# Patient Record
Sex: FEMALE | Race: OTHER | Employment: FULL TIME | URBAN - METROPOLITAN AREA
[De-identification: names, ages, dates, MRNs, and addresses within clinical notes are randomized per-mention and may not be internally consistent; named-entity substitution may affect disease eponyms.]

---

## 2017-01-12 ENCOUNTER — TELEPHONE (OUTPATIENT)
Dept: FAMILY MEDICINE CLINIC | Facility: CLINIC | Age: 27
End: 2017-01-12

## 2017-01-12 DIAGNOSIS — Z11.1 SCREENING-PULMONARY TB: Primary | ICD-10-CM

## 2017-01-16 NOTE — TELEPHONE ENCOUNTER
ASHIA- Please call and schedule an appointment for patient for a nurse visit. Tb placement. See message below.

## 2017-01-17 ENCOUNTER — APPOINTMENT (OUTPATIENT)
Dept: LAB | Age: 27
End: 2017-01-17
Attending: FAMILY MEDICINE
Payer: MEDICAID

## 2017-01-17 DIAGNOSIS — Z11.1 SCREENING-PULMONARY TB: ICD-10-CM

## 2017-01-17 PROCEDURE — 86480 TB TEST CELL IMMUN MEASURE: CPT

## 2017-01-17 PROCEDURE — 36415 COLL VENOUS BLD VENIPUNCTURE: CPT

## 2017-01-20 LAB
M TB IFN-G CD4+ BCKGRND COR BLD-ACNC: 0.01 IU/ML
M TB IFN-G CD4+ T-CELLS BLD-ACNC: 0.08 IU/ML
M TB TUBERC IFN-G BLD QL: NEGATIVE
M TB TUBERC IGNF/MITOGEN IGNF CONTROL: >10 IU/ML

## 2017-01-21 RX ORDER — ETONOGESTREL/ETHINYL ESTRADIOL .12-.015MG
RING, VAGINAL VAGINAL
Qty: 3 EACH | Refills: 0 | Status: SHIPPED | OUTPATIENT
Start: 2017-01-21 | End: 2017-04-17

## 2017-01-21 NOTE — TELEPHONE ENCOUNTER
Refilled per written protocol.     Gynecology Medications  Protocol Criteria:  · Appointment scheduled in the past 12 months or the next 3 months  · Pap smear in the past 12 months  · Pap smear WNL manually verified  Recent Visits       Provider Department

## 2017-04-17 RX ORDER — ETONOGESTREL/ETHINYL ESTRADIOL .12-.015MG
RING, VAGINAL VAGINAL
Qty: 3 EACH | Refills: 0 | Status: SHIPPED | OUTPATIENT
Start: 2017-04-17 | End: 2017-07-17

## 2017-04-17 NOTE — TELEPHONE ENCOUNTER
Gynecology Medications  Protocol Criteria:  · Appointment scheduled in the past 12 months or the next 3 months  · Pap smear in the past 12 months  · Pap smear WNL manually verified  Recent Visits       Provider Department Primary Dx    5 months ago Jesus

## 2017-07-18 RX ORDER — ETONOGESTREL AND ETHINYL ESTRADIOL 11.7; 2.7 MG/1; MG/1
INSERT, EXTENDED RELEASE VAGINAL
Qty: 3 EACH | Refills: 0 | Status: SHIPPED | OUTPATIENT
Start: 2017-07-18 | End: 2017-10-06

## 2017-07-19 RX ORDER — ETONOGESTREL/ETHINYL ESTRADIOL .12-.015MG
RING, VAGINAL VAGINAL
Refills: 0 | OUTPATIENT
Start: 2017-07-19

## 2017-08-17 ENCOUNTER — OFFICE VISIT (OUTPATIENT)
Dept: FAMILY MEDICINE CLINIC | Facility: CLINIC | Age: 27
End: 2017-08-17

## 2017-08-17 ENCOUNTER — LAB ENCOUNTER (OUTPATIENT)
Dept: LAB | Age: 27
End: 2017-08-17
Attending: FAMILY MEDICINE
Payer: COMMERCIAL

## 2017-08-17 VITALS
SYSTOLIC BLOOD PRESSURE: 111 MMHG | BODY MASS INDEX: 28.56 KG/M2 | HEART RATE: 68 BPM | TEMPERATURE: 99 F | DIASTOLIC BLOOD PRESSURE: 69 MMHG | WEIGHT: 161.19 LBS | RESPIRATION RATE: 14 BRPM | HEIGHT: 63 IN

## 2017-08-17 DIAGNOSIS — D18.01 CHERRY ANGIOMA: ICD-10-CM

## 2017-08-17 DIAGNOSIS — M54.6 CHRONIC BILATERAL THORACIC BACK PAIN: ICD-10-CM

## 2017-08-17 DIAGNOSIS — G89.29 CHRONIC BILATERAL THORACIC BACK PAIN: ICD-10-CM

## 2017-08-17 DIAGNOSIS — Z20.01: ICD-10-CM

## 2017-08-17 DIAGNOSIS — Z20.01: Primary | ICD-10-CM

## 2017-08-17 PROCEDURE — 99212 OFFICE O/P EST SF 10 MIN: CPT | Performed by: FAMILY MEDICINE

## 2017-08-17 PROCEDURE — 87045 FECES CULTURE AEROBIC BACT: CPT

## 2017-08-17 PROCEDURE — 87427 SHIGA-LIKE TOXIN AG IA: CPT

## 2017-08-17 PROCEDURE — 99214 OFFICE O/P EST MOD 30 MIN: CPT | Performed by: FAMILY MEDICINE

## 2017-08-17 PROCEDURE — 87046 STOOL CULTR AEROBIC BACT EA: CPT

## 2017-08-17 RX ORDER — CYCLOBENZAPRINE HCL 10 MG
10 TABLET ORAL NIGHTLY
Qty: 30 TABLET | Refills: 0 | Status: SHIPPED | OUTPATIENT
Start: 2017-08-17 | End: 2019-06-27

## 2017-08-17 NOTE — PROGRESS NOTES
Patient ID: Monica Choudhury is a 32year old female. HPI  Patient presents with:  Back Pain  Growth    She states for 2 years now she has had a small red dot on the left side of her nose.   It is just there and is not bothersome but she wanted it evaluate Pregnancy 2008    Induced ; Outcome/detail:  6 week, Unknown sex   • Pregnancy     ; Comments:  APGAR  8 (1 min) 9 (5 min);  \"Donte\"       Past Surgical History:  , :        Current Outpatient Prescriptions:  Etonogestrel-Et Diagnoses and all orders for this visit:    Exposure to E. coli  -     STOOL CULTURE W/SHIGATOXIN; Future  Stool culture ordered  Cherry angioma  Reassurance  Chronic bilateral thoracic back pain  -     Cyclobenzaprine HCl 10 MG Oral Tab;  Take 1 tablet

## 2017-08-21 ENCOUNTER — TELEPHONE (OUTPATIENT)
Dept: FAMILY MEDICINE CLINIC | Facility: CLINIC | Age: 27
End: 2017-08-21

## 2017-09-11 RX ORDER — CLOBETASOL PROPIONATE 0.5 MG/G
CREAM TOPICAL
Qty: 45 G | Refills: 0 | OUTPATIENT
Start: 2017-09-11

## 2017-09-19 ENCOUNTER — HOSPITAL ENCOUNTER (OUTPATIENT)
Dept: GENERAL RADIOLOGY | Age: 27
Discharge: HOME | End: 2017-09-19
Attending: FAMILY MEDICINE
Payer: COMMERCIAL

## 2017-09-19 ENCOUNTER — OFFICE VISIT (OUTPATIENT)
Dept: FAMILY MEDICINE CLINIC | Facility: CLINIC | Age: 27
End: 2017-09-19

## 2017-09-19 VITALS
HEART RATE: 76 BPM | RESPIRATION RATE: 14 BRPM | HEIGHT: 63 IN | TEMPERATURE: 99 F | SYSTOLIC BLOOD PRESSURE: 116 MMHG | WEIGHT: 164 LBS | DIASTOLIC BLOOD PRESSURE: 77 MMHG | BODY MASS INDEX: 29.06 KG/M2

## 2017-09-19 DIAGNOSIS — Z00.00 ADULT GENERAL MEDICAL EXAM: Primary | ICD-10-CM

## 2017-09-19 DIAGNOSIS — G89.29 CHRONIC BILATERAL LOW BACK PAIN WITHOUT SCIATICA: ICD-10-CM

## 2017-09-19 DIAGNOSIS — M54.50 CHRONIC BILATERAL LOW BACK PAIN WITHOUT SCIATICA: ICD-10-CM

## 2017-09-19 DIAGNOSIS — R53.83 LETHARGY: ICD-10-CM

## 2017-09-19 DIAGNOSIS — R68.89 FORGETFULNESS: ICD-10-CM

## 2017-09-19 PROCEDURE — 72110 X-RAY EXAM L-2 SPINE 4/>VWS: CPT | Performed by: FAMILY MEDICINE

## 2017-09-19 PROCEDURE — 99395 PREV VISIT EST AGE 18-39: CPT | Performed by: FAMILY MEDICINE

## 2017-09-19 RX ORDER — NABUMETONE 750 MG/1
750 TABLET, FILM COATED ORAL 2 TIMES DAILY
Qty: 60 TABLET | Refills: 1 | Status: SHIPPED | OUTPATIENT
Start: 2017-09-19 | End: 2018-04-13

## 2017-09-19 NOTE — PROGRESS NOTES
Patient ID: Virginia Cox is a 32year old female. HPI  Patient presents with:  Routine Physical  She is here for physical.    She is , she does not smoke, she is in school. She states her back continues to hurt.   It is more in the left side Neurological: Negative for dizziness, tremors, seizures, syncope, facial asymmetry, speech difficulty, weakness, light-headedness and headaches. Hematological: Negative for adenopathy. Does not bruise/bleed easily.    Psychiatric/Behavioral: Negative fo reactive to light. Neck: Normal range of motion. Neck supple. No thyromegaly present. Cardiovascular: Normal rate, regular rhythm and no murmur heard. Pulmonary/Chest: Effort normal and breath sounds normal. No respiratory distress. Abdominal: Soft. times daily. Take with meals. (for pain/inflammation). Lethargy  -     VITAMIN D, 25-HYDROXY; Future  -     VITAMIN B12; Future  -     SED RATE, WESTERGREN (AUTOMATED);  Future  -     C-REACTIVE PROTEIN; Future  -     RUT, DIRECT SCREEN; Future  -     RH

## 2017-09-20 ENCOUNTER — LAB ENCOUNTER (OUTPATIENT)
Dept: LAB | Age: 27
End: 2017-09-20
Attending: FAMILY MEDICINE
Payer: COMMERCIAL

## 2017-09-20 DIAGNOSIS — G89.29 CHRONIC BILATERAL LOW BACK PAIN WITHOUT SCIATICA: ICD-10-CM

## 2017-09-20 DIAGNOSIS — Z00.00 ADULT GENERAL MEDICAL EXAM: ICD-10-CM

## 2017-09-20 DIAGNOSIS — M54.50 CHRONIC BILATERAL LOW BACK PAIN WITHOUT SCIATICA: ICD-10-CM

## 2017-09-20 DIAGNOSIS — R53.83 LETHARGY: ICD-10-CM

## 2017-09-20 LAB
ALBUMIN SERPL BCP-MCNC: 3.9 G/DL (ref 3.5–4.8)
ALBUMIN/GLOB SERPL: 1.1 {RATIO} (ref 1–2)
ALP SERPL-CCNC: 63 U/L (ref 32–100)
ALT SERPL-CCNC: 15 U/L (ref 14–54)
ANION GAP SERPL CALC-SCNC: 8 MMOL/L (ref 0–18)
AST SERPL-CCNC: 19 U/L (ref 15–41)
BASOPHILS # BLD: 0 K/UL (ref 0–0.2)
BASOPHILS NFR BLD: 0 %
BILIRUB SERPL-MCNC: 0.6 MG/DL (ref 0.3–1.2)
BUN SERPL-MCNC: 8 MG/DL (ref 8–20)
BUN/CREAT SERPL: 11.3 (ref 10–20)
CALCIUM SERPL-MCNC: 9.3 MG/DL (ref 8.5–10.5)
CHLORIDE SERPL-SCNC: 106 MMOL/L (ref 95–110)
CHOLEST SERPL-MCNC: 164 MG/DL (ref 110–200)
CO2 SERPL-SCNC: 24 MMOL/L (ref 22–32)
CREAT SERPL-MCNC: 0.71 MG/DL (ref 0.5–1.5)
CRP SERPL-MCNC: 0.6 MG/DL (ref 0–0.9)
EOSINOPHIL # BLD: 0.2 K/UL (ref 0–0.7)
EOSINOPHIL NFR BLD: 3 %
ERYTHROCYTE [DISTWIDTH] IN BLOOD BY AUTOMATED COUNT: 13 % (ref 11–15)
ERYTHROCYTE [SEDIMENTATION RATE] IN BLOOD: 6 MM/HR (ref 0–20)
GLOBULIN PLAS-MCNC: 3.7 G/DL (ref 2.5–3.7)
GLUCOSE SERPL-MCNC: 84 MG/DL (ref 70–99)
HCT VFR BLD AUTO: 41.9 % (ref 35–48)
HDLC SERPL-MCNC: 47 MG/DL
HGB BLD-MCNC: 14.2 G/DL (ref 12–16)
LDLC SERPL CALC-MCNC: 91 MG/DL (ref 0–99)
LYMPHOCYTES # BLD: 2.3 K/UL (ref 1–4)
LYMPHOCYTES NFR BLD: 33 %
MCH RBC QN AUTO: 28.5 PG (ref 27–32)
MCHC RBC AUTO-ENTMCNC: 33.8 G/DL (ref 32–37)
MCV RBC AUTO: 84.1 FL (ref 80–100)
MONOCYTES # BLD: 0.5 K/UL (ref 0–1)
MONOCYTES NFR BLD: 7 %
NEUTROPHILS # BLD AUTO: 3.9 K/UL (ref 1.8–7.7)
NEUTROPHILS NFR BLD: 57 %
NONHDLC SERPL-MCNC: 117 MG/DL
OSMOLALITY UR CALC.SUM OF ELEC: 284 MOSM/KG (ref 275–295)
PLATELET # BLD AUTO: 195 K/UL (ref 140–400)
PMV BLD AUTO: 9.8 FL (ref 7.4–10.3)
POTASSIUM SERPL-SCNC: 4 MMOL/L (ref 3.3–5.1)
PROT SERPL-MCNC: 7.6 G/DL (ref 5.9–8.4)
RBC # BLD AUTO: 4.98 M/UL (ref 3.7–5.4)
RHEUMATOID FACT SER QL: <5 IU/ML
SODIUM SERPL-SCNC: 138 MMOL/L (ref 136–144)
TRIGL SERPL-MCNC: 129 MG/DL (ref 1–149)
TSH SERPL-ACNC: 3.36 UIU/ML (ref 0.45–5.33)
VIT B12 SERPL-MCNC: 378 PG/ML (ref 181–914)
WBC # BLD AUTO: 6.9 K/UL (ref 4–11)

## 2017-09-20 PROCEDURE — 80053 COMPREHEN METABOLIC PANEL: CPT

## 2017-09-20 PROCEDURE — 80061 LIPID PANEL: CPT

## 2017-09-20 PROCEDURE — 82306 VITAMIN D 25 HYDROXY: CPT

## 2017-09-20 PROCEDURE — 36415 COLL VENOUS BLD VENIPUNCTURE: CPT

## 2017-09-20 PROCEDURE — 84443 ASSAY THYROID STIM HORMONE: CPT

## 2017-09-20 PROCEDURE — 86038 ANTINUCLEAR ANTIBODIES: CPT

## 2017-09-20 PROCEDURE — 82607 VITAMIN B-12: CPT

## 2017-09-20 PROCEDURE — 85652 RBC SED RATE AUTOMATED: CPT

## 2017-09-20 PROCEDURE — 85025 COMPLETE CBC W/AUTO DIFF WBC: CPT

## 2017-09-20 PROCEDURE — 86431 RHEUMATOID FACTOR QUANT: CPT

## 2017-09-20 PROCEDURE — 86140 C-REACTIVE PROTEIN: CPT

## 2017-09-21 LAB — ANA SER QL: NEGATIVE

## 2017-09-22 LAB — 25(OH)D3 SERPL-MCNC: 36.4 NG/ML

## 2017-10-07 RX ORDER — ETONOGESTREL/ETHINYL ESTRADIOL .12-.015MG
RING, VAGINAL VAGINAL
Qty: 1 EACH | Refills: 0 | Status: SHIPPED | OUTPATIENT
Start: 2017-10-07 | End: 2017-11-07

## 2017-10-07 NOTE — TELEPHONE ENCOUNTER
Signed Prescriptions Disp Refills    NUVARING 0.12-0.015 MG/24HR Vaginal Ring 1 each 0      Sig: INSERT 1 VAGINAL RING VAGINALLY AS DIRECTED        Authorizing Provider: Bethany Krause        Ordering User: Nadia Andersen           Refill approved per p

## 2017-11-07 RX ORDER — ETONOGESTREL/ETHINYL ESTRADIOL .12-.015MG
RING, VAGINAL VAGINAL
Qty: 3 EACH | Refills: 4 | Status: SHIPPED | OUTPATIENT
Start: 2017-11-07 | End: 2018-11-29

## 2017-11-07 NOTE — TELEPHONE ENCOUNTER
Unable to refill per protocol pap over 1 year ago.    Gynecology Medications  Protocol Criteria:  · Appointment scheduled in the past 12 months or the next 3 months  · Pap smear in the past 12 months  · Pap smear WNL manually verified  Recent Outpatient Vis

## 2018-03-29 ENCOUNTER — OFFICE VISIT (OUTPATIENT)
Dept: OBGYN CLINIC | Facility: CLINIC | Age: 28
End: 2018-03-29

## 2018-03-29 VITALS
DIASTOLIC BLOOD PRESSURE: 66 MMHG | BODY MASS INDEX: 30 KG/M2 | WEIGHT: 170 LBS | SYSTOLIC BLOOD PRESSURE: 105 MMHG | HEART RATE: 78 BPM

## 2018-03-29 DIAGNOSIS — N89.8 VAGINAL DISCHARGE: Primary | ICD-10-CM

## 2018-03-29 PROCEDURE — 99213 OFFICE O/P EST LOW 20 MIN: CPT | Performed by: OBSTETRICS & GYNECOLOGY

## 2018-03-29 NOTE — H&P
HPI:  The patient is a 27-year-old female who presents complaining of black discharge with her menses. The patient states she gets monthly menses. She uses NuvaRing for contraception.   She states that she bleeds for 5 days and the bleeding will either 0. 12-0.015 MG/24HR Vaginal Ring, INSERT 1 VAGINAL RING AS DIRECTED, Disp: 3 each, Rfl: 4  •  Nabumetone 750 MG Oral Tab, Take 1 tablet (750 mg total) by mouth 2 (two) times daily. Take with meals. (for pain/inflammation). , Disp: 60 tablet, Rfl: 1    ALLERG

## 2018-03-31 LAB
GENITAL VAGINOSIS SCREEN: NEGATIVE
TRICHOMONAS SCREEN: NEGATIVE

## 2018-04-04 ENCOUNTER — TELEPHONE (OUTPATIENT)
Dept: OBGYN CLINIC | Facility: CLINIC | Age: 28
End: 2018-04-04

## 2018-04-13 ENCOUNTER — OFFICE VISIT (OUTPATIENT)
Dept: OBGYN CLINIC | Facility: CLINIC | Age: 28
End: 2018-04-13

## 2018-04-13 VITALS
WEIGHT: 168 LBS | BODY MASS INDEX: 30.14 KG/M2 | HEIGHT: 62.5 IN | SYSTOLIC BLOOD PRESSURE: 103 MMHG | HEART RATE: 76 BPM | DIASTOLIC BLOOD PRESSURE: 66 MMHG

## 2018-04-13 DIAGNOSIS — Z01.419 ENCOUNTER FOR GYNECOLOGICAL EXAMINATION WITHOUT ABNORMAL FINDING: Primary | ICD-10-CM

## 2018-04-13 PROCEDURE — 99395 PREV VISIT EST AGE 18-39: CPT | Performed by: OBSTETRICS & GYNECOLOGY

## 2018-04-13 NOTE — H&P
HPI:  The patient is a 33 yo F here for WWE. NO complaints. LMP 3/24/18 with regular monthly  Cycles. Using nuvaring. +IC with .       LPS: 1/21/16-neg    Reviewed medical and surgical history below       OBSTETRICS HISTORY:  Obstetric History diarrhea or constipation  Genitourinary:  denies dysuria, incontinence, abnormal vaginal discharge, vaginal itching  Musculoskeletal:  denies back pain. Skin/Breast:  Denies any breast pain, lumps, or discharge.    Neurological:  denies headaches, extremit

## 2018-11-08 ENCOUNTER — OFFICE VISIT (OUTPATIENT)
Dept: FAMILY MEDICINE CLINIC | Facility: CLINIC | Age: 28
End: 2018-11-08
Payer: MEDICAID

## 2018-11-08 VITALS
HEIGHT: 63 IN | DIASTOLIC BLOOD PRESSURE: 71 MMHG | BODY MASS INDEX: 26.26 KG/M2 | TEMPERATURE: 99 F | HEART RATE: 65 BPM | SYSTOLIC BLOOD PRESSURE: 108 MMHG | RESPIRATION RATE: 12 BRPM | WEIGHT: 148.19 LBS

## 2018-11-08 DIAGNOSIS — Z00.00 ADULT GENERAL MEDICAL EXAM: Primary | ICD-10-CM

## 2018-11-08 DIAGNOSIS — L98.7 EXCESS SKIN OF ABDOMINAL WALL: ICD-10-CM

## 2018-11-08 DIAGNOSIS — Z01.818 PREOP EXAMINATION: ICD-10-CM

## 2018-11-08 DIAGNOSIS — N89.8 ITCHING IN THE VAGINAL AREA: ICD-10-CM

## 2018-11-08 DIAGNOSIS — Z23 NEED FOR VACCINATION: ICD-10-CM

## 2018-11-08 PROCEDURE — 99212 OFFICE O/P EST SF 10 MIN: CPT | Performed by: FAMILY MEDICINE

## 2018-11-08 PROCEDURE — 90715 TDAP VACCINE 7 YRS/> IM: CPT | Performed by: FAMILY MEDICINE

## 2018-11-08 PROCEDURE — 99395 PREV VISIT EST AGE 18-39: CPT | Performed by: FAMILY MEDICINE

## 2018-11-08 PROCEDURE — 90471 IMMUNIZATION ADMIN: CPT | Performed by: FAMILY MEDICINE

## 2018-11-08 NOTE — PATIENT INSTRUCTIONS
Try some 1% over-the-counter hydrocortisone cream and just apply a small amount to the area that itches outside of the vaginal area but make sure to see Dr. Paulie Perry or 1 of his partners for gynecology.

## 2018-11-09 ENCOUNTER — APPOINTMENT (OUTPATIENT)
Dept: LAB | Age: 28
End: 2018-11-09
Attending: FAMILY MEDICINE
Payer: MEDICAID

## 2018-11-09 ENCOUNTER — LAB ENCOUNTER (OUTPATIENT)
Dept: LAB | Age: 28
End: 2018-11-09
Attending: FAMILY MEDICINE
Payer: MEDICAID

## 2018-11-09 DIAGNOSIS — Z01.818 PREOP EXAMINATION: ICD-10-CM

## 2018-11-09 DIAGNOSIS — L98.7 EXCESS SKIN OF ABDOMINAL WALL: ICD-10-CM

## 2018-11-09 DIAGNOSIS — Z00.00 ADULT GENERAL MEDICAL EXAM: ICD-10-CM

## 2018-11-09 PROCEDURE — 80053 COMPREHEN METABOLIC PANEL: CPT

## 2018-11-09 PROCEDURE — 93005 ELECTROCARDIOGRAM TRACING: CPT

## 2018-11-09 PROCEDURE — 81241 F5 GENE: CPT

## 2018-11-09 PROCEDURE — 36415 COLL VENOUS BLD VENIPUNCTURE: CPT

## 2018-11-09 PROCEDURE — 85730 THROMBOPLASTIN TIME PARTIAL: CPT

## 2018-11-09 PROCEDURE — 84443 ASSAY THYROID STIM HORMONE: CPT

## 2018-11-09 PROCEDURE — 85025 COMPLETE CBC W/AUTO DIFF WBC: CPT

## 2018-11-09 PROCEDURE — 85610 PROTHROMBIN TIME: CPT

## 2018-11-09 PROCEDURE — 93010 ELECTROCARDIOGRAM REPORT: CPT | Performed by: FAMILY MEDICINE

## 2018-11-09 PROCEDURE — 80061 LIPID PANEL: CPT

## 2018-11-13 ENCOUNTER — TELEPHONE (OUTPATIENT)
Dept: FAMILY MEDICINE CLINIC | Facility: CLINIC | Age: 28
End: 2018-11-13

## 2018-11-13 NOTE — TELEPHONE ENCOUNTER
I have made multiple attempts to contact her surgeons office to ask for the fax# where her preop paper should go, with no luck.  Left message for patient to contact her surgeon to get a fax# for us and call us back with that information

## 2018-11-21 ENCOUNTER — OFFICE VISIT (OUTPATIENT)
Dept: OBGYN CLINIC | Facility: CLINIC | Age: 28
End: 2018-11-21
Payer: MEDICAID

## 2018-11-21 VITALS
WEIGHT: 148 LBS | BODY MASS INDEX: 26 KG/M2 | SYSTOLIC BLOOD PRESSURE: 112 MMHG | HEART RATE: 67 BPM | DIASTOLIC BLOOD PRESSURE: 71 MMHG

## 2018-11-21 DIAGNOSIS — N89.8 VAGINAL ITCHING: Primary | ICD-10-CM

## 2018-11-21 PROCEDURE — 99213 OFFICE O/P EST LOW 20 MIN: CPT | Performed by: OBSTETRICS & GYNECOLOGY

## 2018-11-21 RX ORDER — LIDOCAINE HYDROCHLORIDE 20 MG/ML
SOLUTION ORAL; TOPICAL
Refills: 0 | COMMUNITY
Start: 2018-11-14 | End: 2019-06-14

## 2018-11-21 NOTE — H&P
HPI:  28 yo F c/o 2 weeks of vaginal itch, dryness, and cheezy DC. No abx recently. No douching or changes soaps/detergents. +IC with  of 10 years.   Has nuvaring    Reviewed medical and surgical history below       OBSTETRICS HISTORY:  Obstetric Back Care: Not Asked        Exercise: Not Asked        Bike Helmet: Not Asked        Seat Belt: Not Asked        Self-Exams: Not Asked    Social History Narrative      Not on file      FAMILY HISTORY:  Family History   Problem Relation Age of Onset   • Will call with results

## 2018-11-23 ENCOUNTER — TELEPHONE (OUTPATIENT)
Dept: OBGYN CLINIC | Facility: CLINIC | Age: 28
End: 2018-11-23

## 2018-11-23 RX ORDER — FLUCONAZOLE 150 MG/1
150 TABLET ORAL ONCE
Qty: 2 TABLET | Refills: 0 | Status: SHIPPED | OUTPATIENT
Start: 2018-11-23 | End: 2018-11-23

## 2018-11-23 NOTE — TELEPHONE ENCOUNTER
Pt informed of MAZs recs below and verbalized understanding.  Diflucan x 1 tab now and repeat dose 48-72 hours later sent to pharmacy

## 2018-11-29 RX ORDER — ETONOGESTREL/ETHINYL ESTRADIOL .12-.015MG
RING, VAGINAL VAGINAL
Qty: 3 EACH | Refills: 1 | Status: SHIPPED | OUTPATIENT
Start: 2018-11-29 | End: 2019-05-20

## 2018-12-11 ENCOUNTER — HOSPITAL ENCOUNTER (OUTPATIENT)
Dept: GENERAL RADIOLOGY | Age: 28
Discharge: HOME | End: 2018-12-11
Attending: FAMILY MEDICINE
Payer: MEDICAID

## 2018-12-11 DIAGNOSIS — Z11.1 SCREENING-PULMONARY TB: ICD-10-CM

## 2018-12-11 PROCEDURE — 71046 X-RAY EXAM CHEST 2 VIEWS: CPT | Performed by: FAMILY MEDICINE

## 2018-12-14 ENCOUNTER — TELEPHONE (OUTPATIENT)
Dept: FAMILY MEDICINE CLINIC | Facility: CLINIC | Age: 28
End: 2018-12-14

## 2019-05-20 RX ORDER — ETONOGESTREL/ETHINYL ESTRADIOL .12-.015MG
RING, VAGINAL VAGINAL
Qty: 3 EACH | Refills: 1 | Status: SHIPPED | OUTPATIENT
Start: 2019-05-20 | End: 2019-11-16

## 2019-05-20 NOTE — TELEPHONE ENCOUNTER
Patient failed protocol. Script pended. Please advise.     Overdue - Pap Smear,3 Years   Overdue since 1/21/2019    (Every 3 Years)   01/21/2016  THINPREP PAP SMEAR         Gynecology Medications  Protocol Criteria:  · Appointment scheduled in the past 12 m

## 2019-06-14 PROBLEM — F41.9 ANXIETY: Status: ACTIVE | Noted: 2019-06-14

## 2019-06-14 PROBLEM — F32.A DEPRESSION: Status: ACTIVE | Noted: 2019-06-14

## 2019-06-14 NOTE — PROGRESS NOTES
Patient ID: Virginia Cox is a 29year old female. HPI  Patient presents with: Anxiety  Stress     Works at a school as a , is , and does not smoke. Anxiety started a year ago. Pt also reports she occasionally has depression. History:   Diagnosis Date   • Pregnancy     24 hr labor ; Outcome/detail:  40 1/2 week 7 lb(s) 6 oz Female; Comments:  \"Jovita\"; [facility]); No complications   • Pregnancy     Induced ;  Outcome/detail:  6 week, Unknow be beneficial as patient states she just told everything inside although she does talk to her  about how worried she is. Depression, unspecified depression type  -     escitalopram 10 MG Oral Tab; Take 1 tablet (10 mg total) by mouth daily.  For fozia

## 2019-06-27 ENCOUNTER — OFFICE VISIT (OUTPATIENT)
Dept: FAMILY MEDICINE CLINIC | Facility: CLINIC | Age: 29
End: 2019-06-27
Payer: MEDICAID

## 2019-06-27 VITALS
RESPIRATION RATE: 12 BRPM | DIASTOLIC BLOOD PRESSURE: 72 MMHG | WEIGHT: 145.38 LBS | HEIGHT: 63 IN | SYSTOLIC BLOOD PRESSURE: 106 MMHG | BODY MASS INDEX: 25.76 KG/M2 | TEMPERATURE: 99 F | HEART RATE: 67 BPM

## 2019-06-27 DIAGNOSIS — L56.8 PHOTODERMATITIS DUE TO SUN: ICD-10-CM

## 2019-06-27 DIAGNOSIS — M53.3 SI (SACROILIAC) PAIN: ICD-10-CM

## 2019-06-27 DIAGNOSIS — M54.50 ACUTE LEFT-SIDED LOW BACK PAIN WITHOUT SCIATICA: Primary | ICD-10-CM

## 2019-06-27 PROCEDURE — 99214 OFFICE O/P EST MOD 30 MIN: CPT | Performed by: FAMILY MEDICINE

## 2019-06-27 PROCEDURE — 99212 OFFICE O/P EST SF 10 MIN: CPT | Performed by: FAMILY MEDICINE

## 2019-06-27 RX ORDER — CLOBETASOL PROPIONATE 0.5 MG/G
1 CREAM TOPICAL 2 TIMES DAILY
Qty: 45 G | Refills: 0 | Status: SHIPPED | OUTPATIENT
Start: 2019-06-27 | End: 2020-02-13

## 2019-06-27 RX ORDER — CYCLOBENZAPRINE HCL 10 MG
10 TABLET ORAL NIGHTLY
Qty: 30 TABLET | Refills: 0 | Status: SHIPPED | OUTPATIENT
Start: 2019-06-27 | End: 2019-07-17

## 2019-06-27 RX ORDER — NABUMETONE 750 MG/1
750 TABLET, FILM COATED ORAL 2 TIMES DAILY
Qty: 60 TABLET | Refills: 1 | Status: SHIPPED | OUTPATIENT
Start: 2019-06-27 | End: 2019-08-22

## 2019-06-27 NOTE — PATIENT INSTRUCTIONS
Take the Relafen 750 mg twice daily with food for the next 2 to 3 weeks. You could take the cyclobenzaprine at nighttime only as that is a muscle relaxant and can make you a bit tired.   For the next 3 days go ahead and ice your low back 3 or 4 times daily

## 2019-06-27 NOTE — PROGRESS NOTES
Patient ID: Leticia Juárez is a 29year old female. HPI  Patient presents with:  Back Pain: lower back x 1wk     Back pain started a week and a half ago. No injury or trauma. Localized to left lower back. Reports that the pain is deep.  Also has soreness Cardiovascular: Negative for chest pain. Gastrointestinal: Negative for abdominal pain. Musculoskeletal: Positive for back pain. Skin: Negative for color change. Neurological: Negative for speech difficulty.    Psychiatric/Behavioral: The patient mood and affect. Vitals reviewed. ASSESSMENT/PLAN:     Diagnoses and all orders for this visit:    Acute left-sided low back pain without sciatica  -     Nabumetone 750 MG Oral Tab; Take 1 tablet (750 mg total) by mouth 2 (two) times daily.  Arcelia Garcias or 4 times daily on the area of pain. Go ahead and do your stretches as well. If you are still having pain after 2 or 3 weeks then please see me but otherwise no need.         Robin Arias  6/27/2019      By signing my name below, I, louis Bellamy

## 2019-08-22 DIAGNOSIS — M53.3 SI (SACROILIAC) PAIN: ICD-10-CM

## 2019-08-22 DIAGNOSIS — M54.50 ACUTE LEFT-SIDED LOW BACK PAIN WITHOUT SCIATICA: ICD-10-CM

## 2019-08-22 RX ORDER — NABUMETONE 750 MG/1
TABLET, FILM COATED ORAL
Qty: 180 TABLET | Refills: 1 | Status: SHIPPED | OUTPATIENT
Start: 2019-08-22 | End: 2020-02-13

## 2019-08-22 NOTE — TELEPHONE ENCOUNTER
Dr Lee=pended for approval, with HIGH ALERT WARNING between Escitalopram and Nabumetone. Refill passed per [facility] protocol.     Refill Protocol Appointment Criteria  · Appointment scheduled in the past 6 months or in the next 3 months  Recent O

## 2019-09-09 DIAGNOSIS — F32.A DEPRESSION, UNSPECIFIED DEPRESSION TYPE: ICD-10-CM

## 2019-09-09 DIAGNOSIS — F41.9 ANXIETY: ICD-10-CM

## 2019-09-09 RX ORDER — ESCITALOPRAM OXALATE 10 MG/1
TABLET ORAL
Qty: 90 TABLET | Refills: 0 | Status: SHIPPED | OUTPATIENT
Start: 2019-09-09 | End: 2020-02-13

## 2019-09-09 NOTE — TELEPHONE ENCOUNTER
Refilled times 1 but needs appointment before the next prescription will be filled. Please call patient and set up an office visit as overdue. I will see her in November for a physical exam.  I gave her 3 months worth of medication.

## 2019-09-11 ENCOUNTER — TELEPHONE (OUTPATIENT)
Dept: OBGYN CLINIC | Facility: CLINIC | Age: 29
End: 2019-09-11

## 2019-09-11 NOTE — TELEPHONE ENCOUNTER
Confirms +HPT and LMP 8/12/19. 4w2d confirms regular cycles every 28 days. Advised of rotating male and female practice. Advised first appt is OBN and to come in a few minutes early for uhcg. States taking PNV with DHA and folic acid.  Informed no appts bruce

## 2019-09-25 NOTE — TELEPHONE ENCOUNTER
Patient was inform medication was approved and  will like to see her in November for her physical. Patient said she will call back tomorrow to make appointment.

## 2019-10-07 ENCOUNTER — LAB ENCOUNTER (OUTPATIENT)
Dept: LAB | Facility: HOSPITAL | Age: 29
End: 2019-10-07
Attending: OBSTETRICS & GYNECOLOGY
Payer: MEDICAID

## 2019-10-07 ENCOUNTER — NURSE ONLY (OUTPATIENT)
Dept: OBGYN CLINIC | Facility: CLINIC | Age: 29
End: 2019-10-07

## 2019-10-07 DIAGNOSIS — Z34.91 ENCOUNTER FOR SUPERVISION OF NORMAL PREGNANCY IN FIRST TRIMESTER, UNSPECIFIED GRAVIDITY: ICD-10-CM

## 2019-10-07 PROCEDURE — 86900 BLOOD TYPING SEROLOGIC ABO: CPT

## 2019-10-07 PROCEDURE — 86803 HEPATITIS C AB TEST: CPT

## 2019-10-07 PROCEDURE — 87340 HEPATITIS B SURFACE AG IA: CPT

## 2019-10-07 PROCEDURE — 85025 COMPLETE CBC W/AUTO DIFF WBC: CPT

## 2019-10-07 PROCEDURE — 86850 RBC ANTIBODY SCREEN: CPT

## 2019-10-07 PROCEDURE — 87086 URINE CULTURE/COLONY COUNT: CPT

## 2019-10-07 PROCEDURE — 87389 HIV-1 AG W/HIV-1&-2 AB AG IA: CPT

## 2019-10-07 PROCEDURE — 86780 TREPONEMA PALLIDUM: CPT

## 2019-10-07 PROCEDURE — 86762 RUBELLA ANTIBODY: CPT

## 2019-10-07 PROCEDURE — 36415 COLL VENOUS BLD VENIPUNCTURE: CPT

## 2019-10-07 PROCEDURE — 86901 BLOOD TYPING SEROLOGIC RH(D): CPT

## 2019-10-16 ENCOUNTER — INITIAL PRENATAL (OUTPATIENT)
Dept: OBGYN CLINIC | Facility: CLINIC | Age: 29
End: 2019-10-16
Payer: MEDICAID

## 2019-10-16 ENCOUNTER — TELEPHONE (OUTPATIENT)
Dept: OBGYN CLINIC | Facility: CLINIC | Age: 29
End: 2019-10-16

## 2019-10-16 VITALS
SYSTOLIC BLOOD PRESSURE: 103 MMHG | WEIGHT: 157 LBS | HEART RATE: 82 BPM | BODY MASS INDEX: 28 KG/M2 | DIASTOLIC BLOOD PRESSURE: 64 MMHG

## 2019-10-16 DIAGNOSIS — Z34.81 ENCOUNTER FOR SUPERVISION OF OTHER NORMAL PREGNANCY IN FIRST TRIMESTER: Primary | ICD-10-CM

## 2019-10-16 DIAGNOSIS — Z12.4 CERVICAL CANCER SCREENING: ICD-10-CM

## 2019-10-16 DIAGNOSIS — Z78.9 RECENT FOREIGN TRAVEL: Primary | ICD-10-CM

## 2019-10-16 DIAGNOSIS — Z11.3 SCREEN FOR STD (SEXUALLY TRANSMITTED DISEASE): ICD-10-CM

## 2019-10-16 PROBLEM — O26.899 RH NEGATIVE STATUS DURING PREGNANCY: Status: ACTIVE | Noted: 2019-10-16

## 2019-10-16 PROBLEM — Z20.821 ZIKA VIRUS EXPOSURE: Status: ACTIVE | Noted: 2019-10-16

## 2019-10-16 PROBLEM — O26.899 RH NEGATIVE STATUS DURING PREGNANCY (HCC): Status: ACTIVE | Noted: 2019-10-16

## 2019-10-16 PROBLEM — Z67.91 RH NEGATIVE STATUS DURING PREGNANCY: Status: ACTIVE | Noted: 2019-10-16

## 2019-10-16 PROBLEM — Z67.91 RH NEGATIVE STATUS DURING PREGNANCY (HCC): Status: ACTIVE | Noted: 2019-10-16

## 2019-10-16 PROCEDURE — 81002 URINALYSIS NONAUTO W/O SCOPE: CPT | Performed by: OBSTETRICS & GYNECOLOGY

## 2019-10-16 PROCEDURE — 0500F INITIAL PRENATAL CARE VISIT: CPT | Performed by: OBSTETRICS & GYNECOLOGY

## 2019-10-16 NOTE — PROGRESS NOTES
Pap, GC/CT/Trich today. Pt with light brown discharge over 1.5 weeks ago. No cramping or bleeding. Declines FTS. Pt traveled (without FOB) to [address] [place] [address] in July. Reviewed level 2 US for Zika. BSUS with S=D and +FHTs.  Regular menses and will keep due date  RTC

## 2019-10-17 ENCOUNTER — PATIENT MESSAGE (OUTPATIENT)
Dept: OBGYN CLINIC | Facility: CLINIC | Age: 29
End: 2019-10-17

## 2019-10-17 NOTE — TELEPHONE ENCOUNTER
ADVISED LEVEL II ULTRASOUND IS RECOMMENDED DUE TO TRAVEL TO [place] AND POSSIBLE ZIKA EXPOSURE. PT REASSURED, SHE HAD FORGOTTEN THAT WAS TALKED ABOUT.

## 2019-10-18 ENCOUNTER — TELEPHONE (OUTPATIENT)
Dept: OBGYN CLINIC | Facility: CLINIC | Age: 29
End: 2019-10-18

## 2019-10-18 NOTE — TELEPHONE ENCOUNTER
Pt is 9 weeks pregnant and states she has been cramping all day  Also states she saw a bit of blood when wiping pls adv

## 2019-10-18 NOTE — TELEPHONE ENCOUNTER
Informed pt that GISELLA stated she needs Rhogam w/o add ab screen. Informed pt no IC x 1 week. Informed we will see her in office on Monday if spotting continues. Pt made an appt for tomorrow for Rhogam on nurse schedule.     Informed pt if her bleeding incr

## 2019-10-18 NOTE — TELEPHONE ENCOUNTER
9w4d.  Pt states that she has cramping she rates a 6 out of 10 that comes and goes. Pt had IC yesterday. Pt states she started with red spotting today when she wipes. Pt is O negative.

## 2019-10-18 NOTE — TELEPHONE ENCOUNTER
Needs Rhogam w/o additional Ab screen. No IC x 1 week. See her in office Monday if spotting continues.

## 2019-10-19 ENCOUNTER — NURSE ONLY (OUTPATIENT)
Dept: OBGYN CLINIC | Facility: CLINIC | Age: 29
End: 2019-10-19
Payer: MEDICAID

## 2019-10-19 VITALS
DIASTOLIC BLOOD PRESSURE: 71 MMHG | BODY MASS INDEX: 28 KG/M2 | SYSTOLIC BLOOD PRESSURE: 105 MMHG | HEART RATE: 80 BPM | WEIGHT: 157 LBS

## 2019-10-19 DIAGNOSIS — Z29.13 ENCOUNTER FOR PROPHYLACTIC ADMINISTRATION OF RHOGAM: Primary | ICD-10-CM

## 2019-10-19 PROCEDURE — 96372 THER/PROPH/DIAG INJ SC/IM: CPT | Performed by: OBSTETRICS & GYNECOLOGY

## 2019-10-19 NOTE — PROGRESS NOTES
PT CALLED YESTERDAY WITH SPOTTING THAT IS RESOLVED TODAY. RHOGAM NON-ROUTINE [address] CONSENT SIGNED. RHOGAM INJECTION GIVEN. PT TOLERATED INJECTION WITHOUT INCIDENT. ENCOURAGED TO CALL BACK WITH ANY QUESTIONS OR CONCERNS.

## 2019-10-19 NOTE — PATIENT INSTRUCTIONS
If You Are Rh Negative – Non Routine Administration  If you’re Rh negative, ask your health care provider about getting treated with RhoGam or Rhophylac. Even if you miscarry or don’t deliver the baby, you will still need treatment.  The health of any bab o Blood test to check for blood type and Rh factor at an [address] [facility] ([facility] [place] or [facility])  o RhoGam or Rhophylac injection same day as directed by your provider    Location, date and time:  [address]  10-19

## 2019-10-29 ENCOUNTER — TELEPHONE (OUTPATIENT)
Dept: OBGYN CLINIC | Facility: CLINIC | Age: 29
End: 2019-10-29

## 2019-10-29 NOTE — TELEPHONE ENCOUNTER
Informed pt of results and KCB rec below. Pt requesting appt at [address] [place] [address] location. Assisted pt with scheduling appt with KCB at [address] [place] [address] on 11/6/19 at 8:40am. Pt aware if Polo Stewart wants appt moved we will inform pt. Pt verbalized understanding.      KCB-okay to us

## 2019-10-29 NOTE — TELEPHONE ENCOUNTER
----- Message from Danae Peña MD sent at 10/29/2019  3:50 PM CDT -----  Patient needs colpo for LSIL pap smear. This needs to be done in pregnancy but likely will not take biopsies. Please have her schedule.

## 2019-11-06 ENCOUNTER — OFFICE VISIT (OUTPATIENT)
Dept: OBGYN CLINIC | Facility: CLINIC | Age: 29
End: 2019-11-06
Payer: MEDICAID

## 2019-11-06 VITALS — DIASTOLIC BLOOD PRESSURE: 66 MMHG | SYSTOLIC BLOOD PRESSURE: 111 MMHG | HEART RATE: 76 BPM

## 2019-11-06 DIAGNOSIS — R87.612 PAPANICOLAOU SMEAR OF CERVIX WITH LOW GRADE SQUAMOUS INTRAEPITHELIAL LESION (LGSIL): Primary | ICD-10-CM

## 2019-11-06 PROCEDURE — 57452 EXAM OF CERVIX W/SCOPE: CPT | Performed by: OBSTETRICS & GYNECOLOGY

## 2019-11-06 NOTE — PROCEDURES
Colposcopy      Consent signed. Procedure discussed with patient in detail including indication, risk, benefits, alternatives and complications. Indication: LSIL    Procedure: The patient was placed in the dorsal lithotomy position.   [place] speculum

## 2019-11-16 ENCOUNTER — ROUTINE PRENATAL (OUTPATIENT)
Dept: OBGYN CLINIC | Facility: CLINIC | Age: 29
End: 2019-11-16
Payer: MEDICAID

## 2019-11-16 VITALS
HEART RATE: 78 BPM | WEIGHT: 159 LBS | DIASTOLIC BLOOD PRESSURE: 69 MMHG | BODY MASS INDEX: 29 KG/M2 | SYSTOLIC BLOOD PRESSURE: 111 MMHG

## 2019-11-16 DIAGNOSIS — Z34.91 ENCOUNTER FOR SUPERVISION OF NORMAL PREGNANCY IN FIRST TRIMESTER, UNSPECIFIED GRAVIDITY: Primary | ICD-10-CM

## 2019-11-16 PROCEDURE — 0502F SUBSEQUENT PRENATAL CARE: CPT | Performed by: OBSTETRICS & GYNECOLOGY

## 2019-11-16 PROCEDURE — 81002 URINALYSIS NONAUTO W/O SCOPE: CPT | Performed by: OBSTETRICS & GYNECOLOGY

## 2019-12-07 ENCOUNTER — TELEPHONE (OUTPATIENT)
Dept: OBGYN CLINIC | Facility: CLINIC | Age: 29
End: 2019-12-07

## 2019-12-07 NOTE — TELEPHONE ENCOUNTER
Informed pt that [address] stated she should watch it and see if it happens again. Pt stated understanding.

## 2019-12-07 NOTE — TELEPHONE ENCOUNTER
16w5d    Pt states she woke up from a dull pain 3:00 am today. She rates it a 6/10 and it went way after 30 minutes. Did not take any medication for the pain. It was on the right side under her breast, 3\" down. Pt states that she went back to bed.     P

## 2019-12-16 ENCOUNTER — ROUTINE PRENATAL (OUTPATIENT)
Dept: OBGYN CLINIC | Facility: CLINIC | Age: 29
End: 2019-12-16
Payer: MEDICAID

## 2019-12-16 VITALS
WEIGHT: 166.19 LBS | BODY MASS INDEX: 30 KG/M2 | HEART RATE: 85 BPM | SYSTOLIC BLOOD PRESSURE: 116 MMHG | DIASTOLIC BLOOD PRESSURE: 75 MMHG

## 2019-12-16 DIAGNOSIS — Z34.91 ENCOUNTER FOR SUPERVISION OF NORMAL PREGNANCY IN FIRST TRIMESTER, UNSPECIFIED GRAVIDITY: Primary | ICD-10-CM

## 2019-12-16 PROCEDURE — 0502F SUBSEQUENT PRENATAL CARE: CPT | Performed by: OBSTETRICS & GYNECOLOGY

## 2019-12-16 PROCEDURE — 90471 IMMUNIZATION ADMIN: CPT | Performed by: OBSTETRICS & GYNECOLOGY

## 2019-12-16 PROCEDURE — 81002 URINALYSIS NONAUTO W/O SCOPE: CPT | Performed by: OBSTETRICS & GYNECOLOGY

## 2019-12-16 PROCEDURE — 90686 IIV4 VACC NO PRSV 0.5 ML IM: CPT | Performed by: OBSTETRICS & GYNECOLOGY

## 2019-12-29 NOTE — PROGRESS NOTES
Outpatient Maternal-Fetal Medicine Consultation    Dear Dr. Diallo Current    Thank you for requesting ultrasound evaluation and maternal fetal medicine consultation on your patient Monica Choudhury.   As you are aware she is a 34year old female  with a ambrosio not taking: Reported on 12/16/2019), Disp: 90 tablet, Rfl: 0  •  NABUMETONE 750 MG Oral Tab, TAKE 1 TABLET(750 MG) BY MOUTH TWICE DAILY WITH MEALS FOR PAIN OR INFLAMMATION (Patient not taking: Reported on 12/16/2019), Disp: 180 tablet, Rfl: 1  •  triamcino pregnant women with ongoing exposure regardless of symptoms in accordance with CDC recommendations.  Information and recommendations regarding Zika virus screening and testing are evolving, and obstetrician–gynecologists and other health care providers teodorau from symptom onset or exposure to attempt pregnancy. This includes women with diagnosed Zika virus infection.   · To prevent possible infection during time periods near or around fertilization, if a male partner has possible Zika virus exposure, regardless use of ultrasonography to evaluate for fetal abnormalities consistent with congenital Zika virus syndrome is recommended.   · Obstetrician–gynecologists and other obstetric care providers should have a system to ensure relevant information regarding a woman

## 2019-12-30 ENCOUNTER — HOSPITAL ENCOUNTER (OUTPATIENT)
Dept: PERINATAL CARE | Facility: HOSPITAL | Age: 29
Discharge: HOME | End: 2019-12-30
Attending: OBSTETRICS & GYNECOLOGY
Payer: MEDICAID

## 2019-12-30 VITALS
HEART RATE: 81 BPM | DIASTOLIC BLOOD PRESSURE: 64 MMHG | HEIGHT: 63 IN | BODY MASS INDEX: 29.41 KG/M2 | WEIGHT: 166 LBS | SYSTOLIC BLOOD PRESSURE: 114 MMHG

## 2019-12-30 DIAGNOSIS — Z20.821 ZIKA VIRUS EXPOSURE AFFECTING PREGNANCY: ICD-10-CM

## 2019-12-30 DIAGNOSIS — O99.891 ZIKA VIRUS EXPOSURE AFFECTING PREGNANCY: Primary | ICD-10-CM

## 2019-12-30 DIAGNOSIS — Z20.821 ZIKA VIRUS EXPOSURE AFFECTING PREGNANCY: Primary | ICD-10-CM

## 2019-12-30 DIAGNOSIS — O99.891 ZIKA VIRUS EXPOSURE AFFECTING PREGNANCY: ICD-10-CM

## 2019-12-30 PROCEDURE — 76811 OB US DETAILED SNGL FETUS: CPT | Performed by: OBSTETRICS & GYNECOLOGY

## 2019-12-30 PROCEDURE — 99203 OFFICE O/P NEW LOW 30 MIN: CPT | Performed by: OBSTETRICS & GYNECOLOGY

## 2020-01-13 ENCOUNTER — ROUTINE PRENATAL (OUTPATIENT)
Dept: OBGYN CLINIC | Facility: CLINIC | Age: 30
End: 2020-01-13
Payer: MEDICAID

## 2020-01-13 VITALS
WEIGHT: 166 LBS | HEART RATE: 85 BPM | DIASTOLIC BLOOD PRESSURE: 74 MMHG | SYSTOLIC BLOOD PRESSURE: 115 MMHG | BODY MASS INDEX: 29 KG/M2

## 2020-01-13 DIAGNOSIS — Z34.82 ENCOUNTER FOR SUPERVISION OF OTHER NORMAL PREGNANCY IN SECOND TRIMESTER: Primary | ICD-10-CM

## 2020-01-13 LAB
APPEARANCE: CLEAR
MULTISTIX LOT#: NORMAL NUMERIC
URINE-COLOR: YELLOW

## 2020-01-13 PROCEDURE — 0502F SUBSEQUENT PRENATAL CARE: CPT | Performed by: OBSTETRICS & GYNECOLOGY

## 2020-01-13 PROCEDURE — 81002 URINALYSIS NONAUTO W/O SCOPE: CPT | Performed by: OBSTETRICS & GYNECOLOGY

## 2020-01-20 ENCOUNTER — TELEPHONE (OUTPATIENT)
Dept: PERINATAL CARE | Facility: HOSPITAL | Age: 30
End: 2020-01-20

## 2020-01-21 ENCOUNTER — TELEPHONE (OUTPATIENT)
Dept: OBGYN CLINIC | Facility: CLINIC | Age: 30
End: 2020-01-21

## 2020-01-21 NOTE — TELEPHONE ENCOUNTER
GOT STAFF MESSAGE REQUESTING AUTHORIZATION FOR FETAL ECHO SCHEDULED WITH [facility] ON 1-27-20. CALLED [facility], SPOKE WITH MISS Barbara Tidwell, GIVEN CASE #[id number]. TRANSFERRED TO MONICA BAUTISTA, CLINICAL NURSE REVIEWER.   CLINICAL INFO GIVEN INCLUDING THE SUBOPTIMAL CARD

## 2020-01-24 ENCOUNTER — TELEPHONE (OUTPATIENT)
Dept: PERINATAL CARE | Facility: HOSPITAL | Age: 30
End: 2020-01-24

## 2020-01-24 NOTE — TELEPHONE ENCOUNTER
RECEIVED VOICE MAIL MESSAGE FROM [employer] IN [facility] ASKING ABOUT STATUS OF AUTH FOR APPT ON MON, 1-27-20. I CALLED [facility] AND WAS TOLD THE FETAL ECHO WAS NOT APPROVED AND WILL HAVE TO FILE AN APPEAL.

## 2020-01-24 NOTE — TELEPHONE ENCOUNTER
LVM informing patient the need to reschedule her appt for Fetal Echo scheduled on 1/27/20.   Per OB office appointment has been sent to insurance appeal.  Instructions given to patient to confirm receipt of message and need to reschedule

## 2020-02-03 NOTE — TELEPHONE ENCOUNTER
APPOINTMENT OF REPRESENTATIVE FORM TO K[facility] TO SIGN. ONCE SIGNED WILL NEED TO FAX CLINICALS AND SCHEDULE PEER TO PEER TO GET AUTH FOR FETAL ECHO, CPT: 92874.

## 2020-02-04 NOTE — TELEPHONE ENCOUNTER
Spoke with Germán and vqae-of-depf scheduled for 2/6/2020 at 1230pm with KVB and Dr. Rosa Jenkins. Phone number given to triage for Dr. Rosa Jenkins to call. KCB aware of time and date of ohyy-pg-vbtc review. Clinical notes faxed to [phone].

## 2020-02-04 NOTE — TELEPHONE ENCOUNTER
Per evacore,  Needs to submit a fax stating pt needs to downgrade to a standard appeal  Also needs AOR form    Fax# [phone]

## 2020-02-05 ENCOUNTER — TELEPHONE (OUTPATIENT)
Dept: PERINATAL CARE | Facility: HOSPITAL | Age: 30
End: 2020-02-05

## 2020-02-05 NOTE — TELEPHONE ENCOUNTER
YAWM notifying patient that we need to cancel her appointment scheduled for 2/6/2020.   Informed her that we will hopefully be able to reschedule after her Peer to Peer evaluation on 2/6/2020

## 2020-02-06 ENCOUNTER — TELEPHONE (OUTPATIENT)
Dept: OBGYN CLINIC | Facility: CLINIC | Age: 30
End: 2020-02-06

## 2020-02-06 DIAGNOSIS — Z36.83 ENCOUNTER FOR FETAL SCREENING FOR CONGENITAL CARDIAC ABNORMALITIES: Primary | ICD-10-CM

## 2020-02-13 ENCOUNTER — ROUTINE PRENATAL (OUTPATIENT)
Dept: OBGYN CLINIC | Facility: CLINIC | Age: 30
End: 2020-02-13
Payer: MEDICAID

## 2020-02-13 VITALS
SYSTOLIC BLOOD PRESSURE: 114 MMHG | WEIGHT: 172 LBS | HEART RATE: 94 BPM | BODY MASS INDEX: 30 KG/M2 | DIASTOLIC BLOOD PRESSURE: 71 MMHG

## 2020-02-13 DIAGNOSIS — Z34.82 ENCOUNTER FOR SUPERVISION OF OTHER NORMAL PREGNANCY IN SECOND TRIMESTER: Primary | ICD-10-CM

## 2020-02-13 PROBLEM — Z00.00 ADULT GENERAL MEDICAL EXAM: Status: RESOLVED | Noted: 2017-09-19 | Resolved: 2020-02-13

## 2020-02-13 LAB
APPEARANCE: CLEAR
MULTISTIX LOT#: NORMAL NUMERIC
URINE-COLOR: YELLOW

## 2020-02-13 PROCEDURE — 0502F SUBSEQUENT PRENATAL CARE: CPT | Performed by: OBSTETRICS & GYNECOLOGY

## 2020-02-13 PROCEDURE — 81002 URINALYSIS NONAUTO W/O SCOPE: CPT | Performed by: OBSTETRICS & GYNECOLOGY

## 2020-02-13 RX ORDER — NITROFURANTOIN 25; 75 MG/1; MG/1
100 CAPSULE ORAL 2 TIMES DAILY
Qty: 14 CAPSULE | Refills: 0 | Status: SHIPPED | OUTPATIENT
Start: 2020-02-13 | End: 2020-02-20

## 2020-02-13 NOTE — PROGRESS NOTES
occas racing heart. No CP / SOB. If increases in freq or other Sx, then see PCP / cardiology. Macrobid. RTC 2 wks. Needs Rhogam at next visit. Needs f/u u/s w/ MFM for limited views of heart.

## 2020-02-15 ENCOUNTER — APPOINTMENT (OUTPATIENT)
Dept: LAB | Facility: HOSPITAL | Age: 30
End: 2020-02-15
Attending: OBSTETRICS & GYNECOLOGY
Payer: MEDICAID

## 2020-02-15 DIAGNOSIS — Z34.82 ENCOUNTER FOR SUPERVISION OF OTHER NORMAL PREGNANCY IN SECOND TRIMESTER: ICD-10-CM

## 2020-02-15 LAB
ANTIBODY SCREEN: NEGATIVE
DEPRECATED RDW RBC AUTO: 37.3 FL (ref 35.1–46.3)
ERYTHROCYTE [DISTWIDTH] IN BLOOD BY AUTOMATED COUNT: 12.3 % (ref 11–15)
GLUCOSE 1H P GLC SERPL-MCNC: 83 MG/DL
HCT VFR BLD AUTO: 35.2 % (ref 35–48)
HGB BLD-MCNC: 11.4 G/DL (ref 12–16)
MCH RBC QN AUTO: 27.3 PG (ref 26–34)
MCHC RBC AUTO-ENTMCNC: 32.4 G/DL (ref 31–37)
MCV RBC AUTO: 84.2 FL (ref 80–100)
PLATELET # BLD AUTO: 225 10(3)UL (ref 150–450)
RBC # BLD AUTO: 4.18 X10(6)UL (ref 3.8–5.3)
RH BLOOD TYPE: NEGATIVE
WBC # BLD AUTO: 9.7 X10(3) UL (ref 4–11)

## 2020-02-15 PROCEDURE — 86901 BLOOD TYPING SEROLOGIC RH(D): CPT

## 2020-02-15 PROCEDURE — 86900 BLOOD TYPING SEROLOGIC ABO: CPT

## 2020-02-15 PROCEDURE — 36415 COLL VENOUS BLD VENIPUNCTURE: CPT

## 2020-02-15 PROCEDURE — 82950 GLUCOSE TEST: CPT

## 2020-02-15 PROCEDURE — 86850 RBC ANTIBODY SCREEN: CPT

## 2020-02-15 PROCEDURE — 85027 COMPLETE CBC AUTOMATED: CPT

## 2020-02-26 ENCOUNTER — ROUTINE PRENATAL (OUTPATIENT)
Dept: OBGYN CLINIC | Facility: CLINIC | Age: 30
End: 2020-02-26
Payer: MEDICAID

## 2020-02-26 VITALS
WEIGHT: 175.38 LBS | SYSTOLIC BLOOD PRESSURE: 109 MMHG | BODY MASS INDEX: 31 KG/M2 | DIASTOLIC BLOOD PRESSURE: 71 MMHG | HEART RATE: 80 BPM

## 2020-02-26 DIAGNOSIS — Z34.82 ENCOUNTER FOR SUPERVISION OF OTHER NORMAL PREGNANCY IN SECOND TRIMESTER: Primary | ICD-10-CM

## 2020-02-26 DIAGNOSIS — O26.893 RH NEGATIVE STATUS DURING PREGNANCY IN THIRD TRIMESTER: ICD-10-CM

## 2020-02-26 DIAGNOSIS — O26.899 RH NEGATIVE STATE IN ANTEPARTUM PERIOD: ICD-10-CM

## 2020-02-26 DIAGNOSIS — Z67.91 RH NEGATIVE STATUS DURING PREGNANCY IN THIRD TRIMESTER: ICD-10-CM

## 2020-02-26 DIAGNOSIS — Z67.91 RH NEGATIVE STATE IN ANTEPARTUM PERIOD: ICD-10-CM

## 2020-02-26 LAB
MULTISTIX EXPIRATION DATE: NORMAL DATE
MULTISTIX LOT#: NORMAL NUMERIC
PH, URINE: 7 (ref 4.5–8)
SPECIFIC GRAVITY: 1.01 (ref 1–1.03)
UROBILINOGEN,SEMI-QN: 0.2 MG/DL (ref 0–1.9)

## 2020-02-26 PROCEDURE — 96372 THER/PROPH/DIAG INJ SC/IM: CPT | Performed by: OBSTETRICS & GYNECOLOGY

## 2020-02-26 PROCEDURE — 81002 URINALYSIS NONAUTO W/O SCOPE: CPT | Performed by: OBSTETRICS & GYNECOLOGY

## 2020-02-26 PROCEDURE — 0502F SUBSEQUENT PRENATAL CARE: CPT | Performed by: OBSTETRICS & GYNECOLOGY

## 2020-02-26 NOTE — PROGRESS NOTES
Verbal from GISELLA for pt to receive rhogam injection today. Pt is O negative. Rhogam administered to pts right upper outer gluteus. Pt tolerated injection well. VIS info sheet and rhogam card given to pt.

## 2020-02-26 NOTE — PATIENT INSTRUCTIONS
If You Are Rh Negative – Routine Administration    If you’re Rh negative, ask your health care provider about getting treated with RhoGam or Rhophylac. Even if you miscarry or don’t deliver the baby, you will still need treatment.  The health of any baby as directed      Location, date and time:  [facility]  2/26/2020 @  4:40PM

## 2020-02-28 NOTE — PROGRESS NOTES
Rosa Mckee    Dear Dr. Nivia Mcfarland    Thank you for requesting ultrasound evaluation and maternal fetal medicine consultation on your patient Savi Keen.   As you are aware she is a 34year old female  with a ambrosio

## 2020-03-01 NOTE — PROGRESS NOTES
Rhogam today. No S/S of PTL. She reports episodes of palpitations twice weekly lasting about 10-20 minutes. She states pulse around 130 per her watch. She relates this to salty / sugary food intake and not activity or time of day. No CP, SOB or syncope.  G

## 2020-03-02 ENCOUNTER — HOSPITAL ENCOUNTER (OUTPATIENT)
Dept: PERINATAL CARE | Facility: HOSPITAL | Age: 30
Discharge: HOME | End: 2020-03-02
Attending: OBSTETRICS & GYNECOLOGY
Payer: MEDICAID

## 2020-03-02 VITALS
BODY MASS INDEX: 30.48 KG/M2 | HEIGHT: 63 IN | DIASTOLIC BLOOD PRESSURE: 64 MMHG | HEART RATE: 88 BPM | WEIGHT: 172 LBS | SYSTOLIC BLOOD PRESSURE: 112 MMHG

## 2020-03-02 DIAGNOSIS — Z36.83 ENCOUNTER FOR FETAL SCREENING FOR CONGENITAL CARDIAC ABNORMALITIES: ICD-10-CM

## 2020-03-02 PROCEDURE — 99213 OFFICE O/P EST LOW 20 MIN: CPT | Performed by: OBSTETRICS & GYNECOLOGY

## 2020-03-02 PROCEDURE — 76815 OB US LIMITED FETUS(S): CPT | Performed by: OBSTETRICS & GYNECOLOGY

## 2020-03-09 ENCOUNTER — TELEPHONE (OUTPATIENT)
Dept: OBGYN CLINIC | Facility: CLINIC | Age: 30
End: 2020-03-09

## 2020-03-11 ENCOUNTER — ROUTINE PRENATAL (OUTPATIENT)
Dept: OBGYN CLINIC | Facility: CLINIC | Age: 30
End: 2020-03-11
Payer: MEDICAID

## 2020-03-11 VITALS
WEIGHT: 174 LBS | BODY MASS INDEX: 31 KG/M2 | SYSTOLIC BLOOD PRESSURE: 102 MMHG | HEART RATE: 87 BPM | DIASTOLIC BLOOD PRESSURE: 63 MMHG

## 2020-03-11 DIAGNOSIS — Z34.83 ENCOUNTER FOR SUPERVISION OF OTHER NORMAL PREGNANCY IN THIRD TRIMESTER: Primary | ICD-10-CM

## 2020-03-11 LAB
APPEARANCE: CLEAR
MULTISTIX LOT#: NORMAL NUMERIC
URINE-COLOR: YELLOW

## 2020-03-11 PROCEDURE — 81002 URINALYSIS NONAUTO W/O SCOPE: CPT | Performed by: OBSTETRICS & GYNECOLOGY

## 2020-03-11 PROCEDURE — 0502F SUBSEQUENT PRENATAL CARE: CPT | Performed by: OBSTETRICS & GYNECOLOGY

## 2020-03-11 NOTE — PROGRESS NOTES
No complaints. Normal heart views on repeat US. No complaints about palpitations. She is feeling well and feels good fetal movement. Reviewed kick counts.   RTC 2 wks

## 2020-03-13 NOTE — TELEPHONE ENCOUNTER
RECEIVED DENIAL LETTER FOR CPT: 74542 (SCHEDULED WITH [facility] ON 3-23) STATING THERE IS ALREADY AN APPROVAL FOR THE SAME OR A SIMILAR STUDY. PT HAD CPT: 31420 (FOLLOW UP FETAL HEART) DONE 3-2-20.   I CALLED [facility] TO CONFIRM THE DENIAL IS BECAUSE CPT [id number] AN

## 2020-03-17 NOTE — PROGRESS NOTES
Nicanor Bang  Dear Dr. Carolina Bruno     Thank you for requesting ultrasound evaluation and maternal fetal medicine consultation on your patient [facility].   As you are aware she is a 34year old female [id number] with a singlet in review of records, consultation and coordination of care. Our discussion is summarized above. The approximate physician face-to-face time was 15 minutes.

## 2020-03-23 ENCOUNTER — HOSPITAL ENCOUNTER (OUTPATIENT)
Dept: PERINATAL CARE | Facility: HOSPITAL | Age: 30
Discharge: HOME | End: 2020-03-23
Attending: OBSTETRICS & GYNECOLOGY
Payer: MEDICAID

## 2020-03-23 VITALS
HEIGHT: 63 IN | SYSTOLIC BLOOD PRESSURE: 117 MMHG | DIASTOLIC BLOOD PRESSURE: 77 MMHG | WEIGHT: 174 LBS | BODY MASS INDEX: 30.83 KG/M2 | HEART RATE: 90 BPM

## 2020-03-23 DIAGNOSIS — O99.891 ZIKA VIRUS EXPOSURE AFFECTING PREGNANCY: Primary | ICD-10-CM

## 2020-03-23 DIAGNOSIS — Z20.821 ZIKA VIRUS EXPOSURE AFFECTING PREGNANCY: Primary | ICD-10-CM

## 2020-03-23 DIAGNOSIS — O99.891 ZIKA VIRUS EXPOSURE AFFECTING PREGNANCY: ICD-10-CM

## 2020-03-23 DIAGNOSIS — Z20.821 ZIKA VIRUS EXPOSURE AFFECTING PREGNANCY: ICD-10-CM

## 2020-03-23 PROCEDURE — 76816 OB US FOLLOW-UP PER FETUS: CPT | Performed by: OBSTETRICS & GYNECOLOGY

## 2020-03-23 PROCEDURE — 76819 FETAL BIOPHYS PROFIL W/O NST: CPT | Performed by: OBSTETRICS & GYNECOLOGY

## 2020-03-23 PROCEDURE — 99213 OFFICE O/P EST LOW 20 MIN: CPT | Performed by: OBSTETRICS & GYNECOLOGY

## 2020-03-28 ENCOUNTER — TELEPHONE (OUTPATIENT)
Dept: OBGYN CLINIC | Facility: CLINIC | Age: 30
End: 2020-03-28

## 2020-03-28 ENCOUNTER — ROUTINE PRENATAL (OUTPATIENT)
Dept: OBGYN CLINIC | Facility: CLINIC | Age: 30
End: 2020-03-28
Payer: MEDICAID

## 2020-03-28 VITALS
BODY MASS INDEX: 31 KG/M2 | SYSTOLIC BLOOD PRESSURE: 112 MMHG | HEART RATE: 84 BPM | WEIGHT: 174.63 LBS | DIASTOLIC BLOOD PRESSURE: 76 MMHG

## 2020-03-28 DIAGNOSIS — Z34.93 ENCOUNTER FOR SUPERVISION OF NORMAL PREGNANCY IN THIRD TRIMESTER, UNSPECIFIED GRAVIDITY: Primary | ICD-10-CM

## 2020-03-28 LAB
APPEARANCE: CLEAR
MULTISTIX LOT#: NORMAL NUMERIC
PH, URINE: 7 (ref 4.5–8)
SPECIFIC GRAVITY: 1.01 (ref 1–1.03)
URINE-COLOR: YELLOW

## 2020-03-28 PROCEDURE — 81002 URINALYSIS NONAUTO W/O SCOPE: CPT | Performed by: OBSTETRICS & GYNECOLOGY

## 2020-03-28 PROCEDURE — 0502F SUBSEQUENT PRENATAL CARE: CPT | Performed by: OBSTETRICS & GYNECOLOGY

## 2020-03-28 NOTE — PROGRESS NOTES
Feels good fetal movement. Denies contractions, bleeding, cramping LOF. Denies SOB and CP. Denies headaches. Reviewed kick counts and PTL precautions.    Reviewed some appointments may be phone apt

## 2020-04-11 ENCOUNTER — ROUTINE PRENATAL (OUTPATIENT)
Dept: OBGYN CLINIC | Facility: CLINIC | Age: 30
End: 2020-04-11
Payer: MEDICAID

## 2020-04-11 VITALS
SYSTOLIC BLOOD PRESSURE: 113 MMHG | BODY MASS INDEX: 31 KG/M2 | WEIGHT: 176 LBS | DIASTOLIC BLOOD PRESSURE: 72 MMHG | HEART RATE: 78 BPM

## 2020-04-11 DIAGNOSIS — Z34.93 ENCOUNTER FOR SUPERVISION OF NORMAL PREGNANCY IN THIRD TRIMESTER, UNSPECIFIED GRAVIDITY: Primary | ICD-10-CM

## 2020-04-11 PROCEDURE — 0502F SUBSEQUENT PRENATAL CARE: CPT | Performed by: OBSTETRICS & GYNECOLOGY

## 2020-04-11 PROCEDURE — 81002 URINALYSIS NONAUTO W/O SCOPE: CPT | Performed by: OBSTETRICS & GYNECOLOGY

## 2020-04-25 ENCOUNTER — LAB ENCOUNTER (OUTPATIENT)
Dept: LAB | Facility: HOSPITAL | Age: 30
End: 2020-04-25
Attending: OBSTETRICS & GYNECOLOGY
Payer: MEDICAID

## 2020-04-25 ENCOUNTER — ROUTINE PRENATAL (OUTPATIENT)
Dept: OBGYN CLINIC | Facility: CLINIC | Age: 30
End: 2020-04-25
Payer: MEDICAID

## 2020-04-25 VITALS
DIASTOLIC BLOOD PRESSURE: 74 MMHG | SYSTOLIC BLOOD PRESSURE: 107 MMHG | HEART RATE: 99 BPM | WEIGHT: 179 LBS | BODY MASS INDEX: 32 KG/M2

## 2020-04-25 DIAGNOSIS — Z34.93 ENCOUNTER FOR SUPERVISION OF NORMAL PREGNANCY IN THIRD TRIMESTER, UNSPECIFIED GRAVIDITY: Primary | ICD-10-CM

## 2020-04-25 DIAGNOSIS — Z34.93 ENCOUNTER FOR SUPERVISION OF NORMAL PREGNANCY IN THIRD TRIMESTER, UNSPECIFIED GRAVIDITY: ICD-10-CM

## 2020-04-25 PROCEDURE — 81002 URINALYSIS NONAUTO W/O SCOPE: CPT | Performed by: OBSTETRICS & GYNECOLOGY

## 2020-04-25 PROCEDURE — 86780 TREPONEMA PALLIDUM: CPT

## 2020-04-25 PROCEDURE — 85027 COMPLETE CBC AUTOMATED: CPT

## 2020-04-25 PROCEDURE — 36415 COLL VENOUS BLD VENIPUNCTURE: CPT

## 2020-04-25 PROCEDURE — 0502F SUBSEQUENT PRENATAL CARE: CPT | Performed by: OBSTETRICS & GYNECOLOGY

## 2020-04-25 PROCEDURE — 87389 HIV-1 AG W/HIV-1&-2 AB AG IA: CPT

## 2020-04-29 ENCOUNTER — ROUTINE PRENATAL (OUTPATIENT)
Dept: OBGYN CLINIC | Facility: CLINIC | Age: 30
End: 2020-04-29
Payer: MEDICAID

## 2020-04-29 VITALS
DIASTOLIC BLOOD PRESSURE: 73 MMHG | HEART RATE: 80 BPM | WEIGHT: 179.81 LBS | SYSTOLIC BLOOD PRESSURE: 108 MMHG | BODY MASS INDEX: 32 KG/M2

## 2020-04-29 DIAGNOSIS — Z34.83 ENCOUNTER FOR SUPERVISION OF OTHER NORMAL PREGNANCY IN THIRD TRIMESTER: Primary | ICD-10-CM

## 2020-04-29 PROCEDURE — 0502F SUBSEQUENT PRENATAL CARE: CPT | Performed by: OBSTETRICS & GYNECOLOGY

## 2020-04-29 PROCEDURE — 81002 URINALYSIS NONAUTO W/O SCOPE: CPT | Performed by: OBSTETRICS & GYNECOLOGY

## 2020-05-06 ENCOUNTER — ROUTINE PRENATAL (OUTPATIENT)
Dept: OBGYN CLINIC | Facility: CLINIC | Age: 30
End: 2020-05-06
Payer: MEDICAID

## 2020-05-06 VITALS
SYSTOLIC BLOOD PRESSURE: 113 MMHG | BODY MASS INDEX: 32 KG/M2 | DIASTOLIC BLOOD PRESSURE: 76 MMHG | HEART RATE: 86 BPM | WEIGHT: 181 LBS

## 2020-05-06 DIAGNOSIS — Z34.93 ENCOUNTER FOR SUPERVISION OF NORMAL PREGNANCY IN THIRD TRIMESTER, UNSPECIFIED GRAVIDITY: Primary | ICD-10-CM

## 2020-05-06 PROCEDURE — 0502F SUBSEQUENT PRENATAL CARE: CPT | Performed by: OBSTETRICS & GYNECOLOGY

## 2020-05-06 PROCEDURE — 81002 URINALYSIS NONAUTO W/O SCOPE: CPT | Performed by: OBSTETRICS & GYNECOLOGY

## 2020-05-08 ENCOUNTER — TELEPHONE (OUTPATIENT)
Dept: OBGYN CLINIC | Facility: CLINIC | Age: 30
End: 2020-05-08

## 2020-05-08 NOTE — TELEPHONE ENCOUNTER
Received form from [facility] for breast pump. Filled out and faxed back to [facility].

## 2020-05-14 ENCOUNTER — ROUTINE PRENATAL (OUTPATIENT)
Dept: OBGYN CLINIC | Facility: CLINIC | Age: 30
End: 2020-05-14
Payer: MEDICAID

## 2020-05-14 VITALS
DIASTOLIC BLOOD PRESSURE: 79 MMHG | BODY MASS INDEX: 32 KG/M2 | HEART RATE: 96 BPM | SYSTOLIC BLOOD PRESSURE: 116 MMHG | WEIGHT: 182.63 LBS

## 2020-05-14 DIAGNOSIS — Z34.93 ENCOUNTER FOR SUPERVISION OF NORMAL PREGNANCY IN THIRD TRIMESTER, UNSPECIFIED GRAVIDITY: Primary | ICD-10-CM

## 2020-05-14 PROCEDURE — 81002 URINALYSIS NONAUTO W/O SCOPE: CPT | Performed by: OBSTETRICS & GYNECOLOGY

## 2020-05-14 PROCEDURE — 0502F SUBSEQUENT PRENATAL CARE: CPT | Performed by: OBSTETRICS & GYNECOLOGY

## 2020-05-18 ENCOUNTER — ANESTHESIA EVENT (OUTPATIENT)
Dept: OBGYN UNIT | Facility: HOSPITAL | Age: 30
End: 2020-05-18
Payer: MEDICAID

## 2020-05-18 ENCOUNTER — HOSPITAL ENCOUNTER (OUTPATIENT)
Facility: HOSPITAL | Age: 30
Setting detail: OBSERVATION
Discharge: HOME | End: 2020-05-18
Attending: OBSTETRICS & GYNECOLOGY | Admitting: OBSTETRICS & GYNECOLOGY
Payer: MEDICAID

## 2020-05-18 ENCOUNTER — ANESTHESIA (OUTPATIENT)
Dept: OBGYN UNIT | Facility: HOSPITAL | Age: 30
End: 2020-05-18
Payer: MEDICAID

## 2020-05-18 ENCOUNTER — HOSPITAL ENCOUNTER (INPATIENT)
Facility: HOSPITAL | Age: 30
LOS: 1 days | Discharge: HOME | End: 2020-05-19
Attending: OBSTETRICS & GYNECOLOGY | Admitting: OBSTETRICS & GYNECOLOGY
Payer: MEDICAID

## 2020-05-18 ENCOUNTER — TELEPHONE (OUTPATIENT)
Dept: OBGYN CLINIC | Facility: CLINIC | Age: 30
End: 2020-05-18

## 2020-05-18 VITALS
DIASTOLIC BLOOD PRESSURE: 71 MMHG | HEART RATE: 108 BPM | RESPIRATION RATE: 18 BRPM | SYSTOLIC BLOOD PRESSURE: 102 MMHG | TEMPERATURE: 98 F

## 2020-05-18 PROBLEM — O47.9 IRREGULAR CONTRACTIONS (HCC): Status: ACTIVE | Noted: 2020-05-18

## 2020-05-18 PROBLEM — Z37.9 NORMAL LABOR: Status: ACTIVE | Noted: 2020-05-18

## 2020-05-18 PROBLEM — Z34.90 PREGNANT: Status: ACTIVE | Noted: 2020-05-18

## 2020-05-18 PROBLEM — Z34.90 PREGNANT (HCC): Status: ACTIVE | Noted: 2020-05-18

## 2020-05-18 PROBLEM — Z37.9 NORMAL LABOR (HCC): Status: ACTIVE | Noted: 2020-05-18

## 2020-05-18 PROBLEM — O47.9 IRREGULAR CONTRACTIONS: Status: ACTIVE | Noted: 2020-05-18

## 2020-05-18 PROCEDURE — 59025 FETAL NON-STRESS TEST: CPT | Performed by: OBSTETRICS & GYNECOLOGY

## 2020-05-18 PROCEDURE — 59409 OBSTETRICAL CARE: CPT | Performed by: OBSTETRICS & GYNECOLOGY

## 2020-05-18 RX ORDER — EPHEDRINE SULFATE/0.9% NACL/PF 25 MG/5 ML
5 SYRINGE (ML) INTRAVENOUS AS NEEDED
Status: DISCONTINUED | OUTPATIENT
Start: 2020-05-18 | End: 2020-05-19

## 2020-05-18 RX ORDER — SIMETHICONE 80 MG
80 TABLET,CHEWABLE ORAL 3 TIMES DAILY PRN
Status: DISCONTINUED | OUTPATIENT
Start: 2020-05-18 | End: 2020-05-19

## 2020-05-18 RX ORDER — BUPIVACAINE HYDROCHLORIDE 2.5 MG/ML
30 INJECTION, SOLUTION EPIDURAL; INFILTRATION; INTRACAUDAL ONCE
Status: DISCONTINUED | OUTPATIENT
Start: 2020-05-18 | End: 2020-05-19

## 2020-05-18 RX ORDER — ONDANSETRON 2 MG/ML
4 INJECTION INTRAMUSCULAR; INTRAVENOUS EVERY 6 HOURS PRN
Status: DISCONTINUED | OUTPATIENT
Start: 2020-05-18 | End: 2020-05-18

## 2020-05-18 RX ORDER — DIPHENHYDRAMINE HYDROCHLORIDE 50 MG/ML
12.5 INJECTION INTRAMUSCULAR; INTRAVENOUS EVERY 4 HOURS PRN
Status: DISCONTINUED | OUTPATIENT
Start: 2020-05-18 | End: 2020-05-19

## 2020-05-18 RX ORDER — TERBUTALINE SULFATE 1 MG/ML
0.25 INJECTION, SOLUTION SUBCUTANEOUS AS NEEDED
Status: DISCONTINUED | OUTPATIENT
Start: 2020-05-18 | End: 2020-05-18 | Stop reason: HOSPADM

## 2020-05-18 RX ORDER — TRISODIUM CITRATE DIHYDRATE AND CITRIC ACID MONOHYDRATE 500; 334 MG/5ML; MG/5ML
30 SOLUTION ORAL AS NEEDED
Status: DISCONTINUED | OUTPATIENT
Start: 2020-05-18 | End: 2020-05-18 | Stop reason: HOSPADM

## 2020-05-18 RX ORDER — DEXTROSE, SODIUM CHLORIDE, SODIUM LACTATE, POTASSIUM CHLORIDE, AND CALCIUM CHLORIDE 5; .6; .31; .03; .02 G/100ML; G/100ML; G/100ML; G/100ML; G/100ML
INJECTION, SOLUTION INTRAVENOUS CONTINUOUS
Status: DISCONTINUED | OUTPATIENT
Start: 2020-05-18 | End: 2020-05-18 | Stop reason: HOSPADM

## 2020-05-18 RX ORDER — AMMONIA INHALANTS 0.04 G/.3ML
0.3 INHALANT RESPIRATORY (INHALATION) AS NEEDED
Status: DISCONTINUED | OUTPATIENT
Start: 2020-05-18 | End: 2020-05-18

## 2020-05-18 RX ORDER — ACETAMINOPHEN 325 MG/1
650 TABLET ORAL EVERY 6 HOURS PRN
Status: DISCONTINUED | OUTPATIENT
Start: 2020-05-18 | End: 2020-05-19

## 2020-05-18 RX ORDER — LIDOCAINE HYDROCHLORIDE 10 MG/ML
30 INJECTION, SOLUTION EPIDURAL; INFILTRATION; INTRACAUDAL; PERINEURAL ONCE
Status: DISCONTINUED | OUTPATIENT
Start: 2020-05-18 | End: 2020-05-18 | Stop reason: HOSPADM

## 2020-05-18 RX ORDER — LIDOCAINE HYDROCHLORIDE AND EPINEPHRINE 15; 5 MG/ML; UG/ML
INJECTION, SOLUTION EPIDURAL
Status: COMPLETED | OUTPATIENT
Start: 2020-05-18 | End: 2020-05-18

## 2020-05-18 RX ORDER — DOCUSATE SODIUM 100 MG/1
100 CAPSULE, LIQUID FILLED ORAL 2 TIMES DAILY
Status: DISCONTINUED | OUTPATIENT
Start: 2020-05-18 | End: 2020-05-19

## 2020-05-18 RX ORDER — ONDANSETRON 2 MG/ML
4 INJECTION INTRAMUSCULAR; INTRAVENOUS EVERY 6 HOURS PRN
Status: DISCONTINUED | OUTPATIENT
Start: 2020-05-18 | End: 2020-05-19

## 2020-05-18 RX ORDER — IBUPROFEN 600 MG/1
600 TABLET ORAL EVERY 6 HOURS PRN
Status: DISCONTINUED | OUTPATIENT
Start: 2020-05-18 | End: 2020-05-18

## 2020-05-18 RX ORDER — BISACODYL 10 MG
10 SUPPOSITORY, RECTAL RECTAL ONCE AS NEEDED
Status: DISCONTINUED | OUTPATIENT
Start: 2020-05-18 | End: 2020-05-19

## 2020-05-18 RX ORDER — LIDOCAINE HYDROCHLORIDE AND EPINEPHRINE 20; 5 MG/ML; UG/ML
20 INJECTION, SOLUTION EPIDURAL; INFILTRATION; INTRACAUDAL; PERINEURAL ONCE
Status: DISCONTINUED | OUTPATIENT
Start: 2020-05-18 | End: 2020-05-19

## 2020-05-18 RX ORDER — AMMONIA INHALANTS 0.04 G/.3ML
0.3 INHALANT RESPIRATORY (INHALATION) AS NEEDED
Status: DISCONTINUED | OUTPATIENT
Start: 2020-05-18 | End: 2020-05-19

## 2020-05-18 RX ORDER — ACETAMINOPHEN 500 MG
500 TABLET ORAL EVERY 6 HOURS PRN
Status: DISCONTINUED | OUTPATIENT
Start: 2020-05-18 | End: 2020-05-18 | Stop reason: HOSPADM

## 2020-05-18 RX ORDER — DIAPER,BRIEF,INFANT-TODD,DISP
1 EACH MISCELLANEOUS EVERY 6 HOURS PRN
Status: DISCONTINUED | OUTPATIENT
Start: 2020-05-18 | End: 2020-05-19

## 2020-05-18 RX ORDER — ONDANSETRON 2 MG/ML
INJECTION INTRAMUSCULAR; INTRAVENOUS
Status: COMPLETED
Start: 2020-05-18 | End: 2020-05-18

## 2020-05-18 RX ORDER — SODIUM CHLORIDE, SODIUM LACTATE, POTASSIUM CHLORIDE, CALCIUM CHLORIDE 600; 310; 30; 20 MG/100ML; MG/100ML; MG/100ML; MG/100ML
INJECTION, SOLUTION INTRAVENOUS EVERY 8 HOURS
Status: DISCONTINUED | OUTPATIENT
Start: 2020-05-18 | End: 2020-05-18 | Stop reason: HOSPADM

## 2020-05-18 RX ORDER — IBUPROFEN 600 MG/1
600 TABLET ORAL EVERY 6 HOURS PRN
Status: DISCONTINUED | OUTPATIENT
Start: 2020-05-18 | End: 2020-05-19

## 2020-05-18 RX ORDER — BUPIVACAINE HYDROCHLORIDE 2.5 MG/ML
INJECTION, SOLUTION EPIDURAL; INFILTRATION; INTRACAUDAL
Status: COMPLETED | OUTPATIENT
Start: 2020-05-18 | End: 2020-05-18

## 2020-05-18 RX ORDER — LIDOCAINE HYDROCHLORIDE 10 MG/ML
INJECTION, SOLUTION INFILTRATION; PERINEURAL
Status: COMPLETED | OUTPATIENT
Start: 2020-05-18 | End: 2020-05-18

## 2020-05-18 RX ADMIN — LIDOCAINE HYDROCHLORIDE AND EPINEPHRINE 5 ML: 15; 5 INJECTION, SOLUTION EPIDURAL at 11:10:00

## 2020-05-18 RX ADMIN — BUPIVACAINE HYDROCHLORIDE 10 ML: 2.5 INJECTION, SOLUTION EPIDURAL; INFILTRATION; INTRACAUDAL at 11:10:00

## 2020-05-18 RX ADMIN — LIDOCAINE HYDROCHLORIDE 1 ML: 10 INJECTION, SOLUTION INFILTRATION; PERINEURAL at 11:10:00

## 2020-05-18 NOTE — PROGRESS NOTES
18:05    Rhogam administration:    Labs reviewed, consent obtained, information sheet reviewed with patient and Rhogam given via left hand IV. Pt tolerated well.      Haily Tyler, 05/18/20, 6:20 PM

## 2020-05-18 NOTE — ANESTHESIA PROCEDURE NOTES
Labor Analgesia  Date/Time: 5/18/2020 11:10 AM  Performed by: Ynes Dean MD  Authorized by: Ynes Dean MD       General Information and Staff    Start Time:  5/18/2020 11:05 AM  End Time:  5/18/2020 11:11 AM  Anesthesiologist:  Ynes Dean MD  P

## 2020-05-18 NOTE — ANESTHESIA POSTPROCEDURE EVALUATION
Patient: Ambar Christian    Procedure Summary     Date:  05/18/20 Room / Location:      Anesthesia Start:  2056 Anesthesia Stop:  3896    Procedure:  LABOR ANALGESIA Diagnosis:      Scheduled Providers:   Anesthesiologist:  Hair Llanos MD    Anesthesia Ty

## 2020-05-18 NOTE — PAYOR COMM NOTE
--------------  ADMISSION REVIEW     Payor: Alex Nayak #:  [id number]  Authorization Number: [id number]    Admit date: 5/18/20  Admit time: 7343       Admitting Physician: Rohith Miramontes MD  Attending Physician: History:   Procedure Laterality Date   • IMPLANTABLE BREAST PROSTHESIS  2018   •   ,    • OTHER SURGICAL HISTORY  2018    Tummy tuck and fat transfer to buttocks     Social History: Social History    Tobacco Use      Smoking status: Never Epidural Eunice Delgadillo MD      dextrose 5 % /lactated ringers infusion     Date Action Dose Route User    5/18/2020 1100 New Bag 125 mL/hr Intravenous Uri Morejon RN    5/18/2020 3054 New Bag (none) Intravenous Josselin Sun RN      EPHEDrine sulfa Paul Rock RN               Delivery      Infant  Date of Delivery: 2020   Time of Delivery: 2:06 PM  Delivery Type: Normal spontaneous vaginal delivery     Infant Sex  Information for the patient's : Jennifer Moleathas [[id number]]  male     Inf

## 2020-05-18 NOTE — DISCHARGE SUMMARY
[facility]    Discharge Summary    Leticia Juárez Patient Status:  Inpatient    8/15/1990 MRN [id number]   Location [address]  Attending Dinorah Harman MD   [facility] Day # 1       Delivering OB Clinician: Dr. Patrick Covarrubias

## 2020-05-18 NOTE — PROGRESS NOTES
Pt is a 34year old female admitted to TR4/TR4-A. Patient presents with:  Contractions: every 4 min     Pt is [id number] 40w0d intra-uterine pregnancy. History obtained, consents signed. Oriented to room, staff, and plan of care.

## 2020-05-18 NOTE — H&P
[address] Patient Status:  Inpatient    8/15/1990 MRN [id number]   Location [address] [place] [address] Attending Raúl Morgan MD   Hosp Day # 0 PCP Sydnie Marie DO     Date of Admi Take 1 capsule by mouth daily. , Disp: , Rfl: , 5/17/2020 at Unknown time        Review of Systems:   As documented in HPI      Physical Exam:   Temp:  [98 °F (36.7 °C)-98.2 °F (36.8 °C)] 98.2 °F (36.8 °C)  Pulse:  [] 83  Resp:  [16-18] 16  BP: (102-1

## 2020-05-18 NOTE — PLAN OF CARE
Vitals and assessment WNL. Breastfeeding infant. Voiding freely. Fundus firm; lochia small. Reviewed paperwork and new beginnings booklet including EPDS and birth certificate.      Problem: PAIN - ADULT  Goal: Verbalizes/displays adequate comfort level Provide/instruct/assist with pericare as needed. - Provide VTE prophylaxis as needed. - Monitor bowel function.  - Encourage ambulation and provide assistance as needed.   - Assess and monitor emotional status and provide social service/psych resources as handouts and information on community breast feeding support.    Outcome: Progressing  Goal: Establishment of adequate milk supply with medication/procedure interruptions  Description  INTERVENTIONS:  - Review techniques for milk expression (breast pumping)

## 2020-05-18 NOTE — ANESTHESIA PREPROCEDURE EVALUATION
Anesthesia PreOp Note    HPI:     Yamilka Carreno is a 34year old female who presents for preoperative consultation requested by: * No surgeons listed *    Date of Surgery: 5/18/2020    * No procedures listed *  Indication: * No pre-op diagnosis entered * NS premix infusion, 300 mL/hr, Intravenous, Continuous, Zaida Orellana MD  Terbutaline Sulfate (BRETHINE) 1 MG/ML injection 0.25 mg, 0.25 mg, Subcutaneous, PRN, Zaida Orellana MD  Sod Citrate-Citric Acid (BICITRA) solution 30 mL, 30 mL, Oral, PRN, Page on file      Years of education: Not on file      Highest education level: Not on file    Occupational History      Not on file    Social Needs      Financial resource strain: Not on file      Food insecurity:        Worry: Not on file        Inability: No 05/18/2020    MCH 23.4 (L) 05/18/2020    MCHC 32.0 05/18/2020    RDW 15.0 05/18/2020    .0 05/18/2020             Vital Signs: Body mass index is 32.24 kg/m². height is 1.6 m (5' 3\") and weight is 82.6 kg (182 lb).  Her oral temperature is 98.2 °

## 2020-05-18 NOTE — LACTATION NOTE
LACTATION NOTE - MOTHER      Evaluation Type: Inpatient    Problems identified  Problems identified: Knowledge deficit    Maternal history  Other/comment: breast augementation    Breastfeeding goal  Breastfeeding goal: To maintain breast milk feeding per p

## 2020-05-18 NOTE — PROGRESS NOTES
16:00    Admission Summary     Patient was received in room 366 via wheelchair. Bedside report received from Lisa Torrez RN. Patient assisted to bed with standby assist. Vitals and assessment WNL. Fundus firm, lochia small.  Bed in locked and lowest position

## 2020-05-18 NOTE — TRIAGE
[facility]      Triage Note    Bebeto Loud Patient Status:  Observation    8/15/1990 MRN [id number]   Location [address]  Attending Aleksandra Byrne MD   Hosp Day # 0 PCP DO Eliz Negro Para: visit: pt here for r/o labor, cervix remains unchanged, reactive tracing present. Pt discharged home with labor precautions      Leighann Khan RN  5/18/2020 4:15 AM    I have reviewed the NST and agree with the above findings and recommendations.

## 2020-05-18 NOTE — PROGRESS NOTES
Admit Time 0143  Patient admitted to triage 2 for evaluation  Pre admit information pt here for r/o labor

## 2020-05-18 NOTE — PROGRESS NOTES
Discharge Time 3987  Follow up with OB care provider this week  Instructions given on  Labor precautions

## 2020-05-18 NOTE — LACTATION NOTE
This note was copied from a baby's chart.   LACTATION NOTE - INFANT    Evaluation Type  Evaluation Type: Inpatient    Problems & Assessment  Infant Assessment: Minimal hunger cues present;Skin color: pink or appropriate for ethnicity  Muscle tone: [place]

## 2020-05-19 VITALS
WEIGHT: 182 LBS | SYSTOLIC BLOOD PRESSURE: 103 MMHG | HEIGHT: 63 IN | TEMPERATURE: 98 F | HEART RATE: 73 BPM | RESPIRATION RATE: 18 BRPM | OXYGEN SATURATION: 100 % | DIASTOLIC BLOOD PRESSURE: 59 MMHG | BODY MASS INDEX: 32.25 KG/M2

## 2020-05-19 RX ORDER — PSEUDOEPHEDRINE HCL 30 MG
100 TABLET ORAL 2 TIMES DAILY PRN
Qty: 30 CAPSULE | Refills: 1 | Status: SHIPPED | OUTPATIENT
Start: 2020-05-19 | End: 2020-06-30

## 2020-05-19 RX ORDER — IBUPROFEN 600 MG/1
600 TABLET ORAL EVERY 6 HOURS PRN
Qty: 30 TABLET | Refills: 0 | Status: SHIPPED | OUTPATIENT
Start: 2020-05-19 | End: 2020-10-15

## 2020-05-19 NOTE — PAYOR COMM NOTE
--------------  CONTINUED STAY REVIEW    Payor: Alex Nayak #:  [id number]  Authorization Number: [id number]    Admit date: 5/18/20  Admit time: 1807    Admitting Physician: Diana Richey MD  Attending Physician MEDICATIONS ADMINISTERED IN LAST 1 DAY:  acetaminophen (TYLENOL) tab 650 mg     Date Action Dose Route User    5/18/2020 1959 Given 650 mg Oral Josefina Nieves RN      bupivacaine PF (MARCAINE) 0.25% injection     Date Action Dose Route User Change 6 mL/hr Intravenous Agapito Boast, RN    5/18/2020 1230 Rate/Dose Change 4 mL/hr Intravenous Agapito Boast, RN    5/18/2020 1215 New Bag 2 luis-units/min Intravenous Agapito Boast, RN      Rho D Immune Globulin [facility]) injection 300 mcg

## 2020-05-19 NOTE — LACTATION NOTE
LACTATION NOTE - MOTHER      Evaluation Type: Inpatient    Problems identified  Problems identified: Knowledge deficit;Milk supply not WNL; Previous breast surgery  Milk supply not WNL: Reduced (potential)  Previous breast surgery: Augmentation(2018)

## 2020-05-21 ENCOUNTER — TELEPHONE (OUTPATIENT)
Dept: OBGYN UNIT | Facility: HOSPITAL | Age: 30
End: 2020-05-21

## 2020-05-23 RX ORDER — IBUPROFEN 600 MG/1
TABLET ORAL
Qty: 30 TABLET | Refills: 0 | OUTPATIENT
Start: 2020-05-23

## 2020-05-26 ENCOUNTER — TELEPHONE (OUTPATIENT)
Dept: OBGYN CLINIC | Facility: CLINIC | Age: 30
End: 2020-05-26

## 2020-05-26 NOTE — TELEPHONE ENCOUNTER
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

## 2020-05-26 NOTE — TELEPHONE ENCOUNTER
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 Pt has already been working since . Okay for note? PP do you want a phone call or can pt come in for visit?

## 2020-05-27 NOTE — TELEPHONE ENCOUNTER
OK for work return note from me but Dr Mandy Hanley will have to decide on visit type (phone vs office )

## 2020-05-27 NOTE — TELEPHONE ENCOUNTER
GISELLA pt delivered with you on 5/18/20. So do you want pt to have a pp visit in the office or virtual visit.

## 2020-05-27 NOTE — TELEPHONE ENCOUNTER
Pt calling for update on letter. Informed pt [employer] has not replied. Pt states she needs note today if possible. Informed pt message will be sent to GISELLA (on-call). Pt verbalized understanding. GISELLA- Pt delivered  on 2020 and needs letter for approval to work remotely as off 2020. Okay for letter?

## 2020-05-27 NOTE — TELEPHONE ENCOUNTER
Informed pt that GISELLA stated that she could have a letter to work from home on her computer as of 5/20/20. Informed pt that letter sent to her thru [address] [place] [address] [address].

## 2020-05-28 NOTE — TELEPHONE ENCOUNTER
Message to CSS for scheduling. CSS- please call pt and assist with scheduling PP appt in office with [employer] during PP window. Thank you!

## 2020-05-28 NOTE — TELEPHONE ENCOUNTER
No she did deliver with [address] [place], [place] [address] . He will decide virtual vs office visit.

## 2020-06-30 ENCOUNTER — POSTPARTUM (OUTPATIENT)
Dept: OBGYN CLINIC | Facility: CLINIC | Age: 30
End: 2020-06-30
Payer: MEDICAID

## 2020-06-30 VITALS
WEIGHT: 163 LBS | HEART RATE: 73 BPM | BODY MASS INDEX: 29 KG/M2 | SYSTOLIC BLOOD PRESSURE: 104 MMHG | DIASTOLIC BLOOD PRESSURE: 69 MMHG

## 2020-06-30 PROCEDURE — 0503F POSTPARTUM CARE VISIT: CPT | Performed by: OBSTETRICS & GYNECOLOGY

## 2020-06-30 RX ORDER — ACETAMINOPHEN AND CODEINE PHOSPHATE 120; 12 MG/5ML; MG/5ML
0.35 SOLUTION ORAL DAILY
Qty: 1 PACKAGE | Refills: 3 | Status: SHIPPED | OUTPATIENT
Start: 2020-06-30 | End: 2020-07-28

## 2020-07-05 PROBLEM — Z67.91 RH NEGATIVE STATUS DURING PREGNANCY: Status: RESOLVED | Noted: 2019-10-16 | Resolved: 2020-07-05

## 2020-07-05 PROBLEM — O99.891 ZIKA VIRUS EXPOSURE AFFECTING PREGNANCY: Status: RESOLVED | Noted: 2019-10-16 | Resolved: 2020-07-05

## 2020-07-05 PROBLEM — Z67.91 RH NEGATIVE STATUS DURING PREGNANCY (HCC): Status: RESOLVED | Noted: 2019-10-16 | Resolved: 2020-07-05

## 2020-07-05 PROBLEM — Z34.90 PREGNANT: Status: RESOLVED | Noted: 2020-05-18 | Resolved: 2020-07-05

## 2020-07-05 PROBLEM — Z34.90 PREGNANT (HCC): Status: RESOLVED | Noted: 2020-05-18 | Resolved: 2020-07-05

## 2020-07-05 PROBLEM — O26.899 RH NEGATIVE STATUS DURING PREGNANCY: Status: RESOLVED | Noted: 2019-10-16 | Resolved: 2020-07-05

## 2020-07-05 PROBLEM — Z20.821 ZIKA VIRUS EXPOSURE AFFECTING PREGNANCY: Status: RESOLVED | Noted: 2019-10-16 | Resolved: 2020-07-05

## 2020-07-05 PROBLEM — R87.612 LGSIL ON PAP SMEAR OF CERVIX: Status: RESOLVED | Noted: 2019-11-06 | Resolved: 2020-07-05

## 2020-07-05 PROBLEM — Z20.821 ZIKA VIRUS EXPOSURE AFFECTING PREGNANCY (HCC): Status: RESOLVED | Noted: 2019-10-16 | Resolved: 2020-07-05

## 2020-07-05 PROBLEM — O47.9 IRREGULAR CONTRACTIONS: Status: RESOLVED | Noted: 2020-05-18 | Resolved: 2020-07-05

## 2020-07-05 PROBLEM — O47.9 IRREGULAR CONTRACTIONS (HCC): Status: RESOLVED | Noted: 2020-05-18 | Resolved: 2020-07-05

## 2020-07-05 PROBLEM — Z37.9 NORMAL LABOR (HCC): Status: RESOLVED | Noted: 2020-05-18 | Resolved: 2020-07-05

## 2020-07-05 PROBLEM — O99.891 ZIKA VIRUS EXPOSURE AFFECTING PREGNANCY (HCC): Status: RESOLVED | Noted: 2019-10-16 | Resolved: 2020-07-05

## 2020-07-05 PROBLEM — Z37.9 NORMAL LABOR: Status: RESOLVED | Noted: 2020-05-18 | Resolved: 2020-07-05

## 2020-07-05 PROBLEM — O26.899 RH NEGATIVE STATUS DURING PREGNANCY (HCC): Status: RESOLVED | Noted: 2019-10-16 | Resolved: 2020-07-05

## 2020-07-05 NOTE — H&P
VIVIANA Victoria is a 34year old female [id number] here for 6 week post-partum visit. Patient delivered a  male infant on 20 via . Patient desires norqd for contraception. Patient is breast feeding.    Patient denies symptoms of depression, Ed (73.9 kg), last menstrual period 08/12/2019, currently breastfeeding.   General:  Well nourished, well developed woman in no acute distress  Abdomen:  soft, nontender, no masses  External Genitalia: normal appearance, hair distribution, and no lesions  Vagi

## 2020-10-15 ENCOUNTER — OFFICE VISIT (OUTPATIENT)
Dept: OBGYN CLINIC | Facility: CLINIC | Age: 30
End: 2020-10-15
Payer: MEDICAID

## 2020-10-15 VITALS
HEART RATE: 65 BPM | BODY MASS INDEX: 30.36 KG/M2 | HEIGHT: 62 IN | SYSTOLIC BLOOD PRESSURE: 101 MMHG | DIASTOLIC BLOOD PRESSURE: 64 MMHG | WEIGHT: 165 LBS

## 2020-10-15 DIAGNOSIS — Z01.419 WELL WOMAN EXAM: Primary | ICD-10-CM

## 2020-10-15 PROCEDURE — 3008F BODY MASS INDEX DOCD: CPT | Performed by: OBSTETRICS & GYNECOLOGY

## 2020-10-15 PROCEDURE — 3078F DIAST BP <80 MM HG: CPT | Performed by: OBSTETRICS & GYNECOLOGY

## 2020-10-15 PROCEDURE — 3074F SYST BP LT 130 MM HG: CPT | Performed by: OBSTETRICS & GYNECOLOGY

## 2020-10-15 PROCEDURE — 99395 PREV VISIT EST AGE 18-39: CPT | Performed by: OBSTETRICS & GYNECOLOGY

## 2020-10-15 NOTE — H&P
HPI:  The patient is a 26 yo F here for WWE. NO GYN c/o. Monthly cycles. Condoms for IC. Currently breastfeeding.      LPS: 10/16/19 LSIL colpo during pregnancy 11/6/19     Reviewed medical and surgical history below       OBSTETRICS HISTORY:  OB Hist Emotionally abused: Not on file        Physically abused: Not on file        Forced sexual activity: Not on file    Other Topics      Concerns:         Service: Not Asked        Blood Transfusions: Not Asked        Caffeine Concern:  Yes supraclavicular or axillary adenopathy is noted  Breast: normal without palpable masses, tenderness, asymmetry, nipple discharge, nipple retraction or skin changes; bilateral implants.  +breastmilk bilaterally  Abdomen:  soft, nontender, nondistended, no ma

## 2020-10-20 ENCOUNTER — LAB ENCOUNTER (OUTPATIENT)
Dept: LAB | Age: 30
End: 2020-10-20
Attending: FAMILY MEDICINE
Payer: MEDICAID

## 2020-10-20 ENCOUNTER — TELEMEDICINE (OUTPATIENT)
Dept: FAMILY MEDICINE CLINIC | Facility: CLINIC | Age: 30
End: 2020-10-20
Payer: MEDICAID

## 2020-10-20 ENCOUNTER — NURSE TRIAGE (OUTPATIENT)
Dept: FAMILY MEDICINE CLINIC | Facility: CLINIC | Age: 30
End: 2020-10-20

## 2020-10-20 DIAGNOSIS — R30.0 DYSURIA: Primary | ICD-10-CM

## 2020-10-20 DIAGNOSIS — R30.0 DYSURIA: ICD-10-CM

## 2020-10-20 DIAGNOSIS — R39.15 URINARY URGENCY: ICD-10-CM

## 2020-10-20 DIAGNOSIS — N89.8 VAGINAL DISCHARGE: ICD-10-CM

## 2020-10-20 PROCEDURE — 99213 OFFICE O/P EST LOW 20 MIN: CPT | Performed by: FAMILY MEDICINE

## 2020-10-20 PROCEDURE — 87086 URINE CULTURE/COLONY COUNT: CPT

## 2020-10-20 PROCEDURE — 87077 CULTURE AEROBIC IDENTIFY: CPT

## 2020-10-20 PROCEDURE — 81001 URINALYSIS AUTO W/SCOPE: CPT

## 2020-10-20 PROCEDURE — 87186 SC STD MICRODIL/AGAR DIL: CPT

## 2020-10-20 RX ORDER — NITROFURANTOIN 25; 75 MG/1; MG/1
100 CAPSULE ORAL 2 TIMES DAILY
Qty: 10 CAPSULE | Refills: 0 | Status: SHIPPED | OUTPATIENT
Start: 2020-10-20 | End: 2020-10-25

## 2020-10-20 NOTE — PROGRESS NOTES
TELEPHONE VISIT PROGRESS NOTE  Todays date: 10/20/2020 2:59 PM        Most recent Nurse Triage message / [facility] [address] message from patient:      Homero Lopez RN   Registered Nurse   Specialty:  Registered Nurse   Telephone Encounter   Signed   Encounter me is talking to the patient. This also goes for family members who would rather speak to me on behalf of their Korean-speaking (or another foreign language) patient.   The patient will give consent but I will be speaking to the person that would be tra Improving []   Worsening  [x]   Unchanged  []     Waxing/Waning  []  N/A  []            Physical Exam:   Limited examination due to this being a phone visit       Patient was speaking in complete sentences, no increased work of breathing and very coherent Grandmother    • Cancer Paternal Grandfather         per [employer]:  \"? ?Cancer? ? (cause of death)\"   • Cancer Maternal Uncle         Cancer, stomach       Reviewed Social History:  Social History    Tobacco Use      Smoking status: Never Smoker      Smokel documentation. All medical record entries made by the scribe were at my direction and in my presence.   I have reviewed the chart and discharge instructions (if applicable) and agree that the record reflects my personal performance and is accurate and compl

## 2020-11-03 ENCOUNTER — TELEMEDICINE (OUTPATIENT)
Dept: FAMILY MEDICINE CLINIC | Facility: CLINIC | Age: 30
End: 2020-11-03
Payer: MEDICAID

## 2020-11-03 ENCOUNTER — NURSE TRIAGE (OUTPATIENT)
Dept: FAMILY MEDICINE CLINIC | Facility: CLINIC | Age: 30
End: 2020-11-03

## 2020-11-03 DIAGNOSIS — Z20.822 ENCOUNTER BY TELEHEALTH FOR SUSPECTED COVID-19: Primary | ICD-10-CM

## 2020-11-03 PROCEDURE — 99213 OFFICE O/P EST LOW 20 MIN: CPT | Performed by: NURSE PRACTITIONER

## 2020-11-04 ENCOUNTER — APPOINTMENT (OUTPATIENT)
Dept: LAB | Age: 30
End: 2020-11-04
Attending: NURSE PRACTITIONER
Payer: MEDICAID

## 2020-11-04 DIAGNOSIS — Z20.822 ENCOUNTER BY TELEHEALTH FOR SUSPECTED COVID-19: ICD-10-CM

## 2020-11-04 NOTE — PROGRESS NOTES
HPI    Virtual Telephone/Video Doximity Check-In    Leticia Juárez verbally consents to a Virtual/Telephone Check-In visit on 11/03/20.   Patient has been referred to the [facility] website at [email or web address] to review the yearly Consent to Treat document Uncle         Cancer, stomach       Social History    Socioeconomic History      Marital status: Single      Spouse name: Not on file      Number of children: Not on file      Years of education: Not on file      Highest education level: Not on file    Occ Medications   Medication Sig Dispense Refill   • prenatal multivitamin plus DHA 27-0.8-228 MG Oral Cap Take 1 capsule by mouth daily. Allergies:  No Known Allergies    Physical Exam   Nursing note reviewed.    Constitutional: She is oriented to Jared Hussein

## 2020-11-04 NOTE — PATIENT INSTRUCTIONS
Coronavirus Disease 2019 (COVID-19): Overview  Coronavirus disease 2019 (COVID-19) is a respiratory illness. It's caused by a new (novel) coronavirus. There are many types of coronavirus. Coronaviruses are a very common cause of colds and bronchitis.  The What are possible complications from [id number]? In many cases, this virus can cause infection (pneumonia) in both lungs. In some cases, this can cause death. Certain people are at higher risk for complications.  This includes older adults and people with se Your healthcare provider will ask about your symptoms. He or she will ask where you live, and about your recent travel, and any contact with sick people.  If your healthcare provider thinks you may have COVID-19, he or she will consider whether to test you At this time, it's unclear if people can be reinfected with COVID-19.  The CDC notes that if a person has fully recovered from COVID-19 and is retested within 3 months of the first infection, they may continue to have low levels of the virus in their body a · Prone positioning. Depending on how sick you are during your hospital stay, your healthcare team may turn you regularly on your stomach. This is called prone positioning. It helps increase the amount of oxygen you get to your lungs.  Follow your healthca

## 2021-04-22 ENCOUNTER — OFFICE VISIT (OUTPATIENT)
Dept: OBGYN CLINIC | Facility: CLINIC | Age: 31
End: 2021-04-22
Payer: MEDICAID

## 2021-04-22 VITALS
HEART RATE: 70 BPM | WEIGHT: 163 LBS | DIASTOLIC BLOOD PRESSURE: 71 MMHG | SYSTOLIC BLOOD PRESSURE: 112 MMHG | BODY MASS INDEX: 30 KG/M2

## 2021-04-22 DIAGNOSIS — N63.10 LUMP OF RIGHT BREAST: Primary | ICD-10-CM

## 2021-04-22 PROCEDURE — 99213 OFFICE O/P EST LOW 20 MIN: CPT | Performed by: OBSTETRICS & GYNECOLOGY

## 2021-04-22 PROCEDURE — 3078F DIAST BP <80 MM HG: CPT | Performed by: OBSTETRICS & GYNECOLOGY

## 2021-04-22 PROCEDURE — 3074F SYST BP LT 130 MM HG: CPT | Performed by: OBSTETRICS & GYNECOLOGY

## 2021-04-22 NOTE — H&P
HPI:  The patient is a 28 yo F presents c/o right breast lump. Stopped BF 5 months ago and for the last month has noticed a non tender non growing lesion in right breast.  No nipple dc. NO pain. NO beast CA hx. Has implants.       LPS: 10/15/20 pap/hpv Paying Living Expenses:   Food Insecurity:       Worried About [address] [employer] [employer] in the Last Year:       Ran Out of Food in the Last Year:   Transportation Needs:       Lack of Transportation (Medical):       Lack of Transportation (Non-Medical):   Physi supraclavicular or axillary adenopathy is noted  Breast: b/l implants noted; right breast at 3 oclock position along the scarred nipple line is a small pencil tip sized firm non tender no mobile lesion.   ? Scar tissue vs fold in implant vs. Fibroadenoma  P

## 2021-04-29 ENCOUNTER — HOSPITAL ENCOUNTER (OUTPATIENT)
Dept: ULTRASOUND IMAGING | Facility: HOSPITAL | Age: 31
Discharge: HOME | End: 2021-04-29
Attending: OBSTETRICS & GYNECOLOGY
Payer: MEDICAID

## 2021-04-29 ENCOUNTER — HOSPITAL ENCOUNTER (OUTPATIENT)
Dept: MAMMOGRAPHY | Facility: HOSPITAL | Age: 31
Discharge: HOME | End: 2021-04-29
Attending: OBSTETRICS & GYNECOLOGY
Payer: MEDICAID

## 2021-04-29 ENCOUNTER — TELEPHONE (OUTPATIENT)
Dept: OBGYN CLINIC | Facility: CLINIC | Age: 31
End: 2021-04-29

## 2021-04-29 DIAGNOSIS — N63.10 LUMP OF RIGHT BREAST: ICD-10-CM

## 2021-04-29 PROCEDURE — 77062 BREAST TOMOSYNTHESIS BI: CPT | Performed by: OBSTETRICS & GYNECOLOGY

## 2021-04-29 PROCEDURE — 76642 ULTRASOUND BREAST LIMITED: CPT | Performed by: OBSTETRICS & GYNECOLOGY

## 2021-04-29 PROCEDURE — 77066 DX MAMMO INCL CAD BI: CPT | Performed by: OBSTETRICS & GYNECOLOGY

## 2021-04-29 NOTE — TELEPHONE ENCOUNTER
Received fax from [facility] dated 4/29/21 to contact [place] at [phone] for approval needed for US breast right limited [id number] dx code N63.10- lump in right breast. Spoke with Marylen [place] case # [id number] transferred me to approval line.  Dominique Buckner

## 2021-07-12 ENCOUNTER — NURSE TRIAGE (OUTPATIENT)
Dept: FAMILY MEDICINE CLINIC | Facility: CLINIC | Age: 31
End: 2021-07-12

## 2021-07-12 NOTE — TELEPHONE ENCOUNTER
Action Requested: Summary for Provider     []  Critical Lab, Recommendations Needed  [] Need Additional Advice  []   FYI    []   Need Orders  [] Need Medications Sent to Pharmacy  []  Other     SUMMARY:  Patient states for the last month she has been exper

## 2021-07-13 ENCOUNTER — TELEPHONE (OUTPATIENT)
Dept: FAMILY MEDICINE CLINIC | Facility: CLINIC | Age: 31
End: 2021-07-13

## 2021-07-13 NOTE — PATIENT INSTRUCTIONS
Treating Anxiety Disorders with Medicine  An anxiety disorder can make you feel nervous or apprehensive, even without a clear reason.  In people age 72 and older, generalized anxiety disorder is one of the most commonly diagnosed anxiety disorders. Many t only the amount of medicine prescribed for you. If you think you may have taken too much, get emergency care right away. Never share your medicines with others. Store these medicines in a safe place that can't be reached by children or visitors.    Keep lisset of addiction, you may not be able to use certain medicines used to treat anxiety disorders. · Medicine interactions. Always check with your pharmacist before using any over-the-counter medicines (OTCs), including herbal supplements.  Some OTCs may interact help you better deal with anxiety. Other forms of therapy  Other therapy methods may work better for you than CBT. Or, you may move from CBT to another form of therapy as your treatment needs change.  This may mean meeting with a therapist by yourself or i try to find ways to reduce it. · Avoid caffeine and nicotine. These can make anxiety symptoms worse. · Don't turn to alcohol or unprescribed medicines for relief. They only make things worse in the long run.   Korin last reviewed this educational douglas

## 2021-07-13 NOTE — PROGRESS NOTES
HPI    Patient presents for concerns of stress and anxiety that has been present for a long time but worse over the past few weeks. Under a lot of stress lately at home. Denies suicidal and homicidal ideation.       Review of Systems   Psychiatric/Behavio Asked        Caffeine Concern: Yes          Coffee, 1 cup daily        Occupational Exposure: Not Asked        Hobby Hazards: Not Asked        Sleep Concern: Not Asked        Stress Concern: Not Asked        Weight Concern: Not Asked        Special Diet: N ill-appearing, toxic-appearing or diaphoretic. HENT:      Head: Normocephalic and atraumatic. Cardiovascular:      Rate and Rhythm: Normal rate and regular rhythm. Heart sounds: Normal heart sounds. No murmur heard.      Pulmonary:      Effort: Pul

## 2021-07-13 NOTE — TELEPHONE ENCOUNTER
Patient is requesting a letter stating she is going under depression and anxiety. Patient is requesting letter for husbands immigration case. Patient requesting letter from pcp.

## 2021-08-19 ENCOUNTER — TELEPHONE (OUTPATIENT)
Dept: OBGYN CLINIC | Facility: CLINIC | Age: 31
End: 2021-08-19

## 2021-08-19 NOTE — TELEPHONE ENCOUNTER
Pt confirms +HPT and LMP 7/1. Not yet taking PNV. OK with seeing all MDs as she has delivered with us before. OBN made. Pt prefers PC. Will start PNV today.

## 2021-08-23 ENCOUNTER — NURSE ONLY (OUTPATIENT)
Dept: OBGYN CLINIC | Facility: CLINIC | Age: 31
End: 2021-08-23
Payer: MEDICAID

## 2021-08-23 DIAGNOSIS — Z34.81 ENCOUNTER FOR SUPERVISION OF OTHER NORMAL PREGNANCY IN FIRST TRIMESTER: Primary | ICD-10-CM

## 2021-08-23 PROCEDURE — 99211 OFF/OP EST MAY X REQ PHY/QHP: CPT | Performed by: OBSTETRICS & GYNECOLOGY

## 2021-08-23 NOTE — PROGRESS NOTES
Pt seen for OBN appt today with no complaints. Normal PN labs ordered. Pt advised all labs must be completed and resulted prior to MD appt.   Pt walked to  to schedule NPN appt with MD.    Amber Lorenzo name is Tesfaye Crespo contact #[phone]; ra ([facility], [facility], [address] [facility], or  background):  MCV less than 80: No   Neural tube defect (Meningomyelocele, Spina bifida, or Anencephaly): No   Congenital heart defect: No   Down syndrome: No   Tino-Sachs ([address], [place]): N

## 2021-09-01 ENCOUNTER — LAB ENCOUNTER (OUTPATIENT)
Dept: LAB | Age: 31
End: 2021-09-01
Attending: OBSTETRICS & GYNECOLOGY
Payer: MEDICAID

## 2021-09-01 DIAGNOSIS — Z34.81 ENCOUNTER FOR SUPERVISION OF OTHER NORMAL PREGNANCY IN FIRST TRIMESTER: ICD-10-CM

## 2021-09-01 LAB
ANTIBODY SCREEN: NEGATIVE
BASOPHILS # BLD AUTO: 0.03 X10(3) UL (ref 0–0.2)
BASOPHILS NFR BLD AUTO: 0.4 %
DEPRECATED RDW RBC AUTO: 39.5 FL (ref 35.1–46.3)
EOSINOPHIL # BLD AUTO: 0.19 X10(3) UL (ref 0–0.7)
EOSINOPHIL NFR BLD AUTO: 2.6 %
ERYTHROCYTE [DISTWIDTH] IN BLOOD BY AUTOMATED COUNT: 12.5 % (ref 11–15)
HBV SURFACE AG SER-ACNC: <0.1 [IU]/L
HBV SURFACE AG SERPL QL IA: NONREACTIVE
HCG SERPL QL: POSITIVE
HCT VFR BLD AUTO: 41.5 %
HGB BLD-MCNC: 14.1 G/DL
IMM GRANULOCYTES # BLD AUTO: 0.03 X10(3) UL (ref 0–1)
IMM GRANULOCYTES NFR BLD: 0.4 %
LYMPHOCYTES # BLD AUTO: 1.96 X10(3) UL (ref 1–4)
LYMPHOCYTES NFR BLD AUTO: 26.3 %
MCH RBC QN AUTO: 29.3 PG (ref 26–34)
MCHC RBC AUTO-ENTMCNC: 34 G/DL (ref 31–37)
MCV RBC AUTO: 86.3 FL
MONOCYTES # BLD AUTO: 0.57 X10(3) UL (ref 0.1–1)
MONOCYTES NFR BLD AUTO: 7.7 %
NEUTROPHILS # BLD AUTO: 4.67 X10 (3) UL (ref 1.5–7.7)
NEUTROPHILS # BLD AUTO: 4.67 X10(3) UL (ref 1.5–7.7)
NEUTROPHILS NFR BLD AUTO: 62.6 %
PLATELET # BLD AUTO: 223 10(3)UL (ref 150–450)
RBC # BLD AUTO: 4.81 X10(6)UL
RH BLOOD TYPE: NEGATIVE
RUBV IGG SER QL: POSITIVE
RUBV IGG SER-ACNC: 55.7 IU/ML (ref 10–?)
WBC # BLD AUTO: 7.5 X10(3) UL (ref 4–11)

## 2021-09-01 PROCEDURE — 86850 RBC ANTIBODY SCREEN: CPT

## 2021-09-01 PROCEDURE — 85025 COMPLETE CBC W/AUTO DIFF WBC: CPT

## 2021-09-01 PROCEDURE — 86901 BLOOD TYPING SEROLOGIC RH(D): CPT

## 2021-09-01 PROCEDURE — 87086 URINE CULTURE/COLONY COUNT: CPT

## 2021-09-01 PROCEDURE — 87340 HEPATITIS B SURFACE AG IA: CPT

## 2021-09-01 PROCEDURE — 86780 TREPONEMA PALLIDUM: CPT

## 2021-09-01 PROCEDURE — 87389 HIV-1 AG W/HIV-1&-2 AB AG IA: CPT

## 2021-09-01 PROCEDURE — 86900 BLOOD TYPING SEROLOGIC ABO: CPT

## 2021-09-01 PROCEDURE — 36415 COLL VENOUS BLD VENIPUNCTURE: CPT

## 2021-09-01 PROCEDURE — 86762 RUBELLA ANTIBODY: CPT

## 2021-09-01 PROCEDURE — 84703 CHORIONIC GONADOTROPIN ASSAY: CPT

## 2021-09-03 LAB — T PALLIDUM AB SER QL: NEGATIVE

## 2021-09-07 ENCOUNTER — INITIAL PRENATAL (OUTPATIENT)
Dept: OBGYN CLINIC | Facility: CLINIC | Age: 31
End: 2021-09-07
Payer: MEDICAID

## 2021-09-07 VITALS
DIASTOLIC BLOOD PRESSURE: 74 MMHG | WEIGHT: 172 LBS | HEART RATE: 80 BPM | BODY MASS INDEX: 31 KG/M2 | SYSTOLIC BLOOD PRESSURE: 119 MMHG

## 2021-09-07 DIAGNOSIS — Z34.91 ENCOUNTER FOR SUPERVISION OF NORMAL PREGNANCY IN FIRST TRIMESTER, UNSPECIFIED GRAVIDITY: Primary | ICD-10-CM

## 2021-09-07 LAB
APPEARANCE: CLEAR
BILIRUBIN: NEGATIVE
GLUCOSE (URINE DIPSTICK): NEGATIVE MG/DL
KETONES (URINE DIPSTICK): NEGATIVE MG/DL
LEUKOCYTES: NEGATIVE
MULTISTIX LOT#: NORMAL NUMERIC
NITRITE, URINE: NEGATIVE
OCCULT BLOOD: NEGATIVE
PH, URINE: 6.5 (ref 4.5–8)
PROTEIN (URINE DIPSTICK): NEGATIVE MG/DL
SPECIFIC GRAVITY: 1.01 (ref 1–1.03)
URINE-COLOR: YELLOW
UROBILINOGEN,SEMI-QN: 0.2 MG/DL (ref 0–1.9)

## 2021-09-07 PROCEDURE — 3074F SYST BP LT 130 MM HG: CPT | Performed by: OBSTETRICS & GYNECOLOGY

## 2021-09-07 PROCEDURE — 0500F INITIAL PRENATAL CARE VISIT: CPT | Performed by: OBSTETRICS & GYNECOLOGY

## 2021-09-07 PROCEDURE — 81002 URINALYSIS NONAUTO W/O SCOPE: CPT | Performed by: OBSTETRICS & GYNECOLOGY

## 2021-09-07 PROCEDURE — 3078F DIAST BP <80 MM HG: CPT | Performed by: OBSTETRICS & GYNECOLOGY

## 2021-09-07 NOTE — PROGRESS NOTES
Declined genetics. Last pap 10/2020. Gc/chlamydia/trichomonas. Bedside ultrasound--+FHT, c/w dates.   RTC 4 wk

## 2021-09-08 PROBLEM — O26.899 RH NEGATIVE STATE IN ANTEPARTUM PERIOD: Status: ACTIVE | Noted: 2019-10-16

## 2021-09-08 PROBLEM — Z67.91 RH NEGATIVE STATE IN ANTEPARTUM PERIOD: Status: ACTIVE | Noted: 2019-10-16

## 2021-09-08 PROBLEM — Z67.91 RH NEGATIVE STATE IN ANTEPARTUM PERIOD (HCC): Status: ACTIVE | Noted: 2019-10-16

## 2021-09-08 PROBLEM — R53.83 LETHARGY: Status: RESOLVED | Noted: 2017-09-19 | Resolved: 2021-09-08

## 2021-09-08 PROBLEM — O26.899 RH NEGATIVE STATE IN ANTEPARTUM PERIOD (HCC): Status: ACTIVE | Noted: 2019-10-16

## 2021-09-08 LAB
C TRACH DNA SPEC QL NAA+PROBE: NEGATIVE
N GONORRHOEA DNA SPEC QL NAA+PROBE: NEGATIVE
T VAGINALIS RRNA SPEC QL NAA+PROBE: NEGATIVE

## 2021-09-11 ENCOUNTER — TELEPHONE (OUTPATIENT)
Dept: OBGYN CLINIC | Facility: CLINIC | Age: 31
End: 2021-09-11

## 2021-09-11 ENCOUNTER — APPOINTMENT (OUTPATIENT)
Dept: ULTRASOUND IMAGING | Facility: HOSPITAL | Age: 31
End: 2021-09-11
Attending: EMERGENCY MEDICINE
Payer: MEDICAID

## 2021-09-11 ENCOUNTER — HOSPITAL ENCOUNTER (EMERGENCY)
Facility: HOSPITAL | Age: 31
Discharge: HOME | End: 2021-09-11
Attending: EMERGENCY MEDICINE
Payer: MEDICAID

## 2021-09-11 VITALS
TEMPERATURE: 98 F | SYSTOLIC BLOOD PRESSURE: 107 MMHG | RESPIRATION RATE: 18 BRPM | WEIGHT: 170 LBS | OXYGEN SATURATION: 98 % | BODY MASS INDEX: 30.12 KG/M2 | HEIGHT: 63 IN | HEART RATE: 69 BPM | DIASTOLIC BLOOD PRESSURE: 68 MMHG

## 2021-09-11 DIAGNOSIS — N30.00 ACUTE CYSTITIS WITHOUT HEMATURIA: ICD-10-CM

## 2021-09-11 DIAGNOSIS — Z67.91 RH NEGATIVE STATUS DURING PREGNANCY IN FIRST TRIMESTER: ICD-10-CM

## 2021-09-11 DIAGNOSIS — O41.8X10 SUBCHORIONIC HEMORRHAGE OF PLACENTA IN FIRST TRIMESTER, SINGLE OR UNSPECIFIED FETUS: Primary | ICD-10-CM

## 2021-09-11 DIAGNOSIS — O26.891 RH NEGATIVE STATUS DURING PREGNANCY IN FIRST TRIMESTER: ICD-10-CM

## 2021-09-11 DIAGNOSIS — O20.9 BLEEDING IN EARLY PREGNANCY: ICD-10-CM

## 2021-09-11 DIAGNOSIS — O46.8X1 SUBCHORIONIC HEMORRHAGE OF PLACENTA IN FIRST TRIMESTER, SINGLE OR UNSPECIFIED FETUS: Primary | ICD-10-CM

## 2021-09-11 LAB
ANION GAP SERPL CALC-SCNC: 6 MMOL/L (ref 0–18)
ANTIBODY SCREEN: NEGATIVE
B-HCG SERPL-ACNC: ABNORMAL MIU/ML
B-HCG UR QL: POSITIVE
BASOPHILS # BLD AUTO: 0.03 X10(3) UL (ref 0–0.2)
BASOPHILS NFR BLD AUTO: 0.3 %
BILIRUB UR QL: NEGATIVE
BUN BLD-MCNC: 10 MG/DL (ref 7–18)
BUN/CREAT SERPL: 16.9 (ref 10–20)
CALCIUM BLD-MCNC: 8.8 MG/DL (ref 8.5–10.1)
CHLORIDE SERPL-SCNC: 107 MMOL/L (ref 98–112)
CO2 SERPL-SCNC: 25 MMOL/L (ref 21–32)
COLOR UR: YELLOW
CREAT BLD-MCNC: 0.59 MG/DL
DEPRECATED RDW RBC AUTO: 38.6 FL (ref 35.1–46.3)
EOSINOPHIL # BLD AUTO: 0.22 X10(3) UL (ref 0–0.7)
EOSINOPHIL NFR BLD AUTO: 2.5 %
ERYTHROCYTE [DISTWIDTH] IN BLOOD BY AUTOMATED COUNT: 12.4 % (ref 11–15)
GLUCOSE BLD-MCNC: 85 MG/DL (ref 70–99)
GLUCOSE UR-MCNC: NEGATIVE MG/DL
HCT VFR BLD AUTO: 39.8 %
HGB BLD-MCNC: 13.3 G/DL
IMM GRANULOCYTES # BLD AUTO: 0.03 X10(3) UL (ref 0–1)
IMM GRANULOCYTES NFR BLD: 0.3 %
KETONES UR-MCNC: NEGATIVE MG/DL
LYMPHOCYTES # BLD AUTO: 2.03 X10(3) UL (ref 1–4)
LYMPHOCYTES NFR BLD AUTO: 23.5 %
MCH RBC QN AUTO: 28.5 PG (ref 26–34)
MCHC RBC AUTO-ENTMCNC: 33.4 G/DL (ref 31–37)
MCV RBC AUTO: 85.4 FL
MONOCYTES # BLD AUTO: 0.98 X10(3) UL (ref 0.1–1)
MONOCYTES NFR BLD AUTO: 11.4 %
NEUTROPHILS # BLD AUTO: 5.34 X10 (3) UL (ref 1.5–7.7)
NEUTROPHILS # BLD AUTO: 5.34 X10(3) UL (ref 1.5–7.7)
NEUTROPHILS NFR BLD AUTO: 62 %
NITRITE UR QL STRIP.AUTO: NEGATIVE
OSMOLALITY SERPL CALC.SUM OF ELEC: 284 MOSM/KG (ref 275–295)
PH UR: 7 [PH] (ref 5–8)
PLATELET # BLD AUTO: 228 10(3)UL (ref 150–450)
POTASSIUM SERPL-SCNC: 3.7 MMOL/L (ref 3.5–5.1)
PROT UR-MCNC: NEGATIVE MG/DL
RBC # BLD AUTO: 4.66 X10(6)UL
RH BLOOD TYPE: NEGATIVE
SODIUM SERPL-SCNC: 138 MMOL/L (ref 136–145)
SP GR UR STRIP: 1.02 (ref 1–1.03)
UROBILINOGEN UR STRIP-ACNC: <2
WBC # BLD AUTO: 8.6 X10(3) UL (ref 4–11)

## 2021-09-11 PROCEDURE — 81001 URINALYSIS AUTO W/SCOPE: CPT | Performed by: EMERGENCY MEDICINE

## 2021-09-11 PROCEDURE — 85025 COMPLETE CBC W/AUTO DIFF WBC: CPT | Performed by: EMERGENCY MEDICINE

## 2021-09-11 PROCEDURE — 96361 HYDRATE IV INFUSION ADD-ON: CPT

## 2021-09-11 PROCEDURE — 84702 CHORIONIC GONADOTROPIN TEST: CPT | Performed by: EMERGENCY MEDICINE

## 2021-09-11 PROCEDURE — 96374 THER/PROPH/DIAG INJ IV PUSH: CPT

## 2021-09-11 PROCEDURE — 86850 RBC ANTIBODY SCREEN: CPT | Performed by: EMERGENCY MEDICINE

## 2021-09-11 PROCEDURE — 80048 BASIC METABOLIC PNL TOTAL CA: CPT | Performed by: EMERGENCY MEDICINE

## 2021-09-11 PROCEDURE — 99284 EMERGENCY DEPT VISIT MOD MDM: CPT

## 2021-09-11 PROCEDURE — 76817 TRANSVAGINAL US OBSTETRIC: CPT | Performed by: EMERGENCY MEDICINE

## 2021-09-11 PROCEDURE — 81025 URINE PREGNANCY TEST: CPT

## 2021-09-11 PROCEDURE — 76801 OB US < 14 WKS SINGLE FETUS: CPT | Performed by: EMERGENCY MEDICINE

## 2021-09-11 PROCEDURE — 86900 BLOOD TYPING SEROLOGIC ABO: CPT | Performed by: EMERGENCY MEDICINE

## 2021-09-11 PROCEDURE — 86901 BLOOD TYPING SEROLOGIC RH(D): CPT | Performed by: EMERGENCY MEDICINE

## 2021-09-11 RX ORDER — CEPHALEXIN 500 MG/1
500 CAPSULE ORAL 2 TIMES DAILY
Qty: 14 CAPSULE | Refills: 0 | Status: SHIPPED | OUTPATIENT
Start: 2021-09-11 | End: 2021-09-18

## 2021-09-11 NOTE — ED INITIAL ASSESSMENT (HPI)
The patient who reports that she is 10 weeks pregnant complains of vaginal bleeding/spotting that started this morning. The patient denies any pain.

## 2021-09-11 NOTE — TELEPHONE ENCOUNTER
Patient states is 10 weeks pregnant and is O- blood and is having some spotting today. No cramping.      Please advise

## 2021-09-11 NOTE — TELEPHONE ENCOUNTER
Spoke with TRACEY (on call). Mitchell Pop St stated that since pt had NEW OB on 9/7 and Monday will be within her 72 hour rhogam window, she can be seen for exam on Monday if she wishes. Pt offered appt for Monday. Pt stated she is already on her way to the ER and would prefer to be seen today, so will go to ER and call Monday for f/u.

## 2021-09-11 NOTE — TELEPHONE ENCOUNTER
Pt 10w2d calling to report that she just to the bathroom and noticed bright red blood with wiping. Pt denies any abdominal pain/crampigng. Pt denies recent IC or straining with BM. Pt is O negative and would need rhogam. Pt informed that there are no openings for her to be seen today, so it would be best for her to go to ER for evaluation and rhogam injection. Pt verbalized understanding. Pt advised to call Monday for ER f/u. Message to [facility] [address] (on call) for sign off.

## 2021-09-13 ENCOUNTER — TELEPHONE (OUTPATIENT)
Dept: OBGYN CLINIC | Facility: CLINIC | Age: 31
End: 2021-09-13

## 2021-09-13 NOTE — TELEPHONE ENCOUNTER
Patient states her dentist extracted a tooth and wants to know if she can take amoxicillin or penicillin.

## 2021-09-13 NOTE — TELEPHONE ENCOUNTER
Informed pt that Amoxicillin is safe to take in pregnancies. Pt wanted to know what she can take for pain. Informed pt that she can take Tylenol. Pt stated understanding.

## 2021-09-14 NOTE — ED PROVIDER NOTES
Patient Seen in: [facility] Emergency Department      History   Patient presents with:  Pregnancy Issues  Vaginal Bleeding    Stated Complaint: Vaginal bleeding 11 weeks preg    Subjective:   HPI    32year old female approx 8 wga who presents wi Effort: Pulmonary effort is normal. No respiratory distress. Breath sounds: Normal breath sounds. No wheezing. Abdominal:      General: There is no distension. Palpations: Abdomen is soft. Tenderness: There is no abdominal tenderness.  Suzanna Banerjee -----------         ------                     CBC W/ DIFFERENTIAL[[id number]]                              Final result                 Please view results for these tests on the individual orders. TYPE AND SCREEN    Narrative:      The following orders w first trimester, single or unspecified fetus  (primary encounter diagnosis)  Bleeding in early pregnancy  Rh negative status during pregnancy in first trimester  Acute cystitis without hematuria     Disposition:  Discharge  9/11/2021  4:37 pm    Follow-up:

## 2021-10-05 ENCOUNTER — ROUTINE PRENATAL (OUTPATIENT)
Dept: OBGYN CLINIC | Facility: CLINIC | Age: 31
End: 2021-10-05
Payer: MEDICAID

## 2021-10-05 VITALS
DIASTOLIC BLOOD PRESSURE: 74 MMHG | BODY MASS INDEX: 30 KG/M2 | SYSTOLIC BLOOD PRESSURE: 115 MMHG | WEIGHT: 170 LBS | HEART RATE: 78 BPM

## 2021-10-05 DIAGNOSIS — Z34.92 ENCOUNTER FOR SUPERVISION OF NORMAL PREGNANCY IN SECOND TRIMESTER, UNSPECIFIED GRAVIDITY: Primary | ICD-10-CM

## 2021-10-05 PROCEDURE — 0502F SUBSEQUENT PRENATAL CARE: CPT | Performed by: OBSTETRICS & GYNECOLOGY

## 2021-10-05 PROCEDURE — 3078F DIAST BP <80 MM HG: CPT | Performed by: OBSTETRICS & GYNECOLOGY

## 2021-10-05 PROCEDURE — 81002 URINALYSIS NONAUTO W/O SCOPE: CPT | Performed by: OBSTETRICS & GYNECOLOGY

## 2021-10-05 PROCEDURE — 3074F SYST BP LT 130 MM HG: CPT | Performed by: OBSTETRICS & GYNECOLOGY

## 2021-11-03 ENCOUNTER — ROUTINE PRENATAL (OUTPATIENT)
Dept: OBGYN CLINIC | Facility: CLINIC | Age: 31
End: 2021-11-03
Payer: MEDICAID

## 2021-11-03 VITALS
WEIGHT: 174.81 LBS | HEART RATE: 73 BPM | SYSTOLIC BLOOD PRESSURE: 109 MMHG | BODY MASS INDEX: 31 KG/M2 | DIASTOLIC BLOOD PRESSURE: 71 MMHG

## 2021-11-03 DIAGNOSIS — Z34.82 ENCOUNTER FOR SUPERVISION OF OTHER NORMAL PREGNANCY IN SECOND TRIMESTER: Primary | ICD-10-CM

## 2021-11-03 PROCEDURE — 0502F SUBSEQUENT PRENATAL CARE: CPT | Performed by: OBSTETRICS & GYNECOLOGY

## 2021-11-03 PROCEDURE — 3078F DIAST BP <80 MM HG: CPT | Performed by: OBSTETRICS & GYNECOLOGY

## 2021-11-03 PROCEDURE — 3074F SYST BP LT 130 MM HG: CPT | Performed by: OBSTETRICS & GYNECOLOGY

## 2021-11-03 PROCEDURE — 81002 URINALYSIS NONAUTO W/O SCOPE: CPT | Performed by: OBSTETRICS & GYNECOLOGY

## 2021-11-04 NOTE — PROGRESS NOTES
Hafsa Rosalba is well. Level 1 is scheduled. C/O ? Clitoral DC which would not be likely. Exam shows normal EG with only mucoid DC vaginally and at introitus. Os closed. Emil Likes reassured and guidance given.

## 2021-11-05 ENCOUNTER — TELEPHONE (OUTPATIENT)
Dept: OBGYN CLINIC | Facility: CLINIC | Age: 31
End: 2021-11-05

## 2021-11-05 NOTE — TELEPHONE ENCOUNTER
Thi Alejo, ins verf calling for prior auth for [place] [address], through [place], phone [phone],FTMS.   *U.S scheduled Monday

## 2021-11-08 ENCOUNTER — HOSPITAL ENCOUNTER (OUTPATIENT)
Dept: ULTRASOUND IMAGING | Facility: HOSPITAL | Age: 31
Discharge: HOME | End: 2021-11-08
Attending: OBSTETRICS & GYNECOLOGY
Payer: MEDICAID

## 2021-11-08 DIAGNOSIS — Z34.92 ENCOUNTER FOR SUPERVISION OF NORMAL PREGNANCY IN SECOND TRIMESTER, UNSPECIFIED GRAVIDITY: ICD-10-CM

## 2021-11-08 PROCEDURE — 76805 OB US >/= 14 WKS SNGL FETUS: CPT | Performed by: OBSTETRICS & GYNECOLOGY

## 2021-11-30 ENCOUNTER — ROUTINE PRENATAL (OUTPATIENT)
Dept: OBGYN CLINIC | Facility: CLINIC | Age: 31
End: 2021-11-30
Payer: MEDICAID

## 2021-11-30 VITALS
BODY MASS INDEX: 31 KG/M2 | SYSTOLIC BLOOD PRESSURE: 112 MMHG | WEIGHT: 177.63 LBS | HEART RATE: 79 BPM | DIASTOLIC BLOOD PRESSURE: 72 MMHG

## 2021-11-30 DIAGNOSIS — Z34.91 ENCOUNTER FOR SUPERVISION OF NORMAL PREGNANCY IN FIRST TRIMESTER, UNSPECIFIED GRAVIDITY: Primary | ICD-10-CM

## 2021-11-30 PROCEDURE — 3078F DIAST BP <80 MM HG: CPT | Performed by: OBSTETRICS & GYNECOLOGY

## 2021-11-30 PROCEDURE — 90686 IIV4 VACC NO PRSV 0.5 ML IM: CPT | Performed by: OBSTETRICS & GYNECOLOGY

## 2021-11-30 PROCEDURE — 0502F SUBSEQUENT PRENATAL CARE: CPT | Performed by: OBSTETRICS & GYNECOLOGY

## 2021-11-30 PROCEDURE — 3074F SYST BP LT 130 MM HG: CPT | Performed by: OBSTETRICS & GYNECOLOGY

## 2021-11-30 PROCEDURE — 81002 URINALYSIS NONAUTO W/O SCOPE: CPT | Performed by: OBSTETRICS & GYNECOLOGY

## 2021-11-30 PROCEDURE — 90471 IMMUNIZATION ADMIN: CPT | Performed by: OBSTETRICS & GYNECOLOGY

## 2021-12-29 ENCOUNTER — ROUTINE PRENATAL (OUTPATIENT)
Dept: OBGYN CLINIC | Facility: CLINIC | Age: 31
End: 2021-12-29
Payer: MEDICAID

## 2021-12-29 ENCOUNTER — TELEPHONE (OUTPATIENT)
Dept: OBGYN CLINIC | Facility: CLINIC | Age: 31
End: 2021-12-29

## 2021-12-29 VITALS
BODY MASS INDEX: 32 KG/M2 | DIASTOLIC BLOOD PRESSURE: 68 MMHG | HEART RATE: 82 BPM | SYSTOLIC BLOOD PRESSURE: 103 MMHG | WEIGHT: 178.63 LBS

## 2021-12-29 DIAGNOSIS — Z34.91 ENCOUNTER FOR SUPERVISION OF NORMAL PREGNANCY IN FIRST TRIMESTER, UNSPECIFIED GRAVIDITY: Primary | ICD-10-CM

## 2021-12-29 PROCEDURE — 3074F SYST BP LT 130 MM HG: CPT | Performed by: OBSTETRICS & GYNECOLOGY

## 2021-12-29 PROCEDURE — 3078F DIAST BP <80 MM HG: CPT | Performed by: OBSTETRICS & GYNECOLOGY

## 2021-12-29 PROCEDURE — 0502F SUBSEQUENT PRENATAL CARE: CPT | Performed by: OBSTETRICS & GYNECOLOGY

## 2021-12-29 PROCEDURE — 81002 URINALYSIS NONAUTO W/O SCOPE: CPT | Performed by: OBSTETRICS & GYNECOLOGY

## 2021-12-29 NOTE — TELEPHONE ENCOUNTER
Patient needs to scheudle her 28 week ob appointment for the week of 1/10/22. There are currently no openings that week as of right now.  Thank you

## 2021-12-29 NOTE — PROGRESS NOTES
C/o RLP- rec hydration,heating pads, tylenol prn, c/o rash on her neck near her mask but spreading downward- change mask and topical steroid prn, also antihistamines if still itching

## 2021-12-29 NOTE — TELEPHONE ENCOUNTER
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   Pt booked on 1/10 with PAUL at 5pm.

## 2022-01-10 ENCOUNTER — ROUTINE PRENATAL (OUTPATIENT)
Dept: OBGYN CLINIC | Facility: CLINIC | Age: 32
End: 2022-01-10
Payer: MEDICAID

## 2022-01-10 ENCOUNTER — LAB ENCOUNTER (OUTPATIENT)
Dept: LAB | Facility: HOSPITAL | Age: 32
End: 2022-01-10
Attending: CLINICAL NURSE SPECIALIST
Payer: MEDICAID

## 2022-01-10 VITALS
WEIGHT: 180.19 LBS | BODY MASS INDEX: 32 KG/M2 | DIASTOLIC BLOOD PRESSURE: 70 MMHG | SYSTOLIC BLOOD PRESSURE: 111 MMHG | HEART RATE: 87 BPM

## 2022-01-10 DIAGNOSIS — Z34.82 ENCOUNTER FOR SUPERVISION OF OTHER NORMAL PREGNANCY IN SECOND TRIMESTER: Primary | ICD-10-CM

## 2022-01-10 DIAGNOSIS — Z34.82 ENCOUNTER FOR SUPERVISION OF OTHER NORMAL PREGNANCY IN SECOND TRIMESTER: ICD-10-CM

## 2022-01-10 LAB
ANTIBODY SCREEN: NEGATIVE
APPEARANCE: CLEAR
BILIRUBIN: NEGATIVE
GLUCOSE (URINE DIPSTICK): NEGATIVE MG/DL
KETONES (URINE DIPSTICK): NEGATIVE MG/DL
LEUKOCYTES: NEGATIVE
MULTISTIX LOT#: NORMAL NUMERIC
NITRITE, URINE: NEGATIVE
OCCULT BLOOD: NEGATIVE
PH, URINE: 6.5 (ref 4.5–8)
PROTEIN (URINE DIPSTICK): NEGATIVE MG/DL
RH BLOOD TYPE: NEGATIVE
SPECIFIC GRAVITY: 1.02 (ref 1–1.03)
URINE-COLOR: YELLOW
UROBILINOGEN,SEMI-QN: 0.2 MG/DL (ref 0–1.9)

## 2022-01-10 PROCEDURE — 86850 RBC ANTIBODY SCREEN: CPT

## 2022-01-10 PROCEDURE — 36415 COLL VENOUS BLD VENIPUNCTURE: CPT

## 2022-01-10 PROCEDURE — 81002 URINALYSIS NONAUTO W/O SCOPE: CPT | Performed by: CLINICAL NURSE SPECIALIST

## 2022-01-10 PROCEDURE — 86900 BLOOD TYPING SEROLOGIC ABO: CPT

## 2022-01-10 PROCEDURE — 3078F DIAST BP <80 MM HG: CPT | Performed by: CLINICAL NURSE SPECIALIST

## 2022-01-10 PROCEDURE — 0502F SUBSEQUENT PRENATAL CARE: CPT | Performed by: CLINICAL NURSE SPECIALIST

## 2022-01-10 PROCEDURE — 3074F SYST BP LT 130 MM HG: CPT | Performed by: CLINICAL NURSE SPECIALIST

## 2022-01-10 PROCEDURE — 86901 BLOOD TYPING SEROLOGIC RH(D): CPT

## 2022-01-11 NOTE — PROGRESS NOTES
Doing well. Denies any vaginal bleeding, LOF, or cramping. + FM reported. Is scheduled for 2nd trimester labs on Sat 1/15. Will have blood type and AB screen done tonight and will see RN on Saturday 1/15 as well for Rhogam injection. RTO in 2 weeks.

## 2022-01-13 ENCOUNTER — TELEPHONE (OUTPATIENT)
Dept: OBGYN CLINIC | Facility: CLINIC | Age: 32
End: 2022-01-13

## 2022-01-13 NOTE — TELEPHONE ENCOUNTER
----- Message from OMAR York sent at 1/12/2022 10:04 AM CST -----  Please let pt know she is all set for RN appt on Saturday for Rhogam injection.     MAF

## 2022-01-13 NOTE — TELEPHONE ENCOUNTER
Message to Zarina Cleverjacky to please advise. Is it okay for pt to come in for Rhogam before 2T labs are resulted?

## 2022-01-15 ENCOUNTER — NURSE ONLY (OUTPATIENT)
Dept: OBGYN CLINIC | Facility: CLINIC | Age: 32
End: 2022-01-15
Payer: MEDICAID

## 2022-01-15 ENCOUNTER — LAB ENCOUNTER (OUTPATIENT)
Dept: LAB | Age: 32
End: 2022-01-15
Attending: OBSTETRICS & GYNECOLOGY
Payer: MEDICAID

## 2022-01-15 DIAGNOSIS — O26.899 RH NEGATIVE STATE IN ANTEPARTUM PERIOD: ICD-10-CM

## 2022-01-15 DIAGNOSIS — Z67.91 RH NEGATIVE STATE IN ANTEPARTUM PERIOD: ICD-10-CM

## 2022-01-15 DIAGNOSIS — Z34.82 ENCOUNTER FOR SUPERVISION OF OTHER NORMAL PREGNANCY IN SECOND TRIMESTER: Primary | ICD-10-CM

## 2022-01-15 LAB
DEPRECATED RDW RBC AUTO: 37.7 FL (ref 35.1–46.3)
ERYTHROCYTE [DISTWIDTH] IN BLOOD BY AUTOMATED COUNT: 12 % (ref 11–15)
GLUCOSE 1H P GLC SERPL-MCNC: 122 MG/DL
HCT VFR BLD AUTO: 38.5 %
HGB BLD-MCNC: 12.3 G/DL
MCH RBC QN AUTO: 27.4 PG (ref 26–34)
MCHC RBC AUTO-ENTMCNC: 31.9 G/DL (ref 31–37)
MCV RBC AUTO: 85.7 FL
PLATELET # BLD AUTO: 245 10(3)UL (ref 150–450)
RBC # BLD AUTO: 4.49 X10(6)UL
WBC # BLD AUTO: 11.2 X10(3) UL (ref 4–11)

## 2022-01-15 PROCEDURE — 36415 COLL VENOUS BLD VENIPUNCTURE: CPT | Performed by: OBSTETRICS & GYNECOLOGY

## 2022-01-15 PROCEDURE — 82950 GLUCOSE TEST: CPT | Performed by: OBSTETRICS & GYNECOLOGY

## 2022-01-15 PROCEDURE — 96372 THER/PROPH/DIAG INJ SC/IM: CPT | Performed by: CLINICAL NURSE SPECIALIST

## 2022-01-15 PROCEDURE — 85027 COMPLETE CBC AUTOMATED: CPT | Performed by: OBSTETRICS & GYNECOLOGY

## 2022-01-15 NOTE — PROGRESS NOTES
Prenatal patient in today for Rhogam injection. Patient is 28w2d today. Injection given right deltoid. Patient tolerated well. Rhogam information sheet provided. Patient instructed to follow up with routine PN visits.  Patient instructed to contact office w

## 2022-01-27 ENCOUNTER — ROUTINE PRENATAL (OUTPATIENT)
Dept: OBGYN CLINIC | Facility: CLINIC | Age: 32
End: 2022-01-27
Payer: MEDICAID

## 2022-01-27 VITALS
HEART RATE: 94 BPM | WEIGHT: 180 LBS | DIASTOLIC BLOOD PRESSURE: 73 MMHG | BODY MASS INDEX: 32 KG/M2 | SYSTOLIC BLOOD PRESSURE: 112 MMHG

## 2022-01-27 DIAGNOSIS — Z34.83 ENCOUNTER FOR SUPERVISION OF OTHER NORMAL PREGNANCY IN THIRD TRIMESTER: Primary | ICD-10-CM

## 2022-01-27 LAB
BILIRUBIN: NEGATIVE
GLUCOSE (URINE DIPSTICK): NEGATIVE MG/DL
KETONES (URINE DIPSTICK): NEGATIVE MG/DL
MULTISTIX EXPIRATION DATE: ABNORMAL DATE
MULTISTIX LOT#: 1027 NUMERIC
NITRITE, URINE: NEGATIVE
OCCULT BLOOD: NEGATIVE
PH, URINE: 7 (ref 4.5–8)
SPECIFIC GRAVITY: 1.02 (ref 1–1.03)
UROBILINOGEN,SEMI-QN: 0.2 MG/DL (ref 0–1.9)

## 2022-01-27 PROCEDURE — 3078F DIAST BP <80 MM HG: CPT | Performed by: OBSTETRICS & GYNECOLOGY

## 2022-01-27 PROCEDURE — 0502F SUBSEQUENT PRENATAL CARE: CPT | Performed by: OBSTETRICS & GYNECOLOGY

## 2022-01-27 PROCEDURE — 3074F SYST BP LT 130 MM HG: CPT | Performed by: OBSTETRICS & GYNECOLOGY

## 2022-01-27 PROCEDURE — 81002 URINALYSIS NONAUTO W/O SCOPE: CPT | Performed by: OBSTETRICS & GYNECOLOGY

## 2022-02-08 ENCOUNTER — PATIENT MESSAGE (OUTPATIENT)
Dept: FAMILY MEDICINE CLINIC | Facility: CLINIC | Age: 32
End: 2022-02-08

## 2022-02-08 NOTE — TELEPHONE ENCOUNTER
From: Carri Tucker  To: Venus Putnam DO  Sent: 2/8/2022 8:38 AM CST  Subject: Immunization Records    Good morning,    I was trying to get my immunization records through my chart my I am not able to get them all together. Would I be able to get my immunization record please? I need them for school.  Thank you!!!

## 2022-02-16 ENCOUNTER — ROUTINE PRENATAL (OUTPATIENT)
Dept: OBGYN CLINIC | Facility: CLINIC | Age: 32
End: 2022-02-16
Payer: MEDICAID

## 2022-02-16 VITALS
SYSTOLIC BLOOD PRESSURE: 114 MMHG | DIASTOLIC BLOOD PRESSURE: 71 MMHG | HEART RATE: 81 BPM | WEIGHT: 184 LBS | BODY MASS INDEX: 33 KG/M2

## 2022-02-16 DIAGNOSIS — Z34.83 ENCOUNTER FOR SUPERVISION OF OTHER NORMAL PREGNANCY IN THIRD TRIMESTER: Primary | ICD-10-CM

## 2022-02-16 DIAGNOSIS — O99.213 OBESITY COMPLICATING PREGNANCY, THIRD TRIMESTER: ICD-10-CM

## 2022-02-16 LAB
APPEARANCE: CLEAR
BILIRUBIN: NEGATIVE
GLUCOSE (URINE DIPSTICK): NEGATIVE MG/DL
KETONES (URINE DIPSTICK): NEGATIVE MG/DL
LEUKOCYTES: NEGATIVE
MULTISTIX LOT#: 1027 NUMERIC
NITRITE, URINE: NEGATIVE
OCCULT BLOOD: NEGATIVE
PH, URINE: 7 (ref 4.5–8)
PROTEIN (URINE DIPSTICK): NEGATIVE MG/DL
SPECIFIC GRAVITY: 1.01 (ref 1–1.03)
URINE-COLOR: YELLOW
UROBILINOGEN,SEMI-QN: 0.2 MG/DL (ref 0–1.9)

## 2022-02-16 PROCEDURE — 3074F SYST BP LT 130 MM HG: CPT | Performed by: OBSTETRICS & GYNECOLOGY

## 2022-02-16 PROCEDURE — 3078F DIAST BP <80 MM HG: CPT | Performed by: OBSTETRICS & GYNECOLOGY

## 2022-02-16 PROCEDURE — 0502F SUBSEQUENT PRENATAL CARE: CPT | Performed by: OBSTETRICS & GYNECOLOGY

## 2022-02-16 PROCEDURE — 81002 URINALYSIS NONAUTO W/O SCOPE: CPT | Performed by: OBSTETRICS & GYNECOLOGY

## 2022-02-25 ENCOUNTER — TELEPHONE (OUTPATIENT)
Dept: OBGYN CLINIC | Facility: CLINIC | Age: 32
End: 2022-02-25

## 2022-02-25 NOTE — TELEPHONE ENCOUNTER
[place] verification needs prior authorization for [facility] Ultrasound CPT 59949  [facility] [phone]

## 2022-02-28 ENCOUNTER — HOSPITAL ENCOUNTER (OUTPATIENT)
Dept: ULTRASOUND IMAGING | Facility: HOSPITAL | Age: 32
Discharge: HOME | End: 2022-02-28
Attending: OBSTETRICS & GYNECOLOGY
Payer: MEDICAID

## 2022-02-28 DIAGNOSIS — O99.213 OBESITY COMPLICATING PREGNANCY, THIRD TRIMESTER: ICD-10-CM

## 2022-02-28 PROCEDURE — 76816 OB US FOLLOW-UP PER FETUS: CPT | Performed by: OBSTETRICS & GYNECOLOGY

## 2022-03-03 ENCOUNTER — ROUTINE PRENATAL (OUTPATIENT)
Dept: OBGYN CLINIC | Facility: CLINIC | Age: 32
End: 2022-03-03
Payer: MEDICAID

## 2022-03-03 ENCOUNTER — LAB ENCOUNTER (OUTPATIENT)
Dept: LAB | Facility: HOSPITAL | Age: 32
End: 2022-03-03
Attending: OBSTETRICS & GYNECOLOGY
Payer: MEDICAID

## 2022-03-03 VITALS
SYSTOLIC BLOOD PRESSURE: 115 MMHG | DIASTOLIC BLOOD PRESSURE: 77 MMHG | BODY MASS INDEX: 33 KG/M2 | WEIGHT: 186.38 LBS | HEART RATE: 85 BPM

## 2022-03-03 DIAGNOSIS — Z34.80 ENCOUNTER FOR SUPERVISION OF OTHER NORMAL PREGNANCY, UNSPECIFIED TRIMESTER: Primary | ICD-10-CM

## 2022-03-03 LAB
BILIRUBIN: NEGATIVE
DEPRECATED RDW RBC AUTO: 36.3 FL (ref 35.1–46.3)
ERYTHROCYTE [DISTWIDTH] IN BLOOD BY AUTOMATED COUNT: 12.5 % (ref 11–15)
GLUCOSE (URINE DIPSTICK): NEGATIVE MG/DL
HCT VFR BLD AUTO: 34.8 %
HGB BLD-MCNC: 11.1 G/DL
KETONES (URINE DIPSTICK): NEGATIVE MG/DL
LEUKOCYTES: NEGATIVE
MCH RBC QN AUTO: 25.6 PG (ref 26–34)
MCHC RBC AUTO-ENTMCNC: 31.9 G/DL (ref 31–37)
MCV RBC AUTO: 80.4 FL
MULTISTIX LOT#: NORMAL NUMERIC
NITRITE, URINE: NEGATIVE
OCCULT BLOOD: NEGATIVE
PH, URINE: 6 (ref 4.5–8)
PLATELET # BLD AUTO: 244 10(3)UL (ref 150–450)
PROTEIN (URINE DIPSTICK): NEGATIVE MG/DL
RBC # BLD AUTO: 4.33 X10(6)UL
SPECIFIC GRAVITY: 1.02 (ref 1–1.03)
UROBILINOGEN,SEMI-QN: 0.2 MG/DL (ref 0–1.9)
WBC # BLD AUTO: 10.4 X10(3) UL (ref 4–11)

## 2022-03-03 PROCEDURE — 0502F SUBSEQUENT PRENATAL CARE: CPT | Performed by: OBSTETRICS & GYNECOLOGY

## 2022-03-03 PROCEDURE — 3078F DIAST BP <80 MM HG: CPT | Performed by: OBSTETRICS & GYNECOLOGY

## 2022-03-03 PROCEDURE — 86780 TREPONEMA PALLIDUM: CPT | Performed by: OBSTETRICS & GYNECOLOGY

## 2022-03-03 PROCEDURE — 36415 COLL VENOUS BLD VENIPUNCTURE: CPT | Performed by: OBSTETRICS & GYNECOLOGY

## 2022-03-03 PROCEDURE — 87389 HIV-1 AG W/HIV-1&-2 AB AG IA: CPT | Performed by: OBSTETRICS & GYNECOLOGY

## 2022-03-03 PROCEDURE — 85027 COMPLETE CBC AUTOMATED: CPT | Performed by: OBSTETRICS & GYNECOLOGY

## 2022-03-03 PROCEDURE — 3074F SYST BP LT 130 MM HG: CPT | Performed by: OBSTETRICS & GYNECOLOGY

## 2022-03-03 PROCEDURE — 81002 URINALYSIS NONAUTO W/O SCOPE: CPT | Performed by: OBSTETRICS & GYNECOLOGY

## 2022-03-04 LAB — T PALLIDUM AB SER QL: NEGATIVE

## 2022-03-09 ENCOUNTER — ROUTINE PRENATAL (OUTPATIENT)
Dept: OBGYN CLINIC | Facility: CLINIC | Age: 32
End: 2022-03-09
Payer: MEDICAID

## 2022-03-09 VITALS
SYSTOLIC BLOOD PRESSURE: 113 MMHG | DIASTOLIC BLOOD PRESSURE: 69 MMHG | HEART RATE: 88 BPM | BODY MASS INDEX: 33 KG/M2 | WEIGHT: 186 LBS

## 2022-03-09 DIAGNOSIS — Z34.83 ENCOUNTER FOR SUPERVISION OF OTHER NORMAL PREGNANCY IN THIRD TRIMESTER: Primary | ICD-10-CM

## 2022-03-09 LAB
APPEARANCE: CLEAR
GLUCOSE (URINE DIPSTICK): NEGATIVE MG/DL
KETONES (URINE DIPSTICK): NEGATIVE MG/DL
MULTISTIX LOT#: NORMAL NUMERIC
NITRITE, URINE: NEGATIVE
PROTEIN (URINE DIPSTICK): NEGATIVE MG/DL
URINE-COLOR: YELLOW

## 2022-03-09 PROCEDURE — 3074F SYST BP LT 130 MM HG: CPT | Performed by: OBSTETRICS & GYNECOLOGY

## 2022-03-09 PROCEDURE — 3078F DIAST BP <80 MM HG: CPT | Performed by: OBSTETRICS & GYNECOLOGY

## 2022-03-09 PROCEDURE — 0502F SUBSEQUENT PRENATAL CARE: CPT | Performed by: OBSTETRICS & GYNECOLOGY

## 2022-03-09 PROCEDURE — 81002 URINALYSIS NONAUTO W/O SCOPE: CPT | Performed by: OBSTETRICS & GYNECOLOGY

## 2022-03-17 ENCOUNTER — ROUTINE PRENATAL (OUTPATIENT)
Dept: OBGYN CLINIC | Facility: CLINIC | Age: 32
End: 2022-03-17
Payer: MEDICAID

## 2022-03-17 VITALS
HEART RATE: 81 BPM | SYSTOLIC BLOOD PRESSURE: 127 MMHG | DIASTOLIC BLOOD PRESSURE: 76 MMHG | BODY MASS INDEX: 33 KG/M2 | WEIGHT: 186.38 LBS

## 2022-03-17 DIAGNOSIS — Z34.83 ENCOUNTER FOR SUPERVISION OF OTHER NORMAL PREGNANCY IN THIRD TRIMESTER: Primary | ICD-10-CM

## 2022-03-17 LAB
BILIRUBIN: NEGATIVE
GLUCOSE (URINE DIPSTICK): NEGATIVE MG/DL
KETONES (URINE DIPSTICK): NEGATIVE MG/DL
MULTISTIX EXPIRATION DATE: NORMAL DATE
MULTISTIX LOT#: 1023 NUMERIC
NITRITE, URINE: NEGATIVE
OCCULT BLOOD: NEGATIVE
PH, URINE: 6.5 (ref 4.5–8)
PROTEIN (URINE DIPSTICK): NEGATIVE MG/DL
SPECIFIC GRAVITY: 1.02 (ref 1–1.03)
UROBILINOGEN,SEMI-QN: 0.2 MG/DL (ref 0–1.9)

## 2022-03-17 PROCEDURE — 3074F SYST BP LT 130 MM HG: CPT | Performed by: OBSTETRICS & GYNECOLOGY

## 2022-03-17 PROCEDURE — 3078F DIAST BP <80 MM HG: CPT | Performed by: OBSTETRICS & GYNECOLOGY

## 2022-03-17 PROCEDURE — 0502F SUBSEQUENT PRENATAL CARE: CPT | Performed by: OBSTETRICS & GYNECOLOGY

## 2022-03-17 PROCEDURE — 81002 URINALYSIS NONAUTO W/O SCOPE: CPT | Performed by: OBSTETRICS & GYNECOLOGY

## 2022-03-20 LAB — GROUP B STREP BY PCR FOR PCR OVT: NEGATIVE

## 2022-03-26 ENCOUNTER — ROUTINE PRENATAL (OUTPATIENT)
Dept: OBGYN CLINIC | Facility: CLINIC | Age: 32
End: 2022-03-26
Payer: MEDICAID

## 2022-03-26 VITALS
WEIGHT: 188.81 LBS | SYSTOLIC BLOOD PRESSURE: 120 MMHG | DIASTOLIC BLOOD PRESSURE: 71 MMHG | HEART RATE: 82 BPM | BODY MASS INDEX: 33 KG/M2

## 2022-03-26 DIAGNOSIS — Z34.80 ENCOUNTER FOR SUPERVISION OF OTHER NORMAL PREGNANCY, UNSPECIFIED TRIMESTER: Primary | ICD-10-CM

## 2022-03-26 LAB
APPEARANCE: CLEAR
BILIRUBIN: NEGATIVE
GLUCOSE (URINE DIPSTICK): NEGATIVE MG/DL
KETONES (URINE DIPSTICK): NEGATIVE MG/DL
LEUKOCYTES: NEGATIVE
MULTISTIX LOT#: NORMAL NUMERIC
NITRITE, URINE: NEGATIVE
OCCULT BLOOD: NEGATIVE
PH, URINE: 7.5 (ref 4.5–8)
PROTEIN (URINE DIPSTICK): NEGATIVE MG/DL
SPECIFIC GRAVITY: 1.01 (ref 1–1.03)
URINE-COLOR: YELLOW
UROBILINOGEN,SEMI-QN: 0.2 MG/DL (ref 0–1.9)

## 2022-03-26 PROCEDURE — 3078F DIAST BP <80 MM HG: CPT | Performed by: OBSTETRICS & GYNECOLOGY

## 2022-03-26 PROCEDURE — 3074F SYST BP LT 130 MM HG: CPT | Performed by: OBSTETRICS & GYNECOLOGY

## 2022-03-26 PROCEDURE — 81002 URINALYSIS NONAUTO W/O SCOPE: CPT | Performed by: OBSTETRICS & GYNECOLOGY

## 2022-03-26 PROCEDURE — 0502F SUBSEQUENT PRENATAL CARE: CPT | Performed by: OBSTETRICS & GYNECOLOGY

## 2022-03-29 ENCOUNTER — ROUTINE PRENATAL (OUTPATIENT)
Dept: OBGYN CLINIC | Facility: CLINIC | Age: 32
End: 2022-03-29
Payer: MEDICAID

## 2022-03-29 VITALS
DIASTOLIC BLOOD PRESSURE: 78 MMHG | WEIGHT: 189 LBS | SYSTOLIC BLOOD PRESSURE: 121 MMHG | HEART RATE: 91 BPM | BODY MASS INDEX: 33 KG/M2

## 2022-03-29 DIAGNOSIS — Z34.83 ENCOUNTER FOR SUPERVISION OF OTHER NORMAL PREGNANCY IN THIRD TRIMESTER: Primary | ICD-10-CM

## 2022-03-29 LAB
BILIRUBIN: NEGATIVE
GLUCOSE (URINE DIPSTICK): NEGATIVE MG/DL
KETONES (URINE DIPSTICK): NEGATIVE MG/DL
LEUKOCYTES: NEGATIVE
MULTISTIX EXPIRATION DATE: NORMAL DATE
MULTISTIX LOT#: 1027 NUMERIC
NITRITE, URINE: NEGATIVE
OCCULT BLOOD: NEGATIVE
PH, URINE: 6.5 (ref 4.5–8)
SPECIFIC GRAVITY: 1.01 (ref 1–1.03)
UROBILINOGEN,SEMI-QN: 0.2 MG/DL (ref 0–1.9)

## 2022-03-29 PROCEDURE — 3078F DIAST BP <80 MM HG: CPT | Performed by: OBSTETRICS & GYNECOLOGY

## 2022-03-29 PROCEDURE — 3074F SYST BP LT 130 MM HG: CPT | Performed by: OBSTETRICS & GYNECOLOGY

## 2022-03-29 PROCEDURE — 81002 URINALYSIS NONAUTO W/O SCOPE: CPT | Performed by: OBSTETRICS & GYNECOLOGY

## 2022-03-29 PROCEDURE — 0502F SUBSEQUENT PRENATAL CARE: CPT | Performed by: OBSTETRICS & GYNECOLOGY

## 2022-04-05 ENCOUNTER — HOSPITAL ENCOUNTER (INPATIENT)
Facility: HOSPITAL | Age: 32
LOS: 1 days | Discharge: HOME | End: 2022-04-06
Attending: OBSTETRICS & GYNECOLOGY | Admitting: OBSTETRICS & GYNECOLOGY
Payer: MEDICAID

## 2022-04-05 VITALS
SYSTOLIC BLOOD PRESSURE: 120 MMHG | RESPIRATION RATE: 18 BRPM | HEART RATE: 107 BPM | DIASTOLIC BLOOD PRESSURE: 80 MMHG | TEMPERATURE: 98 F

## 2022-04-06 ENCOUNTER — ANESTHESIA (OUTPATIENT)
Dept: OBGYN UNIT | Facility: HOSPITAL | Age: 32
End: 2022-04-06
Payer: MEDICAID

## 2022-04-06 ENCOUNTER — HOSPITAL ENCOUNTER (INPATIENT)
Facility: HOSPITAL | Age: 32
LOS: 1 days | Discharge: HOME | End: 2022-04-07
Attending: OBSTETRICS & GYNECOLOGY | Admitting: OBSTETRICS & GYNECOLOGY
Payer: MEDICAID

## 2022-04-06 ENCOUNTER — ANESTHESIA EVENT (OUTPATIENT)
Dept: OBGYN UNIT | Facility: HOSPITAL | Age: 32
End: 2022-04-06
Payer: MEDICAID

## 2022-04-06 PROBLEM — Z37.9 NORMAL LABOR (HCC): Status: ACTIVE | Noted: 2022-04-06

## 2022-04-06 PROBLEM — Z37.9 NORMAL LABOR: Status: ACTIVE | Noted: 2022-04-06

## 2022-04-06 LAB
ANTIBODY SCREEN: NEGATIVE
BASOPHILS # BLD AUTO: 0.02 X10(3) UL (ref 0–0.2)
BASOPHILS NFR BLD AUTO: 0.2 %
DEPRECATED RDW RBC AUTO: 38.9 FL (ref 35.1–46.3)
EOSINOPHIL # BLD AUTO: 0.09 X10(3) UL (ref 0–0.7)
EOSINOPHIL NFR BLD AUTO: 0.8 %
ERYTHROCYTE [DISTWIDTH] IN BLOOD BY AUTOMATED COUNT: 14 % (ref 11–15)
FETAL SCREEN RESULT: NEGATIVE
HCT VFR BLD AUTO: 36.9 %
HGB BLD-MCNC: 11.3 G/DL
IMM GRANULOCYTES # BLD AUTO: 0.1 X10(3) UL (ref 0–1)
IMM GRANULOCYTES NFR BLD: 0.8 %
LYMPHOCYTES # BLD AUTO: 2.24 X10(3) UL (ref 1–4)
LYMPHOCYTES NFR BLD AUTO: 19 %
MCH RBC QN AUTO: 23.5 PG (ref 26–34)
MCHC RBC AUTO-ENTMCNC: 30.6 G/DL (ref 31–37)
MCV RBC AUTO: 76.9 FL
MONOCYTES # BLD AUTO: 0.85 X10(3) UL (ref 0.1–1)
MONOCYTES NFR BLD AUTO: 7.2 %
NEUTROPHILS # BLD AUTO: 8.47 X10 (3) UL (ref 1.5–7.7)
NEUTROPHILS # BLD AUTO: 8.47 X10(3) UL (ref 1.5–7.7)
NEUTROPHILS NFR BLD AUTO: 72 %
PLATELET # BLD AUTO: 234 10(3)UL (ref 150–450)
RBC # BLD AUTO: 4.8 X10(6)UL
RH BLOOD TYPE: NEGATIVE
SARS-COV-2 RNA RESP QL NAA+PROBE: NOT DETECTED
WBC # BLD AUTO: 11.8 X10(3) UL (ref 4–11)

## 2022-04-06 PROCEDURE — 88307 TISSUE EXAM BY PATHOLOGIST: CPT | Performed by: OBSTETRICS & GYNECOLOGY

## 2022-04-06 PROCEDURE — 10907ZC DRAINAGE OF AMNIOTIC FLUID, THERAPEUTIC FROM PRODUCTS OF CONCEPTION, VIA NATURAL OR ARTIFICIAL OPENING: ICD-10-PCS | Performed by: OBSTETRICS & GYNECOLOGY

## 2022-04-06 PROCEDURE — 99214 OFFICE O/P EST MOD 30 MIN: CPT

## 2022-04-06 PROCEDURE — 86901 BLOOD TYPING SEROLOGIC RH(D): CPT | Performed by: OBSTETRICS & GYNECOLOGY

## 2022-04-06 PROCEDURE — 59025 FETAL NON-STRESS TEST: CPT

## 2022-04-06 PROCEDURE — 99213 OFFICE O/P EST LOW 20 MIN: CPT

## 2022-04-06 PROCEDURE — 85025 COMPLETE CBC W/AUTO DIFF WBC: CPT | Performed by: OBSTETRICS & GYNECOLOGY

## 2022-04-06 PROCEDURE — 86900 BLOOD TYPING SEROLOGIC ABO: CPT | Performed by: OBSTETRICS & GYNECOLOGY

## 2022-04-06 PROCEDURE — 85461 HEMOGLOBIN FETAL: CPT | Performed by: OBSTETRICS & GYNECOLOGY

## 2022-04-06 PROCEDURE — 86850 RBC ANTIBODY SCREEN: CPT | Performed by: OBSTETRICS & GYNECOLOGY

## 2022-04-06 RX ORDER — TRISODIUM CITRATE DIHYDRATE AND CITRIC ACID MONOHYDRATE 500; 334 MG/5ML; MG/5ML
30 SOLUTION ORAL AS NEEDED
Status: DISCONTINUED | OUTPATIENT
Start: 2022-04-06 | End: 2022-04-06 | Stop reason: HOSPADM

## 2022-04-06 RX ORDER — AMMONIA INHALANTS 0.04 G/.3ML
0.3 INHALANT RESPIRATORY (INHALATION) AS NEEDED
Status: DISCONTINUED | OUTPATIENT
Start: 2022-04-06 | End: 2022-04-06 | Stop reason: HOSPADM

## 2022-04-06 RX ORDER — BUPIVACAINE HYDROCHLORIDE 2.5 MG/ML
20 INJECTION, SOLUTION EPIDURAL; INFILTRATION; INTRACAUDAL ONCE
Status: DISCONTINUED | OUTPATIENT
Start: 2022-04-06 | End: 2022-04-06 | Stop reason: HOSPADM

## 2022-04-06 RX ORDER — DIAPER,BRIEF,INFANT-TODD,DISP
1 EACH MISCELLANEOUS EVERY 6 HOURS PRN
Status: DISCONTINUED | OUTPATIENT
Start: 2022-04-06 | End: 2022-04-07

## 2022-04-06 RX ORDER — ACETAMINOPHEN 500 MG
500 TABLET ORAL EVERY 6 HOURS PRN
Status: DISCONTINUED | OUTPATIENT
Start: 2022-04-06 | End: 2022-04-07

## 2022-04-06 RX ORDER — IBUPROFEN 600 MG/1
600 TABLET ORAL EVERY 6 HOURS PRN
Status: DISCONTINUED | OUTPATIENT
Start: 2022-04-06 | End: 2022-04-06 | Stop reason: HOSPADM

## 2022-04-06 RX ORDER — SIMETHICONE 80 MG
80 TABLET,CHEWABLE ORAL 3 TIMES DAILY PRN
Status: DISCONTINUED | OUTPATIENT
Start: 2022-04-06 | End: 2022-04-07

## 2022-04-06 RX ORDER — ACETAMINOPHEN 500 MG
500 TABLET ORAL EVERY 6 HOURS PRN
Status: DISCONTINUED | OUTPATIENT
Start: 2022-04-06 | End: 2022-04-06 | Stop reason: HOSPADM

## 2022-04-06 RX ORDER — ONDANSETRON 2 MG/ML
4 INJECTION INTRAMUSCULAR; INTRAVENOUS EVERY 6 HOURS PRN
Status: DISCONTINUED | OUTPATIENT
Start: 2022-04-06 | End: 2022-04-06 | Stop reason: HOSPADM

## 2022-04-06 RX ORDER — BUPIVACAINE HCL/0.9 % NACL/PF 0.25 %
5 PLASTIC BAG, INJECTION (ML) EPIDURAL AS NEEDED
Status: DISCONTINUED | OUTPATIENT
Start: 2022-04-06 | End: 2022-04-07

## 2022-04-06 RX ORDER — ONDANSETRON 2 MG/ML
4 INJECTION INTRAMUSCULAR; INTRAVENOUS EVERY 6 HOURS PRN
Status: DISCONTINUED | OUTPATIENT
Start: 2022-04-06 | End: 2022-04-07

## 2022-04-06 RX ORDER — LIDOCAINE HYDROCHLORIDE 10 MG/ML
INJECTION, SOLUTION INFILTRATION; PERINEURAL
Status: COMPLETED | OUTPATIENT
Start: 2022-04-06 | End: 2022-04-06

## 2022-04-06 RX ORDER — NALBUPHINE HCL 10 MG/ML
2.5 AMPUL (ML) INJECTION
Status: DISCONTINUED | OUTPATIENT
Start: 2022-04-06 | End: 2022-04-07

## 2022-04-06 RX ORDER — IBUPROFEN 600 MG/1
600 TABLET ORAL EVERY 6 HOURS
Status: DISCONTINUED | OUTPATIENT
Start: 2022-04-06 | End: 2022-04-07

## 2022-04-06 RX ORDER — BUPIVACAINE HYDROCHLORIDE 2.5 MG/ML
INJECTION, SOLUTION EPIDURAL; INFILTRATION; INTRACAUDAL
Status: COMPLETED | OUTPATIENT
Start: 2022-04-06 | End: 2022-04-06

## 2022-04-06 RX ORDER — LIDOCAINE HYDROCHLORIDE 10 MG/ML
30 INJECTION, SOLUTION EPIDURAL; INFILTRATION; INTRACAUDAL; PERINEURAL ONCE
Status: DISCONTINUED | OUTPATIENT
Start: 2022-04-06 | End: 2022-04-06 | Stop reason: HOSPADM

## 2022-04-06 RX ORDER — TERBUTALINE SULFATE 1 MG/ML
0.25 INJECTION, SOLUTION SUBCUTANEOUS AS NEEDED
Status: DISCONTINUED | OUTPATIENT
Start: 2022-04-06 | End: 2022-04-06 | Stop reason: HOSPADM

## 2022-04-06 RX ORDER — SODIUM CHLORIDE, SODIUM LACTATE, POTASSIUM CHLORIDE, CALCIUM CHLORIDE 600; 310; 30; 20 MG/100ML; MG/100ML; MG/100ML; MG/100ML
INJECTION, SOLUTION INTRAVENOUS CONTINUOUS
Status: DISCONTINUED | OUTPATIENT
Start: 2022-04-06 | End: 2022-04-07

## 2022-04-06 RX ORDER — DOCUSATE SODIUM 100 MG/1
100 CAPSULE, LIQUID FILLED ORAL
Status: DISCONTINUED | OUTPATIENT
Start: 2022-04-06 | End: 2022-04-07

## 2022-04-06 RX ORDER — ACETAMINOPHEN 500 MG
1000 TABLET ORAL EVERY 6 HOURS PRN
Status: DISCONTINUED | OUTPATIENT
Start: 2022-04-06 | End: 2022-04-07

## 2022-04-06 RX ORDER — LIDOCAINE HYDROCHLORIDE AND EPINEPHRINE 15; 5 MG/ML; UG/ML
INJECTION, SOLUTION EPIDURAL
Status: COMPLETED | OUTPATIENT
Start: 2022-04-06 | End: 2022-04-06

## 2022-04-06 RX ORDER — BISACODYL 10 MG
10 SUPPOSITORY, RECTAL RECTAL ONCE AS NEEDED
Status: DISCONTINUED | OUTPATIENT
Start: 2022-04-06 | End: 2022-04-07

## 2022-04-06 RX ORDER — AMMONIA INHALANTS 0.04 G/.3ML
0.3 INHALANT RESPIRATORY (INHALATION) AS NEEDED
Status: DISCONTINUED | OUTPATIENT
Start: 2022-04-06 | End: 2022-04-07

## 2022-04-06 RX ADMIN — LIDOCAINE HYDROCHLORIDE 5 ML: 10 INJECTION, SOLUTION INFILTRATION; PERINEURAL at 16:20:00

## 2022-04-06 RX ADMIN — LIDOCAINE HYDROCHLORIDE AND EPINEPHRINE 5 ML: 15; 5 INJECTION, SOLUTION EPIDURAL at 16:20:00

## 2022-04-06 RX ADMIN — BUPIVACAINE HYDROCHLORIDE 5 ML: 2.5 INJECTION, SOLUTION EPIDURAL; INFILTRATION; INTRACAUDAL at 16:20:00

## 2022-04-06 NOTE — PROGRESS NOTES
Discussed SVE with Dr Helen Ayers, plan to DC pt home. Discussed S&S of active labor and SROM. Encouraged pt to RTC with S&S of active labor, decreased FM, or LOF. Enc pt to keep appt 4/7. Discharged to home per ambulatory in stable condition with written and verbal instructions. Patient verbalizes understanding of information given.    Blu Winston RN

## 2022-04-06 NOTE — L&D DELIVERY NOTE
[facility]    Vaginal Delivery Note    Corby Ivory Patient Status:  Outpatient    8/15/1990 MRN [id number]   Location [address] Attending Dot Jenkins MD   Hosp Day # 0 PCP Sandie Minor,      Delivery     Infant Info:  Date of Delivery: 2022   Time of Delivery: 5:40 PM  Delivery Type: Normal spontaneous vaginal delivery    Live Information for the patient's : Dat Mac [[id number]]   male infant Information for the patient's : Dat Bubba [[id number]]   7 lb 9 oz (3.43 kg)   Apgars:  1 minute: 9               5 minutes: 9                        10 minutes:      Presentation Vertex [1]    Position   Occiput [1] Anterior [1]    Delivery Comment: Baby OA. Nuchal cord x 1 tight- cut at perineum. Shoulder atraumatically delivered followed by the trunk and LE. Nasopharyngeally bulb suctioned at the perineum. Baby handed to the awaiting nursing personal. Segment of the cord collected for gases. Placenta delivered complete and intact with a 3 VC. Perineum intact, no lacerations. Sponge count correct. Placenta sent to pathology. [place] and patient stable in the delivery room with nursing present. Maternal Anesthesia: epidural     Delivery Complications:  none    Neonatologist Present: no    Placenta info:  Date/Time of Delivery: 2022  5:44 PM   Delivery: spontaneous  Placenta to Pathology: no    Cord info:  Cord Gases Submitted: yes  Cord Blood Collection: no  Cord Tissue Collection: no  Cord Complications: single nuchal x 1 tight, cut at perineum    Sponge and Needle Counts:  Verified    Quantitative Blood Loss (mL) 10    Jennifer Orellana MD   2022  6:20 PM

## 2022-04-06 NOTE — NST
Nonstress Test   Patient: Isabella Ford    Gestation: 39w6d    NST:       Variability: Moderate           Accelerations: Yes           Decelerations: None            Baseline: 125 BPM           Uterine Irritability: No           Contractions: Regular                    Contraction Frequency: 2-5 min                   Acoustic Stimulator: No           Nonstress Test Interpretation: Reactive           Nonstress Test Second Interpretation: Reactive          FHR Category: Category I          Additional Comments       Mikey Diamond RN

## 2022-04-06 NOTE — PLAN OF CARE
Problem: BIRTH - VAGINAL/ SECTION  Goal: Fetal and maternal status remain reassuring during the birth process  Description: INTERVENTIONS:  - Monitor vital signs  - Monitor fetal heart rate  - Monitor uterine activity  - Monitor labor progression (vaginal delivery)  - DVT prophylaxis (C/S delivery)  - Surgical antibiotic prophylaxis (C/S delivery)  Outcome: Progressing     Problem: PAIN - ADULT  Goal: Verbalizes/displays adequate comfort level or patient's stated pain goal  Description: INTERVENTIONS:  - Encourage pt to monitor pain and request assistance  - Assess pain using appropriate pain scale  - Administer analgesics based on type and severity of pain and evaluate response  - Implement non-pharmacological measures as appropriate and evaluate response  - Consider cultural and social influences on pain and pain management  - Manage/alleviate anxiety  - Utilize distraction and/or relaxation techniques  - Monitor for opioid side effects  - Notify MD/LIP if interventions unsuccessful or patient reports new pain  - Anticipate increased pain with activity and pre-medicate as appropriate  Outcome: Progressing     Problem: ANXIETY  Goal: Will report anxiety at manageable levels  Description: INTERVENTIONS:  - Administer medication as ordered  - Teach and rehearse alternative coping skills  - Provide emotional support with 1:1 interaction with staff  Outcome: Progressing   Patient's Long Term Goal:  Uncomplicated Vaginal Delivery    Interventions:  -Assessment  -Induction/Augmentation per protocol and MD order  -Education  -Intervention per protocol with education  -involve patient/family in POC  -See additional Care Plan goals for specific interventions    Patient's Short Term Goal:  Comfort and Pain Control    Interventions:  -Non Pharmacological pain interventions  -IV/IM and epidural pain medication per physician order and patient's request  -Education  -Involve patient in POC

## 2022-04-06 NOTE — ANESTHESIA PROCEDURE NOTES
Labor Analgesia    Date/Time: 4/6/2022 4:20 PM  Performed by: Pamela Garza MD  Authorized by: Pamela Garza MD       General Information and Staff    Start Time:  4/6/2022 4:10 PM  End Time:  4/6/2022 4:21 PM  Anesthesiologist:  Pamela Garza MD  Performed by:   Anesthesiologist  Patient Location:  OB  Site Identification: surface landmarks    Reason for Block: labor epidural    Preanesthetic Checklist: patient identified, IV checked, risks and benefits discussed, monitors and equipment checked, pre-op evaluation, timeout performed, anesthesia consent and sterile technique used      Procedure Details    Patient Position:  Sitting  Prep: Betadine and patient draped    Monitoring:  Heart rate, cardiac monitor and continuous pulse ox  Approach:  Midline    Epidural Needle    Injection Technique:  YULISSA air  Needle Type:  Tuohy  Needle Gauge:  18 G  Needle Length:  3.375 in  Needle Insertion Depth:  5.5  Location:  L3-4    Spinal Needle      Catheter    Catheter Type:  Side hole  Catheter Size:  20 G  Catheter at Skin Depth:  14  Test Dose:  Negative    Assessment  Sensory Level:  T8    Additional Comments

## 2022-04-06 NOTE — PROGRESS NOTES
Roddy Rocha is a 32year old female admitted to TR4/TR4-A. Patient presents with:  R/o Labor      Pt is [id number] 39w5d intra-uterine pregnancy. Reports contractions Q 5 min for 1 hour, denies LOF, reports good FM. History obtained, consents signed. Oriented to room, staff, and plan of care. SVE performed, 2.5/40/-3, rev'd POC with Dr Wade Brown, plan for pt to walk for 2 hours and then for repeat SVE.     Tisha Olmstead RN

## 2022-04-06 NOTE — DISCHARGE SUMMARY
[facility]    Discharge Summary    Cyn Cruz Patient Status:  Outpatient    8/15/1990 MRN [id number]   Location [address] [facility] Attending Ulysses Caroli, MD   Hosp Day # 0       Admission date:  2022    Delivering OB Clinician: Page    [facility]: Estimated Date of Delivery: 22    Gestational Age: 39w6d    Antepartum complications: Patient Active Problem List:     Chronic bilateral low back pain without sciatica     Excess skin of abdominal wall     Depression     Anxiety     Rh negative state in antepartum period     BMI 31.0-31.9,adult     Normal labor      Date of Delivery: 2022 Time of Delivery: 5:40 PM    Delivery Type: spontaneous vaginal delivery    Baby: Liveborn male Information for the patient's : Sandra Ahmadi [[id number]]   7 lb 9 oz (3.43 kg)  Apgars:  1 minute: 9  5 minutes: 910 minutes:       Intrapartum Complications: None    Discharge Date: 2022    Hospital Course:  Pt admitted in active labor with cx 7cm. AROM performed and pt progressed to an  of a viable male infant with a tight nuchal cord that had to be cut at perineum. Cord gases sent. No complications. Routine delivery and postpartum care.     Discharge Physical Exam:   /69   Pulse 100   LMP 2021   SpO2 100%   Breastfeeding Yes   General appearance:  alert, appears stated age, cooperative and no distress  Abdominal: soft, non-tender, no rebound  Uterus: firm, nontender, below umbilicus  Pelvic: deferred  Extremities: Homans sign is negative, no sign of DVT    Discharged Condition: stable    Disposition: home    Plan:     Follow-up appointment in 6 weeks with Dr. Austin Fitzpatrick        2022  6:12 PM

## 2022-04-07 VITALS
HEART RATE: 80 BPM | DIASTOLIC BLOOD PRESSURE: 59 MMHG | TEMPERATURE: 99 F | SYSTOLIC BLOOD PRESSURE: 104 MMHG | OXYGEN SATURATION: 100 % | RESPIRATION RATE: 18 BRPM

## 2022-04-07 LAB
BASOPHILS # BLD AUTO: 0.02 X10(3) UL (ref 0–0.2)
BASOPHILS NFR BLD AUTO: 0.2 %
DEPRECATED RDW RBC AUTO: 39 FL (ref 35.1–46.3)
EOSINOPHIL # BLD AUTO: 0.14 X10(3) UL (ref 0–0.7)
EOSINOPHIL NFR BLD AUTO: 1.1 %
ERYTHROCYTE [DISTWIDTH] IN BLOOD BY AUTOMATED COUNT: 14 % (ref 11–15)
HCT VFR BLD AUTO: 30.4 %
HGB BLD-MCNC: 9.4 G/DL
IMM GRANULOCYTES # BLD AUTO: 0.1 X10(3) UL (ref 0–1)
IMM GRANULOCYTES NFR BLD: 0.8 %
LYMPHOCYTES # BLD AUTO: 2.41 X10(3) UL (ref 1–4)
LYMPHOCYTES NFR BLD AUTO: 18.1 %
MCH RBC QN AUTO: 23.8 PG (ref 26–34)
MCHC RBC AUTO-ENTMCNC: 30.9 G/DL (ref 31–37)
MCV RBC AUTO: 77 FL
MONOCYTES # BLD AUTO: 1.27 X10(3) UL (ref 0.1–1)
MONOCYTES NFR BLD AUTO: 9.5 %
NEUTROPHILS # BLD AUTO: 9.36 X10 (3) UL (ref 1.5–7.7)
NEUTROPHILS # BLD AUTO: 9.36 X10(3) UL (ref 1.5–7.7)
NEUTROPHILS NFR BLD AUTO: 70.3 %
PLATELET # BLD AUTO: 196 10(3)UL (ref 150–450)
RBC # BLD AUTO: 3.95 X10(6)UL
WBC # BLD AUTO: 13.3 X10(3) UL (ref 4–11)

## 2022-04-07 PROCEDURE — 85025 COMPLETE CBC W/AUTO DIFF WBC: CPT | Performed by: OBSTETRICS & GYNECOLOGY

## 2022-04-07 NOTE — CM/SW NOTE
SW self referral due to finances/ [facility] resources    SW met with patient and FOB  bedside. SW confirmed face sheet contact as correct. Baby boy/girl name: Baby anthony Mayorga  Date & time of delivery:4/6/22 @ 5:40pm  Delivery method:Normal spontaneous vaginal delivery  Siblings age:15, 8, and 3 yr old. Patient employed: Yes  Length of maternity leave: 6 weeks    Father of baby employed: Yes  Length of paternity leave:Denied    Breast/bottle feed:Breast feed    Pediatrician:Dr. Nila Landa    Infant Insurance:Medicaid  Change  contacted:Yes    Mental Health History: Denied    Medications:n/a    Therapist:n/a    Psychiatrist:n/a    SW discussed signs, symptoms and risks associated with post partum depression & anxiety. SW provided pt with PMAD resources. Other resources provided: Pt endorses being current w/WIC services. Patient support system:FOB    Patient denied current questions/needs from SW.    SW/CM to remain available for support and/or discharge planning.       DEMI Kirk, [facility]  Social Work   YFLORI:#[id number]

## 2022-04-07 NOTE — PLAN OF CARE
Problem: BIRTH - VAGINAL/ SECTION  Goal: Fetal and maternal status remain reassuring during the birth process  Description: INTERVENTIONS:  - Monitor vital signs  - Monitor fetal heart rate  - Monitor uterine activity  - Monitor labor progression (vaginal delivery)  - DVT prophylaxis (C/S delivery)  - Surgical antibiotic prophylaxis (C/S delivery)  Outcome: Progressing     Problem: PAIN - ADULT  Goal: Verbalizes/displays adequate comfort level or patient's stated pain goal  Description: INTERVENTIONS:  - Encourage pt to monitor pain and request assistance  - Assess pain using appropriate pain scale  - Administer analgesics based on type and severity of pain and evaluate response  - Implement non-pharmacological measures as appropriate and evaluate response  - Consider cultural and social influences on pain and pain management  - Manage/alleviate anxiety  - Utilize distraction and/or relaxation techniques  - Monitor for opioid side effects  - Notify MD/LIP if interventions unsuccessful or patient reports new pain  - Anticipate increased pain with activity and pre-medicate as appropriate  Outcome: Progressing     Problem: ANXIETY  Goal: Will report anxiety at manageable levels  Description: INTERVENTIONS:  - Administer medication as ordered  - Teach and rehearse alternative coping skills  - Provide emotional support with 1:1 interaction with staff  Outcome: Progressing     Problem: POSTPARTUM  Goal: Long Term Goal:Experiences normal postpartum course  Description: INTERVENTIONS:  - Assess and monitor vital signs and lab values. - Assess fundus and lochia. - Provide ice/sitz baths for perineum discomfort. - Monitor healing of incision/episiotomy/laceration, and assess for signs and symptoms of infection and hematoma. - Assess bladder function and monitor for bladder distention.  - Provide/instruct/assist with pericare as needed. - Provide VTE prophylaxis as needed.   - Monitor bowel function.  - Encourage ambulation and provide assistance as needed. - Assess and monitor emotional status and provide social service/psych resources as needed. - Utilize standard precautions and use personal protective equipment as indicated. Ensure aseptic care of all intravenous lines and invasive tubes/drains.  - Obtain immunization and exposure to communicable diseases history. Outcome: Progressing  Goal: Optimize infant feeding at the breast  Description: INTERVENTIONS:  - Initiate breast feeding within first hour after birth. - Monitor effectiveness of current breast feeding efforts. - Assess support systems available to mother/family.  - Identify cultural beliefs/practices regarding lactation, letdown techniques, maternal food preferences. - Assess mother's knowledge and previous experience with breast feeding.  - Provide information as needed about early infant feeding cues (e.g., rooting, lip smacking, sucking fingers/hand) versus late cue of crying.  - Discuss/demonstrate breast feeding aids (e.g., infant sling, nursing footstool/pillows, and breast pumps). - Encourage mother/other family members to express feelings/concerns, and actively listen. - Educate father/SO about benefits of breast feeding and how to manage common lactation challenges. - Recommend avoidance of specific medications or substances incompatible with breast feeding.  - Assess and monitor for signs of nipple pain/trauma. - Instruct and provide assistance with proper latch. - Review techniques for milk expression (breast pumping) and storage of breast milk. Provide pumping equipment/supplies, instructions and assistance, as needed. - Encourage rooming-in and breast feeding on demand.  - Encourage skin-to-skin contact. - Provide LC support as needed. - Assess for and manage engorgement. - Provide breast feeding education handouts and information on community breast feeding support.    Outcome: Progressing  Goal: Establishment of adequate milk supply with medication/procedure interruptions  Description: INTERVENTIONS:  - Review techniques for milk expression (breast pumping). - Provide pumping equipment/supplies, instructions, and assistance until it is safe to breastfeed infant. Outcome: Progressing  Goal: Experiences normal breast weaning course  Description: INTERVENTIONS:  - Assess for and manage engorgement. - Instruct on breast care. - Provide comfort measures. Outcome: Progressing  Goal: Appropriate maternal -  bonding  Description: INTERVENTIONS:  - Assess caregiver- interactions. - Assess caregiver's emotional status and coping mechanisms. - Encourage caregiver to participate in  daily care. - Assess support systems available to mother/family.  - Provide /case management support as needed.   Outcome: Progressing

## 2022-04-07 NOTE — LACTATION NOTE
LACTATION NOTE - MOTHER      Evaluation Type: Inpatient    Problems identified  Problems identified: Knowledge deficit    Maternal history  Maternal history: Anxiety;Depression;Obesity  Other/comment: breast augementation    Breastfeeding goal  Breastfeeding goal: To maintain breast milk feeding per patient goal    Maternal Assessment  Bilateral Breasts: Symmetrical;Soft  Bilateral Nipples: Sore;Everted  Prior breastfeeding experience (comment below): Multip; Successful  Prior BF experience: comment: BF oldest child for 2 years, two younger children for 6 months  Breastfeeding Assistance: [facility] assistance declined at this time (Infant recently BF and sleeping in bassinet. Enc to call [facility] for assistance wiith next feeding.)    Pain assessment  Treatment of Sore Nipples: Lanolin    Guidelines for use of: Other (comment): BF education provided. Pt reports sore nipples, no visible damage or trauma observed. Lanolin provided. Enc to call  for assessment/assistance with feeding.

## 2022-04-07 NOTE — PROGRESS NOTES
Patient up to bathroom with assist x 2. Voided 200 mL. Patient transferred to mother/baby room 358 per wheelchair in stable condition with baby and personal belongings. Accompanied by significant other and staff. Report given to Emy Henriquez, mother/baby RN.

## 2022-04-07 NOTE — LACTATION NOTE
This note was copied from a baby's chart. LACTATION NOTE - INFANT    Evaluation Type  Evaluation Type: Inpatient    Problems & Assessment  Infant Assessment: Skin color: pink or appropriate for ethnicity; Minimal hunger cues present  Muscle tone: Appropriate for GA    Feeding Assessment  Summary Current Feeding: Adlib;Breastfeeding exclusively  Breastfeeding Assessment: Sleepy infant, recently fed

## 2022-04-08 NOTE — PLAN OF CARE
Patient IV confirmed removed, reasons to call MD reviewed, f/u appointment given, patient vitals wnl, A&O x4, pct took her via wheelchair to curbside dispo to home.

## 2022-04-30 ENCOUNTER — TELEPHONE (OUTPATIENT)
Dept: OBGYN UNIT | Facility: HOSPITAL | Age: 32
End: 2022-04-30

## 2022-05-24 ENCOUNTER — TELEPHONE (OUTPATIENT)
Dept: OBGYN CLINIC | Facility: CLINIC | Age: 32
End: 2022-05-24

## 2022-05-24 ENCOUNTER — POSTPARTUM (OUTPATIENT)
Dept: OBGYN CLINIC | Facility: CLINIC | Age: 32
End: 2022-05-24
Payer: MEDICAID

## 2022-05-24 VITALS
DIASTOLIC BLOOD PRESSURE: 78 MMHG | WEIGHT: 165 LBS | BODY MASS INDEX: 29 KG/M2 | HEART RATE: 67 BPM | SYSTOLIC BLOOD PRESSURE: 121 MMHG

## 2022-05-24 PROCEDURE — 0503F POSTPARTUM CARE VISIT: CPT | Performed by: OBSTETRICS & GYNECOLOGY

## 2022-05-24 PROCEDURE — 3074F SYST BP LT 130 MM HG: CPT | Performed by: OBSTETRICS & GYNECOLOGY

## 2022-05-24 PROCEDURE — 3078F DIAST BP <80 MM HG: CPT | Performed by: OBSTETRICS & GYNECOLOGY

## 2022-05-24 NOTE — TELEPHONE ENCOUNTER
Pt seen today by Dr. Yulia Salas and pt needs note to return to work 5/25 with no work restrictions. Pt asking to be posted on [employer].

## 2022-12-27 ENCOUNTER — OFFICE VISIT (OUTPATIENT)
Dept: FAMILY MEDICINE CLINIC | Facility: CLINIC | Age: 32
End: 2022-12-27
Payer: MEDICAID

## 2022-12-27 VITALS
WEIGHT: 180.81 LBS | HEIGHT: 63 IN | SYSTOLIC BLOOD PRESSURE: 107 MMHG | HEART RATE: 76 BPM | DIASTOLIC BLOOD PRESSURE: 72 MMHG | BODY MASS INDEX: 32.04 KG/M2 | TEMPERATURE: 98 F

## 2022-12-27 DIAGNOSIS — G89.29 CHRONIC BILATERAL LOW BACK PAIN WITHOUT SCIATICA: ICD-10-CM

## 2022-12-27 DIAGNOSIS — M54.50 CHRONIC BILATERAL LOW BACK PAIN WITHOUT SCIATICA: ICD-10-CM

## 2022-12-27 DIAGNOSIS — Z23 NEED FOR VACCINATION: ICD-10-CM

## 2022-12-27 DIAGNOSIS — E01.0 THYROMEGALY: ICD-10-CM

## 2022-12-27 DIAGNOSIS — Z00.00 ADULT GENERAL MEDICAL EXAM: Primary | ICD-10-CM

## 2022-12-27 PROCEDURE — 99395 PREV VISIT EST AGE 18-39: CPT | Performed by: FAMILY MEDICINE

## 2022-12-27 PROCEDURE — 3074F SYST BP LT 130 MM HG: CPT | Performed by: FAMILY MEDICINE

## 2022-12-27 PROCEDURE — 90686 IIV4 VACC NO PRSV 0.5 ML IM: CPT | Performed by: FAMILY MEDICINE

## 2022-12-27 PROCEDURE — 3008F BODY MASS INDEX DOCD: CPT | Performed by: FAMILY MEDICINE

## 2022-12-27 PROCEDURE — 3078F DIAST BP <80 MM HG: CPT | Performed by: FAMILY MEDICINE

## 2022-12-27 PROCEDURE — 90471 IMMUNIZATION ADMIN: CPT | Performed by: FAMILY MEDICINE

## 2022-12-27 PROCEDURE — 99212 OFFICE O/P EST SF 10 MIN: CPT | Performed by: FAMILY MEDICINE

## 2022-12-28 ENCOUNTER — HOSPITAL ENCOUNTER (OUTPATIENT)
Dept: GENERAL RADIOLOGY | Age: 32
Discharge: HOME | End: 2022-12-28
Attending: FAMILY MEDICINE
Payer: MEDICAID

## 2022-12-28 ENCOUNTER — LAB ENCOUNTER (OUTPATIENT)
Dept: LAB | Age: 32
End: 2022-12-28
Attending: FAMILY MEDICINE
Payer: MEDICAID

## 2022-12-28 DIAGNOSIS — G89.29 CHRONIC BILATERAL LOW BACK PAIN WITHOUT SCIATICA: ICD-10-CM

## 2022-12-28 DIAGNOSIS — Z00.00 ADULT GENERAL MEDICAL EXAM: ICD-10-CM

## 2022-12-28 DIAGNOSIS — M54.50 CHRONIC BILATERAL LOW BACK PAIN WITHOUT SCIATICA: ICD-10-CM

## 2022-12-28 LAB
ALBUMIN SERPL-MCNC: 3.9 G/DL (ref 3.4–5)
ALBUMIN/GLOB SERPL: 1 {RATIO} (ref 1–2)
ALP LIVER SERPL-CCNC: 104 U/L
ALT SERPL-CCNC: 31 U/L
ANION GAP SERPL CALC-SCNC: 10 MMOL/L (ref 0–18)
AST SERPL-CCNC: 14 U/L (ref 15–37)
BASOPHILS # BLD AUTO: 0.02 X10(3) UL (ref 0–0.2)
BASOPHILS NFR BLD AUTO: 0.3 %
BILIRUB SERPL-MCNC: 0.4 MG/DL (ref 0.1–2)
BUN BLD-MCNC: 12 MG/DL (ref 7–18)
BUN/CREAT SERPL: 18.5 (ref 10–20)
CALCIUM BLD-MCNC: 8.9 MG/DL (ref 8.5–10.1)
CHLORIDE SERPL-SCNC: 108 MMOL/L (ref 98–112)
CHOLEST SERPL-MCNC: 159 MG/DL (ref ?–200)
CO2 SERPL-SCNC: 23 MMOL/L (ref 21–32)
CREAT BLD-MCNC: 0.65 MG/DL
DEPRECATED RDW RBC AUTO: 37.8 FL (ref 35.1–46.3)
EOSINOPHIL # BLD AUTO: 0.26 X10(3) UL (ref 0–0.7)
EOSINOPHIL NFR BLD AUTO: 3.7 %
ERYTHROCYTE [DISTWIDTH] IN BLOOD BY AUTOMATED COUNT: 12.3 % (ref 11–15)
FASTING PATIENT LIPID ANSWER: YES
FASTING STATUS PATIENT QL REPORTED: YES
GFR SERPLBLD BASED ON 1.73 SQ M-ARVRAT: 120 ML/MIN/1.73M2 (ref 60–?)
GLOBULIN PLAS-MCNC: 4.1 G/DL (ref 2.8–4.4)
GLUCOSE BLD-MCNC: 93 MG/DL (ref 70–99)
HCT VFR BLD AUTO: 43.2 %
HDLC SERPL-MCNC: 42 MG/DL (ref 40–59)
HGB BLD-MCNC: 14.3 G/DL
IMM GRANULOCYTES # BLD AUTO: 0.01 X10(3) UL (ref 0–1)
IMM GRANULOCYTES NFR BLD: 0.1 %
LDLC SERPL CALC-MCNC: 87 MG/DL (ref ?–100)
LYMPHOCYTES # BLD AUTO: 2.45 X10(3) UL (ref 1–4)
LYMPHOCYTES NFR BLD AUTO: 35.2 %
MCH RBC QN AUTO: 28 PG (ref 26–34)
MCHC RBC AUTO-ENTMCNC: 33.1 G/DL (ref 31–37)
MCV RBC AUTO: 84.5 FL
MONOCYTES # BLD AUTO: 0.52 X10(3) UL (ref 0.1–1)
MONOCYTES NFR BLD AUTO: 7.5 %
NEUTROPHILS # BLD AUTO: 3.71 X10 (3) UL (ref 1.5–7.7)
NEUTROPHILS # BLD AUTO: 3.71 X10(3) UL (ref 1.5–7.7)
NEUTROPHILS NFR BLD AUTO: 53.2 %
NONHDLC SERPL-MCNC: 117 MG/DL (ref ?–130)
OSMOLALITY SERPL CALC.SUM OF ELEC: 291 MOSM/KG (ref 275–295)
PLATELET # BLD AUTO: 253 10(3)UL (ref 150–450)
POTASSIUM SERPL-SCNC: 3.8 MMOL/L (ref 3.5–5.1)
PROT SERPL-MCNC: 8 G/DL (ref 6.4–8.2)
RBC # BLD AUTO: 5.11 X10(6)UL
SODIUM SERPL-SCNC: 141 MMOL/L (ref 136–145)
TRIGL SERPL-MCNC: 172 MG/DL (ref 30–149)
TSI SER-ACNC: 2.77 MIU/ML (ref 0.36–3.74)
VLDLC SERPL CALC-MCNC: 28 MG/DL (ref 0–30)
WBC # BLD AUTO: 7 X10(3) UL (ref 4–11)

## 2022-12-28 PROCEDURE — 84443 ASSAY THYROID STIM HORMONE: CPT

## 2022-12-28 PROCEDURE — 36415 COLL VENOUS BLD VENIPUNCTURE: CPT

## 2022-12-28 PROCEDURE — 85025 COMPLETE CBC W/AUTO DIFF WBC: CPT

## 2022-12-28 PROCEDURE — 72110 X-RAY EXAM L-2 SPINE 4/>VWS: CPT | Performed by: FAMILY MEDICINE

## 2022-12-28 PROCEDURE — 80061 LIPID PANEL: CPT

## 2022-12-28 PROCEDURE — 80053 COMPREHEN METABOLIC PANEL: CPT

## 2023-01-05 ENCOUNTER — HOSPITAL ENCOUNTER (OUTPATIENT)
Dept: ULTRASOUND IMAGING | Age: 33
Discharge: HOME | End: 2023-01-05
Attending: FAMILY MEDICINE
Payer: MEDICAID

## 2023-01-05 DIAGNOSIS — E01.0 THYROMEGALY: ICD-10-CM

## 2023-01-05 PROCEDURE — 76536 US EXAM OF HEAD AND NECK: CPT | Performed by: FAMILY MEDICINE

## 2023-01-18 ENCOUNTER — OFFICE VISIT (OUTPATIENT)
Dept: PHYSICAL THERAPY | Age: 33
End: 2023-01-18
Attending: FAMILY MEDICINE
Payer: MEDICAID

## 2023-01-18 ENCOUNTER — TELEPHONE (OUTPATIENT)
Dept: PHYSICAL THERAPY | Age: 33
End: 2023-01-18

## 2023-01-18 DIAGNOSIS — M54.50 CHRONIC BILATERAL LOW BACK PAIN WITHOUT SCIATICA: ICD-10-CM

## 2023-01-18 DIAGNOSIS — G89.29 CHRONIC BILATERAL LOW BACK PAIN WITHOUT SCIATICA: ICD-10-CM

## 2023-01-18 PROCEDURE — 97110 THERAPEUTIC EXERCISES: CPT | Performed by: PHYSICAL THERAPIST

## 2023-01-18 PROCEDURE — 97161 PT EVAL LOW COMPLEX 20 MIN: CPT | Performed by: PHYSICAL THERAPIST

## 2023-01-24 ENCOUNTER — APPOINTMENT (OUTPATIENT)
Dept: PHYSICAL THERAPY | Age: 33
End: 2023-01-24
Attending: FAMILY MEDICINE
Payer: MEDICAID

## 2023-01-31 ENCOUNTER — APPOINTMENT (OUTPATIENT)
Dept: PHYSICAL THERAPY | Age: 33
End: 2023-01-31
Attending: FAMILY MEDICINE
Payer: MEDICAID

## 2023-02-07 ENCOUNTER — APPOINTMENT (OUTPATIENT)
Dept: PHYSICAL THERAPY | Age: 33
End: 2023-02-07
Attending: FAMILY MEDICINE
Payer: MEDICAID

## 2023-02-16 ENCOUNTER — APPOINTMENT (OUTPATIENT)
Dept: PHYSICAL THERAPY | Age: 33
End: 2023-02-16
Attending: FAMILY MEDICINE
Payer: MEDICAID

## 2023-02-21 ENCOUNTER — APPOINTMENT (OUTPATIENT)
Dept: PHYSICAL THERAPY | Age: 33
End: 2023-02-21
Attending: FAMILY MEDICINE
Payer: MEDICAID

## 2023-02-23 ENCOUNTER — APPOINTMENT (OUTPATIENT)
Dept: PHYSICAL THERAPY | Age: 33
End: 2023-02-23
Attending: FAMILY MEDICINE
Payer: MEDICAID

## 2023-02-27 ENCOUNTER — APPOINTMENT (OUTPATIENT)
Dept: PHYSICAL THERAPY | Age: 33
End: 2023-02-27
Attending: FAMILY MEDICINE
Payer: MEDICAID

## 2023-03-02 ENCOUNTER — APPOINTMENT (OUTPATIENT)
Dept: PHYSICAL THERAPY | Age: 33
End: 2023-03-02
Attending: FAMILY MEDICINE
Payer: MEDICAID

## 2023-04-20 ENCOUNTER — EKG ENCOUNTER (OUTPATIENT)
Dept: LAB | Age: 33
End: 2023-04-20
Attending: FAMILY MEDICINE
Payer: MEDICAID

## 2023-04-20 ENCOUNTER — LAB ENCOUNTER (OUTPATIENT)
Dept: LAB | Age: 33
End: 2023-04-20
Attending: FAMILY MEDICINE
Payer: MEDICAID

## 2023-04-20 ENCOUNTER — OFFICE VISIT (OUTPATIENT)
Dept: FAMILY MEDICINE CLINIC | Facility: CLINIC | Age: 33
End: 2023-04-20

## 2023-04-20 VITALS
DIASTOLIC BLOOD PRESSURE: 76 MMHG | HEIGHT: 63 IN | BODY MASS INDEX: 31.68 KG/M2 | SYSTOLIC BLOOD PRESSURE: 114 MMHG | TEMPERATURE: 97 F | WEIGHT: 178.81 LBS | HEART RATE: 72 BPM

## 2023-04-20 DIAGNOSIS — R42 INTERMITTENT LIGHTHEADEDNESS: ICD-10-CM

## 2023-04-20 DIAGNOSIS — R42 INTERMITTENT LIGHTHEADEDNESS: Primary | ICD-10-CM

## 2023-04-20 LAB
ANION GAP SERPL CALC-SCNC: 11 MMOL/L (ref 0–18)
BASOPHILS # BLD AUTO: 0.03 X10(3) UL (ref 0–0.2)
BASOPHILS NFR BLD AUTO: 0.3 %
BUN BLD-MCNC: 17 MG/DL (ref 7–18)
BUN/CREAT SERPL: 18.5 (ref 10–20)
CALCIUM BLD-MCNC: 9.2 MG/DL (ref 8.5–10.1)
CHLORIDE SERPL-SCNC: 106 MMOL/L (ref 98–112)
CO2 SERPL-SCNC: 24 MMOL/L (ref 21–32)
CREAT BLD-MCNC: 0.92 MG/DL
DEPRECATED RDW RBC AUTO: 41.1 FL (ref 35.1–46.3)
EOSINOPHIL # BLD AUTO: 0.19 X10(3) UL (ref 0–0.7)
EOSINOPHIL NFR BLD AUTO: 2 %
ERYTHROCYTE [DISTWIDTH] IN BLOOD BY AUTOMATED COUNT: 13.4 % (ref 11–15)
FASTING STATUS PATIENT QL REPORTED: NO
GFR SERPLBLD BASED ON 1.73 SQ M-ARVRAT: 85 ML/MIN/1.73M2 (ref 60–?)
GLUCOSE BLD-MCNC: 97 MG/DL (ref 70–99)
HCT VFR BLD AUTO: 41.3 %
HGB BLD-MCNC: 13.3 G/DL
IMM GRANULOCYTES # BLD AUTO: 0.02 X10(3) UL (ref 0–1)
IMM GRANULOCYTES NFR BLD: 0.2 %
LYMPHOCYTES # BLD AUTO: 3.18 X10(3) UL (ref 1–4)
LYMPHOCYTES NFR BLD AUTO: 34 %
MCH RBC QN AUTO: 27 PG (ref 26–34)
MCHC RBC AUTO-ENTMCNC: 32.2 G/DL (ref 31–37)
MCV RBC AUTO: 83.9 FL
MONOCYTES # BLD AUTO: 0.64 X10(3) UL (ref 0.1–1)
MONOCYTES NFR BLD AUTO: 6.8 %
NEUTROPHILS # BLD AUTO: 5.29 X10 (3) UL (ref 1.5–7.7)
NEUTROPHILS # BLD AUTO: 5.29 X10(3) UL (ref 1.5–7.7)
NEUTROPHILS NFR BLD AUTO: 56.7 %
OSMOLALITY SERPL CALC.SUM OF ELEC: 293 MOSM/KG (ref 275–295)
PLATELET # BLD AUTO: 252 10(3)UL (ref 150–450)
POTASSIUM SERPL-SCNC: 4.1 MMOL/L (ref 3.5–5.1)
RBC # BLD AUTO: 4.92 X10(6)UL
SODIUM SERPL-SCNC: 141 MMOL/L (ref 136–145)
WBC # BLD AUTO: 9.4 X10(3) UL (ref 4–11)

## 2023-04-20 PROCEDURE — 3074F SYST BP LT 130 MM HG: CPT | Performed by: FAMILY MEDICINE

## 2023-04-20 PROCEDURE — 3078F DIAST BP <80 MM HG: CPT | Performed by: FAMILY MEDICINE

## 2023-04-20 PROCEDURE — 99214 OFFICE O/P EST MOD 30 MIN: CPT | Performed by: FAMILY MEDICINE

## 2023-04-20 PROCEDURE — 93005 ELECTROCARDIOGRAM TRACING: CPT

## 2023-04-20 PROCEDURE — 85025 COMPLETE CBC W/AUTO DIFF WBC: CPT

## 2023-04-20 PROCEDURE — 80048 BASIC METABOLIC PNL TOTAL CA: CPT

## 2023-04-20 PROCEDURE — 36415 COLL VENOUS BLD VENIPUNCTURE: CPT

## 2023-04-20 PROCEDURE — 93010 ELECTROCARDIOGRAM REPORT: CPT | Performed by: STUDENT IN AN ORGANIZED HEALTH CARE EDUCATION/TRAINING PROGRAM

## 2023-04-20 PROCEDURE — 3008F BODY MASS INDEX DOCD: CPT | Performed by: FAMILY MEDICINE

## 2023-04-21 LAB
ATRIAL RATE: 67 BPM
P AXIS: 68 DEGREES
P-R INTERVAL: 152 MS
Q-T INTERVAL: 392 MS
QRS DURATION: 104 MS
QTC CALCULATION (BEZET): 414 MS
R AXIS: 84 DEGREES
T AXIS: 67 DEGREES
VENTRICULAR RATE: 67 BPM

## 2023-05-31 ENCOUNTER — LAB ENCOUNTER (OUTPATIENT)
Dept: LAB | Facility: HOSPITAL | Age: 33
End: 2023-05-31
Attending: STUDENT IN AN ORGANIZED HEALTH CARE EDUCATION/TRAINING PROGRAM
Payer: MEDICAID

## 2023-05-31 ENCOUNTER — OFFICE VISIT (OUTPATIENT)
Dept: NEUROLOGY | Facility: CLINIC | Age: 33
End: 2023-05-31
Payer: MEDICAID

## 2023-05-31 VITALS — BODY MASS INDEX: 31.01 KG/M2 | HEIGHT: 63 IN | WEIGHT: 175 LBS

## 2023-05-31 DIAGNOSIS — R42 VERTIGO: Primary | ICD-10-CM

## 2023-05-31 DIAGNOSIS — G31.84 MILD COGNITIVE IMPAIRMENT: ICD-10-CM

## 2023-05-31 DIAGNOSIS — R42 DIZZINESS: ICD-10-CM

## 2023-05-31 DIAGNOSIS — R41.3 MEMORY PROBLEM: ICD-10-CM

## 2023-05-31 LAB
TSI SER-ACNC: 1.65 MIU/ML (ref 0.36–3.74)
VIT B12 SERPL-MCNC: 621 PG/ML (ref 193–986)

## 2023-05-31 PROCEDURE — 36415 COLL VENOUS BLD VENIPUNCTURE: CPT

## 2023-05-31 PROCEDURE — 99204 OFFICE O/P NEW MOD 45 MIN: CPT | Performed by: STUDENT IN AN ORGANIZED HEALTH CARE EDUCATION/TRAINING PROGRAM

## 2023-05-31 PROCEDURE — 82607 VITAMIN B-12: CPT

## 2023-05-31 PROCEDURE — 3008F BODY MASS INDEX DOCD: CPT | Performed by: STUDENT IN AN ORGANIZED HEALTH CARE EDUCATION/TRAINING PROGRAM

## 2023-05-31 PROCEDURE — 84443 ASSAY THYROID STIM HORMONE: CPT

## 2023-06-06 ENCOUNTER — OFFICE VISIT (OUTPATIENT)
Dept: OBGYN CLINIC | Facility: CLINIC | Age: 33
End: 2023-06-06

## 2023-06-06 VITALS
BODY MASS INDEX: 32 KG/M2 | HEART RATE: 72 BPM | DIASTOLIC BLOOD PRESSURE: 72 MMHG | WEIGHT: 180.19 LBS | SYSTOLIC BLOOD PRESSURE: 106 MMHG

## 2023-06-06 DIAGNOSIS — Z01.419 WELL WOMAN EXAM: Primary | ICD-10-CM

## 2023-06-06 DIAGNOSIS — Z12.4 CERVICAL CANCER SCREENING: ICD-10-CM

## 2023-06-06 PROCEDURE — 3078F DIAST BP <80 MM HG: CPT | Performed by: OBSTETRICS & GYNECOLOGY

## 2023-06-06 PROCEDURE — 3074F SYST BP LT 130 MM HG: CPT | Performed by: OBSTETRICS & GYNECOLOGY

## 2023-06-06 PROCEDURE — 99395 PREV VISIT EST AGE 18-39: CPT | Performed by: OBSTETRICS & GYNECOLOGY

## 2023-06-06 RX ORDER — METRONIDAZOLE 7.5 MG/G
1 GEL VAGINAL NIGHTLY
Qty: 1 EACH | Refills: 0 | Status: SHIPPED | OUTPATIENT
Start: 2023-06-06 | End: 2023-06-11

## 2023-06-07 LAB — HPV I/H RISK 1 DNA SPEC QL NAA+PROBE: NEGATIVE

## 2023-06-09 ENCOUNTER — HOSPITAL ENCOUNTER (OUTPATIENT)
Dept: CT IMAGING | Facility: HOSPITAL | Age: 33
Discharge: HOME | End: 2023-06-09
Attending: STUDENT IN AN ORGANIZED HEALTH CARE EDUCATION/TRAINING PROGRAM
Payer: MEDICAID

## 2023-06-09 DIAGNOSIS — R42 DIZZINESS: ICD-10-CM

## 2023-06-09 DIAGNOSIS — R42 VERTIGO: ICD-10-CM

## 2023-06-09 LAB
CREAT BLD-MCNC: 0.9 MG/DL
GFR SERPLBLD BASED ON 1.73 SQ M-ARVRAT: 87 ML/MIN/1.73M2 (ref 60–?)

## 2023-06-09 PROCEDURE — 82565 ASSAY OF CREATININE: CPT

## 2023-06-09 PROCEDURE — 70496 CT ANGIOGRAPHY HEAD: CPT | Performed by: STUDENT IN AN ORGANIZED HEALTH CARE EDUCATION/TRAINING PROGRAM

## 2023-06-09 PROCEDURE — 70498 CT ANGIOGRAPHY NECK: CPT | Performed by: STUDENT IN AN ORGANIZED HEALTH CARE EDUCATION/TRAINING PROGRAM

## 2023-06-17 ENCOUNTER — OFFICE VISIT (OUTPATIENT)
Dept: FAMILY MEDICINE CLINIC | Facility: CLINIC | Age: 33
End: 2023-06-17

## 2023-06-17 VITALS
DIASTOLIC BLOOD PRESSURE: 66 MMHG | BODY MASS INDEX: 32.25 KG/M2 | HEIGHT: 63 IN | SYSTOLIC BLOOD PRESSURE: 103 MMHG | TEMPERATURE: 97 F | HEART RATE: 65 BPM | WEIGHT: 182 LBS

## 2023-06-17 DIAGNOSIS — R35.0 URINARY FREQUENCY: ICD-10-CM

## 2023-06-17 DIAGNOSIS — R82.90 BAD ODOR OF URINE: Primary | ICD-10-CM

## 2023-06-17 LAB
APPEARANCE: CLEAR
BILIRUBIN: NEGATIVE
GLUCOSE (URINE DIPSTICK): NEGATIVE MG/DL
KETONES (URINE DIPSTICK): NEGATIVE MG/DL
MULTISTIX LOT#: ABNORMAL NUMERIC
NITRITE, URINE: NEGATIVE
PH, URINE: 5 (ref 4.5–8)
PROTEIN (URINE DIPSTICK): NEGATIVE MG/DL
SPECIFIC GRAVITY: 1.01 (ref 1–1.03)
URINE-COLOR: YELLOW
UROBILINOGEN,SEMI-QN: 0.2 MG/DL (ref 0–1.9)

## 2023-06-17 PROCEDURE — 3008F BODY MASS INDEX DOCD: CPT | Performed by: FAMILY MEDICINE

## 2023-06-17 PROCEDURE — 99213 OFFICE O/P EST LOW 20 MIN: CPT | Performed by: FAMILY MEDICINE

## 2023-06-17 PROCEDURE — 3074F SYST BP LT 130 MM HG: CPT | Performed by: FAMILY MEDICINE

## 2023-06-17 PROCEDURE — 81002 URINALYSIS NONAUTO W/O SCOPE: CPT | Performed by: FAMILY MEDICINE

## 2023-06-17 PROCEDURE — 3078F DIAST BP <80 MM HG: CPT | Performed by: FAMILY MEDICINE

## 2023-06-17 RX ORDER — SULFAMETHOXAZOLE AND TRIMETHOPRIM 800; 160 MG/1; MG/1
1 TABLET ORAL 2 TIMES DAILY
Qty: 10 TABLET | Refills: 0 | Status: SHIPPED | OUTPATIENT
Start: 2023-06-17 | End: 2023-06-22

## 2023-08-23 ENCOUNTER — HOSPITAL ENCOUNTER (OUTPATIENT)
Age: 33
Discharge: HOME | End: 2023-08-23
Payer: MEDICAID

## 2023-08-23 VITALS
RESPIRATION RATE: 18 BRPM | TEMPERATURE: 99 F | SYSTOLIC BLOOD PRESSURE: 108 MMHG | OXYGEN SATURATION: 100 % | DIASTOLIC BLOOD PRESSURE: 67 MMHG | HEART RATE: 99 BPM

## 2023-08-23 DIAGNOSIS — J03.80 ACUTE BACTERIAL TONSILLITIS: ICD-10-CM

## 2023-08-23 DIAGNOSIS — B96.89 ACUTE BACTERIAL TONSILLITIS: ICD-10-CM

## 2023-08-23 DIAGNOSIS — Z11.52 ENCOUNTER FOR SCREENING FOR COVID-19: Primary | ICD-10-CM

## 2023-08-23 LAB
S PYO AG THROAT QL: NEGATIVE
SARS-COV-2 RNA RESP QL NAA+PROBE: NOT DETECTED

## 2023-08-23 PROCEDURE — 99213 OFFICE O/P EST LOW 20 MIN: CPT | Performed by: NURSE PRACTITIONER

## 2023-08-23 PROCEDURE — U0002 COVID-19 LAB TEST NON-CDC: HCPCS | Performed by: NURSE PRACTITIONER

## 2023-08-23 PROCEDURE — 87880 STREP A ASSAY W/OPTIC: CPT | Performed by: NURSE PRACTITIONER

## 2023-08-23 RX ORDER — AMOXICILLIN 500 MG/1
500 TABLET, FILM COATED ORAL 2 TIMES DAILY
Qty: 20 TABLET | Refills: 0 | Status: SHIPPED | OUTPATIENT
Start: 2023-08-23 | End: 2023-09-02

## 2023-08-24 NOTE — DISCHARGE INSTRUCTIONS
Increase water intake, drink water, gatorade, and stick with a soft and liquid diet until throat is feeling better. Take ibuprofen every 6 hours for pain and swelling   AND/OR  Take tylenol every 6 hours for fever, chills, bodyaches. Take antibiotics prescribed, finish full course! Change toothbrush in 48 hours. Avoid sharing utensils, cups, foods with others. Avoid kissing. RETURN OR GO TO ED for fever > 103 despite medication, difficulty swallowing saliva, shortness of breath, worsening swelling to throat that you cannot tolerate fluids.
no

## 2023-11-22 ENCOUNTER — OFFICE VISIT (OUTPATIENT)
Dept: OBGYN CLINIC | Facility: CLINIC | Age: 33
End: 2023-11-22

## 2023-11-22 ENCOUNTER — LAB ENCOUNTER (OUTPATIENT)
Dept: LAB | Facility: HOSPITAL | Age: 33
End: 2023-11-22
Attending: OBSTETRICS & GYNECOLOGY
Payer: MEDICAID

## 2023-11-22 VITALS
HEART RATE: 74 BPM | DIASTOLIC BLOOD PRESSURE: 68 MMHG | BODY MASS INDEX: 29 KG/M2 | WEIGHT: 166 LBS | SYSTOLIC BLOOD PRESSURE: 110 MMHG

## 2023-11-22 DIAGNOSIS — Z11.3 SCREEN FOR STD (SEXUALLY TRANSMITTED DISEASE): ICD-10-CM

## 2023-11-22 DIAGNOSIS — Z11.3 SCREEN FOR STD (SEXUALLY TRANSMITTED DISEASE): Primary | ICD-10-CM

## 2023-11-22 PROCEDURE — 81514 NFCT DS BV&VAGINITIS DNA ALG: CPT | Performed by: OBSTETRICS & GYNECOLOGY

## 2023-11-22 PROCEDURE — 36415 COLL VENOUS BLD VENIPUNCTURE: CPT

## 2023-11-22 PROCEDURE — 87389 HIV-1 AG W/HIV-1&-2 AB AG IA: CPT

## 2023-11-22 PROCEDURE — 86780 TREPONEMA PALLIDUM: CPT

## 2023-11-22 PROCEDURE — 3074F SYST BP LT 130 MM HG: CPT | Performed by: OBSTETRICS & GYNECOLOGY

## 2023-11-22 PROCEDURE — 3078F DIAST BP <80 MM HG: CPT | Performed by: OBSTETRICS & GYNECOLOGY

## 2023-11-22 PROCEDURE — 99213 OFFICE O/P EST LOW 20 MIN: CPT | Performed by: OBSTETRICS & GYNECOLOGY

## 2023-11-22 NOTE — PROGRESS NOTES
Aristeo Rosenthal    8/15/1990   Pt would like to restart her nuvaring- discussed other options. She would also like std screen today. Chief Complaint   Patient presents with    Consult     DISCUSS B/C OPTIONS     Gyn Problem     STD TEST        Past Medical History:   Diagnosis Date    Anxiety     Chlamydia     Decorative tattoo        Past Surgical History:   Procedure Laterality Date    IMPLANTABLE BREAST PROSTHESIS  2018      ,     OTHER SURGICAL HISTORY  2018    Tummy tuck and fat transfer to buttocks        Pap Date: 23  Pap Result Notes: PAP NEG/ HPV NEG  Follow Up Recommendation: MAMMO 21 BENIGN      No current outpatient medications on file prior to visit. No current facility-administered medications on file prior to visit. Birth control method:vasectomy      PHYSICAL EXAM:       Constitutional: well developed, well nourished  Head/Face: normocephalic  Psychiatric:  Oriented to time, place, person and situation. Appropriate mood and affect    Pelvic Exam:  External Genitalia: normal appearance, hair distribution, and no lesions  Urethral Meatus:  normal in size, location, without lesions and prolapse  Vagina:  Normal appearance without lesions, no abnormal discharge  Cervix:  Normal appearance  Perineum: normal  Anus: no hemorhroids      [facility] was seen today for consult and gyn problem. Diagnoses and all orders for this visit:    Screen for STD (sexually transmitted disease)  -     HIV Ag/Ab Combo; Future  -     T Pallidum Screening Cascade; Future  -     Trichomonas vaginalis, CITLALI (Vaginal/Cervical); Future  -     Chlamydia/Gc Amplification;  Future

## 2023-11-24 ENCOUNTER — TELEPHONE (OUTPATIENT)
Dept: OBGYN CLINIC | Facility: CLINIC | Age: 33
End: 2023-11-24

## 2023-11-24 DIAGNOSIS — Z11.3 SCREEN FOR STD (SEXUALLY TRANSMITTED DISEASE): Primary | ICD-10-CM

## 2023-11-24 LAB — T PALLIDUM AB SER QL: NEGATIVE

## 2023-11-24 RX ORDER — ETONOGESTREL AND ETHINYL ESTRADIOL VAGINAL RING .015; .12 MG/D; MG/D
RING VAGINAL
Qty: 3 RING | Refills: 3 | Status: SHIPPED | OUTPATIENT
Start: 2023-11-24

## 2023-11-24 NOTE — TELEPHONE ENCOUNTER
Hannah calling from lab stating that the wrong tube was collected for trich order. Brandy Thao states that blue cap would be needed for trich only. Can run trich off of Vaginosis culture. Requesting order be changed. Order placed.

## 2023-11-24 NOTE — TELEPHONE ENCOUNTER
Last annual 6/6/23 with TRACEY    Pt was last seen in office on 11/22/23 with CAP.   Pt desires to restart Nuvaring but states rx was not sent to pharmacy. Nuvaring ordered and sent to pt's pharmacy. Pt notified and verbalized understanding.    Message to CAP for sign off. Thank you.

## 2023-11-24 NOTE — TELEPHONE ENCOUNTER
Per pt was suppose to have prescription for Nuvaring called in and nothing was sent to pharmacy. Please advise

## 2023-11-27 LAB
BV BACTERIA DNA VAG QL NAA+PROBE: NEGATIVE
C GLABRATA DNA VAG QL NAA+PROBE: NEGATIVE
C KRUSEI DNA VAG QL NAA+PROBE: NEGATIVE
CANDIDA DNA VAG QL NAA+PROBE: NEGATIVE
T VAGINALIS DNA VAG QL NAA+PROBE: NEGATIVE

## 2023-12-18 ENCOUNTER — TELEPHONE (OUTPATIENT)
Dept: OBGYN CLINIC | Facility: CLINIC | Age: 33
End: 2023-12-18

## 2023-12-18 NOTE — TELEPHONE ENCOUNTER
Patient is concerned with the changing color of the birth control she recently started using. Please advise.

## 2023-12-18 NOTE — TELEPHONE ENCOUNTER
Pt calling to report that she was prescribed nuvaring by CAP in November and has questions about it. Pt stated she was given Haloette and not nuvaring. Explained to pt that haloette is a generic of nuvaring and likely what her insurance covers. Pt verbalized understanding.      Pt also stated when she inserted the nuvaring, the ring was \"see through\" and when she removed if after the 3 weeks the ring was yellow. Pt denies any vaginal odor, discharge, itching/irritation. Pt informed message will be sent to CAP if this is normal for nuvaring or not.

## 2023-12-26 ENCOUNTER — OFFICE VISIT (OUTPATIENT)
Dept: OBGYN CLINIC | Facility: CLINIC | Age: 33
End: 2023-12-26
Payer: MEDICAID

## 2023-12-26 ENCOUNTER — LAB ENCOUNTER (OUTPATIENT)
Dept: LAB | Facility: HOSPITAL | Age: 33
End: 2023-12-26
Attending: OBSTETRICS & GYNECOLOGY
Payer: MEDICAID

## 2023-12-26 VITALS
DIASTOLIC BLOOD PRESSURE: 72 MMHG | BODY MASS INDEX: 29 KG/M2 | WEIGHT: 164.38 LBS | HEART RATE: 69 BPM | SYSTOLIC BLOOD PRESSURE: 106 MMHG

## 2023-12-26 DIAGNOSIS — Z11.3 SCREEN FOR STD (SEXUALLY TRANSMITTED DISEASE): ICD-10-CM

## 2023-12-26 DIAGNOSIS — R30.0 DYSURIA: ICD-10-CM

## 2023-12-26 DIAGNOSIS — L65.9 HAIR LOSS: ICD-10-CM

## 2023-12-26 DIAGNOSIS — N92.6 IRREGULAR MENSES: ICD-10-CM

## 2023-12-26 DIAGNOSIS — N92.6 IRREGULAR MENSES: Primary | ICD-10-CM

## 2023-12-26 LAB
BILIRUB UR QL: NEGATIVE
CLARITY UR: CLEAR
COLOR UR: COLORLESS
GLUCOSE UR-MCNC: NORMAL MG/DL
HCG SERPL QL: NEGATIVE
HGB UR QL STRIP.AUTO: NEGATIVE
KETONES UR-MCNC: NEGATIVE MG/DL
LEUKOCYTE ESTERASE UR QL STRIP.AUTO: NEGATIVE
NITRITE UR QL STRIP.AUTO: NEGATIVE
PH UR: 7.5 [PH] (ref 5–8)
PROT UR-MCNC: NEGATIVE MG/DL
SP GR UR STRIP: 1.01 (ref 1–1.03)
TSI SER-ACNC: 0.93 MIU/ML (ref 0.55–4.78)
UROBILINOGEN UR STRIP-ACNC: NORMAL

## 2023-12-26 PROCEDURE — 3074F SYST BP LT 130 MM HG: CPT | Performed by: OBSTETRICS & GYNECOLOGY

## 2023-12-26 PROCEDURE — 81003 URINALYSIS AUTO W/O SCOPE: CPT

## 2023-12-26 PROCEDURE — 84443 ASSAY THYROID STIM HORMONE: CPT

## 2023-12-26 PROCEDURE — 84703 CHORIONIC GONADOTROPIN ASSAY: CPT

## 2023-12-26 PROCEDURE — 36415 COLL VENOUS BLD VENIPUNCTURE: CPT

## 2023-12-26 PROCEDURE — 99214 OFFICE O/P EST MOD 30 MIN: CPT | Performed by: OBSTETRICS & GYNECOLOGY

## 2023-12-26 PROCEDURE — 3078F DIAST BP <80 MM HG: CPT | Performed by: OBSTETRICS & GYNECOLOGY

## 2023-12-26 NOTE — PROGRESS NOTES
Alcides Norwood    8/15/1990       Chief Complaint   Patient presents with    Gyn Problem     IRREGULAR BLEEDING   Pt restarted her nuvaring last month but did not start it with a menses- she thinks that she was already ? 2-3 days into her  menses when she placed the nuvaring. She now c/o spotting from her last menses until she just palced the 2nd nuvaring yesterday. I informed her that irreg bleeding may have been due to her starting the nuvaring at the wrong time of her cycle and that the irreg bleeding should clear up during the first 3 months of use of the ring. She asked for a preg test and I rec TSH. Pt also has had significant hair loss over the past 2 months- I TSH is normal then referred her to derm. Pt c/o painful urination for the past few days- will order UA. Past Medical History:   Diagnosis Date    Anxiety     Chlamydia     Decorative tattoo        Past Surgical History:   Procedure Laterality Date    IMPLANTABLE BREAST PROSTHESIS  2018      ,     OTHER SURGICAL HISTORY  2018    Tummy tuck and fat transfer to buttocks        Pap Date: 23  Pap Result Notes: PAP NEG/ HPV NEG  Follow Up Recommendation: [facility] 29- BENIGN      Current Outpatient Medications on File Prior to Visit   Medication Sig Dispense Refill    Etonogestrel-Ethinyl Estradiol (NUVARING) 0.12-0.015 MG/24HR Vaginal Ring Place 1 ring for 3 weeks. Remove for 7 days. Then replace with new ring 3 Ring 3     No current facility-administered medications on file prior to visit. Birth control method:Nuvaring      PHYSICAL EXAM:       Constitutional: well developed, well nourished  Head/Face: normocephalic  Abdomen:  soft, nontender, nondistended, no masses  Psychiatric:  Oriented to time, place, person and situation.  Appropriate mood and affect    Pelvic Exam:  External Genitalia: normal appearance, hair distribution, and no lesions  Urethral Meatus:  normal in size, location, without lesions and prolapse  Bladder:  No fullness, masses or tenderness  Vagina:  Normal appearance without lesions, no abnormal discharge  Cervix:  +yellow mucus on cervix- culture done, without tenderness on motion  Uterus: normal in size, contour, position, mobility, without tenderness  Adnexa: normal without masses or tenderness  Perineum: normal  Anus: no hemorhroids  Lymphatic: no palpable pelvic nodes     Narciso was seen today for gyn problem. Diagnoses and all orders for this visit:    Irregular menses  -     HCG, Beta Subunit, Qual; Future    Hair loss  -     Assay, Thyroid Stim Hormone; Future    Dysuria  -     Urinalysis with Culture Reflex; Future    Screen for STD (sexually transmitted disease)  -     Chlamydia/Gc Amplification; Future      Will follow up for test results.   Plan as above

## 2024-01-01 NOTE — TELEPHONE ENCOUNTER
Received staff message need for ultrasound for 3/23. Via [facility] core website sent to physician review. Case# [id number]. Await determination. Statement Selected

## 2024-01-01 NOTE — TELEPHONE ENCOUNTER
Acute Care - Speech Language Pathology NICU/PEDS Treatment Note  [facility]       Patient Name: Ze Ibrahim  : 2024  MRN: [id number]  Today's Date: 2024                 Admit Date: 2024  Visit Dx:      ICD-10-CM ICD-9-CM   1. Slow feeding in   P92.2 779.31       Patient Active Problem List   Diagnosis    Premature infant of 33 weeks gestation    Respiratory distress syndrome     Baby premature 33 weeks        No past medical history on file.     No past surgical history on file.    SLP Recommendation and Plan  SLP Swallowing Diagnosis: feeding difficulty (24 1120)  Habilitation Potential/Prognosis, Swallowing: good, to achieve stated therapy goals (24 112)  Swallow Criteria for Skilled Therapeutic Interventions Met: demonstrates skilled criteria (24)  Anticipated Dischage Disposition: home with parents (24)  Demonstrates Need for Referral to Another Service: lactation (24 112)  Therapy Frequency (Swallow): 5 days per week (24 1120)  Predicted Duration Therapy Intervention (Days): 2 weeks (24 112)              Plan for Continued Treatment (SLP): continue treatment per plan of care (24 112)    Plan of Care Review  Care Plan Reviewed With: mother, father (24 1325)   Progress: improving (24 1325)       Daily Summary of Progress (SLP): progress toward functional goals is good (24 1120)    NICU/PEDS EVAL (Last 72 Hours)       SLP NICU/Peds Eval/Treat       Row Name 24 1120 24 0500 24 0200       Infant Feeding/Swallowing Assessment/Intervention    Document Type therapy note (daily note)  -EN -- --    Reason for Evaluation reduced gestational Age  -EN -- --    Family Observations mother and father present  -EN -- --    Patient Effort good  -EN -- --       General Information    Patient Profile Reviewed yes  -EN -- --       NIPS (/Infant Pain Scale)    Facial Expression 0  -EN -- --  Agree. Pt with pain that is above the uterus at 16 weeks and resolves on its own.  To call if worsens    Cry 0  -EN -- --    Breathing Patterns 0  -EN -- --    Arms 0  -EN -- --    Legs 0  -EN -- --    State of Arousal 0  -EN -- --    NIPS Score 0  -EN -- --       Infant-Driven Feeding Readiness©    Infant-Driven Feeding Scales - Readiness 2  -EN -- --    Infant-Driven Feeding Scales - Quality 3  -EN -- --    Infant-Driven Feeding Scales - Caregiver Techniques A;B;C;E  -EN -- --       Breast Milk    Breast Milk Ordered Amount -- 1 mL  -DJ 1 mL  -DJ       Swallowing Treatment    Oral Feeding breast  -EN -- --       Breast    Pre-Feeding State Quiet/ alert;Demonstrating feeding cues  -EN -- --    Transition state Organized;From open crib;To family/caregiver  -EN -- --    Use Oral Stim Technique with cues  -EN -- --    Calming Techniques Used Quiet/dim environment  -EN -- --    Positioning with cues;football/clutch;left side;cradle;right side  -EN -- --    Effective Latch yes;adequate;maintained;with cues  -EN -- --    Milk Transfer yes;audible swallow;milk visible/in shield;jaw motion present  -EN -- --    Burst Cycle 11-15 seconds  -EN -- --    Endurance good  -EN -- --    Tongue Cupped/grooved  -EN -- --    Lip Closure Good  -EN -- --    Suck Strength Good  -EN -- --    Oral Motor Support Provided with cues  -EN -- --    Adequate Self-Pacing No  -EN -- --    External Pacing Used with cues  -EN -- --    Post-Feeding State Drowsy/ semi-doze  -EN -- --       Assessment    State Contr Strs Cu improved;with cues  -EN -- --    Resp Phys Stres Cue improved;with cues  -EN -- --    Coord Suck Swal Brth improved;with cues  -EN -- --    Stress Cues decreased  -EN -- --    Stress Cues Present short of breath/pant;catch-up breathing;O2 desaturation;apnea  -EN -- --    Efficiency increased  -EN -- --    Amount Offered  other (comment)  breast  -EN -- --    Intake Amount fed by family  -EN -- --    Active Nursing Time 15-20 minutes  -EN -- --       SLP Evaluation Clinical Impression    SLP Swallowing Diagnosis feeding difficulty   -EN -- --    Habilitation Potential/Prognosis, Swallowing good, to achieve stated therapy goals  -EN -- --    Swallow Criteria for Skilled Therapeutic Interventions Met demonstrates skilled criteria  -EN -- --       SLP Treatment Clinical Impression    Treatment Summary Infant seen for 1100 care time. He readily accepted nipple and suckled with good SSB coordination. He paced himself well. Infant had small spit at end of feeding -slight O2 drop. Accepted 48ml.  -EN -- --    Daily Summary of Progress (SLP) progress toward functional goals is good  -EN -- --    Barriers to Overall Progress (SLP) Prematurity  -EN -- --    Plan for Continued Treatment (SLP) continue treatment per plan of care  -EN -- --       Recommendations    Therapy Frequency (Swallow) 5 days per week  -EN -- --    Predicted Duration Therapy Intervention (Days) 2 weeks  -EN -- --    SLP Diet Recommendation thin  -EN -- --    Bottle/Nipple Recommendations Dr. Brown's Ultra Preemie  -EN -- --    Positioning Recommendations elevated sidelying  -EN -- --    Feeding Strategy Recommendations chin support;cheek support;occasional external pacing;dim/quiet environment;swaddle;frequent burping  -EN -- --    Discussed Plan parent/caregiver;RN  -EN -- --    Anticipated Dischage Disposition home with parents  -EN -- --    Demonstrates Need for Referral to Another Service lactation  -EN -- --       SLP Discharge Summary    Discharge Destination home with parents  -EN -- --       Caregiver Strategies Goal 1 (SLP)    Caregiver/Strategies Goal 1 implement safe feeding strategies;identify infant stress cues during feeding;80%;with minimal cues (75-90%)  -EN -- --    Time Frame (Caregiver/Strategies Goal 1, SLP) 2 weeks  -EN -- --    Progress (Ability to Perform Caregiver/Strategies Goal 1, SLP) 80%;with minimal cues (75-90%)  -EN -- --    Progress/Outcomes (Caregiver/Strategies Goal 1, SLP) continuing progress toward goal  -EN -- --       Nutritive Goal 1 (SLP)     Nutrition Goal 1 (SLP) improved organization skills during a feeding;improved suck, swallow, breathe coordination;maintain adequate latch during nutritive/non-nutritive sucking;tolerate PO utilizing bottle/nipple w/o signs of stress;80%;with minimal cues (75-90%)  -EN -- --    Time Frame (Nutritive Goal 1, SLP) 2 weeks  -EN -- --    Progress (Nutritive Goal 1,  SLP) 80%;with minimal cues (75-90%)  -EN -- --    Progress/Outcomes (Nutritive Goal 1, SLP) good progress toward goal  -EN -- --       Long Term Goal 1 (SLP)    Long Term Goal 1 demonstrate functional swallow;demonstrate safe, efficient PO feeding skills;tolerate all feedings by mouth w/o overt signs/symptoms of aspiration or distress;80%;with minimal cues (75-90%)  -EN -- --    Time Frame (Long Term Goal 1, SLP) 2 weeks  -EN -- --    Progress (Long Term Goal 1, SLP) 80%;with minimal cues (75-90%)  -EN -- --    Progress/Outcomes (Long Term Goal 1, SLP) good progress toward goal  -EN -- --      Row Name 24 2300 24 2000 24 1637       Breast Milk    Breast Milk Ordered Amount 1 mL  -DJ 1 mL  -DJ 1 mL  -LW      Row Name 24 1344 24 1100 24 0758       Breast Milk    Breast Milk Ordered Amount 1 mL  -LW 1 mL  -LW 1 mL  -LW      Row Name 24 0419 24 0134 24 2226       Breast Milk    Breast Milk Ordered Amount 1 mL  mbm 1:25  -LD 1 mL  mbm 1:25  -LD 1 mL  mbm 1:25  -LD      Row Name 24 1922 24 1400 24 0437       Infant Feeding/Swallowing Assessment/Intervention    Document Type -- therapy note (daily note)  -AV --    Family Observations -- mother  -AV --    Patient Effort -- good  -AV --       NIPS (/Infant Pain Scale)    Facial Expression -- 0  -AV --    Cry -- 0  -AV --    Breathing Patterns -- 0  -AV --    Arms -- 0  -AV --    Legs -- 0  -AV --    State of Arousal -- 0  -AV --    NIPS Score -- 0  -AV --       Breast Milk    Breast Milk Ordered Amount 1 mL  mbm 1:25  -LD -- 1 mL  mbm 1:25   -LD       Swallowing Treatment    Therapeutic Intervention Provided -- oral feeding  -AV --    Oral Feeding -- bottle  -AV --       Bottle    Pre-Feeding State -- Quiet/ alert;Demonstrating feeding cues  -AV --    Transition state -- Organized;Swaddled;From open crib  -AV --    Use Oral Stim Technique -- With cues  -AV --    Calming Techniques Used -- Quiet/dim environment  -AV --    Latch -- Shallow;With cues  -AV --    Positioning -- With cues;Elevated side-lying  -AV --    Burst Cycle -- 11-15 seconds  -AV --    Endurance -- good;fatigued end of feed  -AV --    Tongue -- Cupped/grooved  -AV --    Lip Closure -- Good  -AV --    Suck Strength -- Good  -AV --    Oral Motor Support Provided -- with cues  -AV --    Adequate Self-Pacing -- No  -AV --    External Pacing Used -- with cues  -AV --    Post-Feeding State -- Drowsy/ semi-doze  -AV --       Assessment    State Contr Strs Cu -- improved;with cues  -AV --    Resp Phys Stres Cue -- improved;with cues  -AV --    Coord Suck Swal Brth -- improved;with cues  -AV --    Stress Cues -- decreased  -AV --    Efficiency -- increased  -AV --    Environmental Adaptations -- Room lights dim;Room remained quiet  -AV --       SLP Evaluation Clinical Impression    SLP Swallowing Diagnosis -- feeding difficulty  -AV --    Habilitation Potential/Prognosis, Swallowing -- good, to achieve stated therapy goals  -AV --    Swallow Criteria for Skilled Therapeutic Interventions Met -- demonstrates skilled criteria  -AV --       SLP Treatment Clinical Impression    Daily Summary of Progress (SLP) -- progress toward functional goals is good  -AV --    Barriers to Overall Progress (SLP) -- Prematurity  -AV --    Plan for Continued Treatment (SLP) -- continue treatment per plan of care  -AV --       Recommendations    Therapy Frequency (Swallow) -- 5 days per week  -AV --    Predicted Duration Therapy Intervention (Days) -- 2 weeks  -AV --    SLP Diet Recommendation -- thin  -AV --     Bottle/Nipple Recommendations -- Dr. Thurston's Ultra Preemie  -AV --    Positioning Recommendations -- elevated sidelying  -AV --    Feeding Strategy Recommendations -- chin support;cheek support;occasional external pacing;dim/quiet environment;swaddle;frequent burping  -AV --    Discussed Plan -- RN  -AV --    Anticipated Dischage Disposition -- home with parents  -AV --    Demonstrates Need for Referral to Another Service -- lactation  -AV --       SLP Discharge Summary    Discharge Destination -- home with parents  -AV --      Row Name 09/23/24 0135 09/22/24 2226 09/22/24 2004       Breast Milk    Breast Milk Ordered Amount 1 mL  mbm 1:25  -LD 1 mL  mbm 1:25  -LD 1 mL  mbm 1:25  -LD              User Key  (r) = Recorded By, (t) = Taken By, (c) = Cosigned By      Initials Name Effective Dates    AV Kiara Sheffield, MS CCC-SLP 06/16/21 -     Juliane Agustin, RN 06/16/21 -     Jesús Can RN 06/16/21 -     Roxi Lemon, MS CCC-SLP 06/22/22 -     Jayshree Bhakta RN 05/24/24 -                     Infant-Driven Feeding Readiness©  Infant-Driven Feeding Scales - Readiness: Alert once handled. Some rooting or takes pacifier. Adequate tone. (09/25/24 1120)  Infant-Driven Feeding Scales - Quality: Difficulty coordinating SSB despite consistent suck. (09/25/24 1120)  Infant-Driven Feeding Scales - Caregiver Techniques: Modified Sidelying: Position infant in inclined sidelying position with head in midline to assist with bolus management., External Pacing: Tip bottle downward/break seal at breast to remove or decrease the flow of liquid to facilitate SSB patter., Specialty Nipple: Use nipple other than standard for specific purpose i.e. nipple shield, slow-flow, Tanya., Frequent Burping: Burp infant based on behavioral cues not on time or volume completed. (09/25/24 1120)    EDUCATION  Education completed in the following areas:   Developmental Feeding Skills Pre-Feeding Skills.          SLP GOALS       Row Name 09/25/24 1120             Caregiver Strategies Goal 1 (SLP)    Caregiver/Strategies Goal 1 implement safe feeding strategies;identify infant stress cues during feeding;80%;with minimal cues (75-90%)  -EN      Time Frame (Caregiver/Strategies Goal 1, SLP) 2 weeks  -EN      Progress (Ability to Perform Caregiver/Strategies Goal 1, SLP) 80%;with minimal cues (75-90%)  -EN      Progress/Outcomes (Caregiver/Strategies Goal 1, SLP) continuing progress toward goal  -EN         Nutritive Goal 1 (SLP)    Nutrition Goal 1 (SLP) improved organization skills during a feeding;improved suck, swallow, breathe coordination;maintain adequate latch during nutritive/non-nutritive sucking;tolerate PO utilizing bottle/nipple w/o signs of stress;80%;with minimal cues (75-90%)  -EN      Time Frame (Nutritive Goal 1, SLP) 2 weeks  -EN      Progress (Nutritive Goal 1,  SLP) 80%;with minimal cues (75-90%)  -EN      Progress/Outcomes (Nutritive Goal 1, SLP) good progress toward goal  -EN         Long Term Goal 1 (SLP)    Long Term Goal 1 demonstrate functional swallow;demonstrate safe, efficient PO feeding skills;tolerate all feedings by mouth w/o overt signs/symptoms of aspiration or distress;80%;with minimal cues (75-90%)  -EN      Time Frame (Long Term Goal 1, SLP) 2 weeks  -EN      Progress (Long Term Goal 1, SLP) 80%;with minimal cues (75-90%)  -EN      Progress/Outcomes (Long Term Goal 1, SLP) good progress toward goal  -EN                User Key  (r) = Recorded By, (t) = Taken By, (c) = Cosigned By      Initials Name Provider Type    Roxi Lemon MS CCC-SLP Speech and Language Pathologist                     Time Calculation:    Time Calculation- SLP       Row Name 09/25/24 1325             Time Calculation- SLP    SLP Start Time 1120  -EN      SLP Received On 09/25/24  -EN         Untimed Charges    57500-WD Treatment Swallow Minutes 25  -EN         Total Minutes    Untimed Charges Total Minutes 25   -EN       Total Minutes 25  -EN                User Key  (r) = Recorded By, (t) = Taken By, (c) = Cosigned By      Initials Name Provider Type    EN Roxi Olivier, MS CCC-SLP Speech and Language Pathologist                  Therapy Charges for Today       Code Description Service Date Service Provider Modifiers Qty    [id number]  ST TREATMENT SWALLOW 2 2024 Roxi Olivier MS CCC-SLP GN 1              MS HERNESTO Aylaa  2024

## 2024-01-02 ENCOUNTER — OFFICE VISIT (OUTPATIENT)
Dept: FAMILY MEDICINE CLINIC | Facility: CLINIC | Age: 34
End: 2024-01-02

## 2024-01-02 VITALS
HEIGHT: 63 IN | DIASTOLIC BLOOD PRESSURE: 68 MMHG | WEIGHT: 163 LBS | HEART RATE: 73 BPM | RESPIRATION RATE: 17 BRPM | SYSTOLIC BLOOD PRESSURE: 108 MMHG | BODY MASS INDEX: 28.88 KG/M2

## 2024-01-02 DIAGNOSIS — N89.8 VAGINAL ITCHING: Primary | ICD-10-CM

## 2024-01-02 PROCEDURE — 3008F BODY MASS INDEX DOCD: CPT | Performed by: NURSE PRACTITIONER

## 2024-01-02 PROCEDURE — 3074F SYST BP LT 130 MM HG: CPT | Performed by: NURSE PRACTITIONER

## 2024-01-02 PROCEDURE — 3078F DIAST BP <80 MM HG: CPT | Performed by: NURSE PRACTITIONER

## 2024-01-02 PROCEDURE — 99213 OFFICE O/P EST LOW 20 MIN: CPT | Performed by: NURSE PRACTITIONER

## 2024-01-02 RX ORDER — FLUCONAZOLE 200 MG/1
TABLET ORAL
Qty: 2 TABLET | Refills: 0 | Status: SHIPPED | OUTPATIENT
Start: 2024-01-02

## 2024-01-02 NOTE — PROGRESS NOTES
HPI    Patient presents for vaginal itchiness x 3 days.  Toni urinary symptoms.  Has tried otc medications without relief.  Denies vaginal discharge.      LMP in October.  Recently started the Nuvaring.      Review of Systems   Genitourinary:         Vaginal itching.         Vitals:    24 0752   BP: 108/68   Pulse: 73   Resp: 17   Weight: 163 lb (73.9 kg)   Height: 5' 3\" (1.6 m)     Body mass index is 28.87 kg/m².    Health Maintenance   Topic Date Due    COVID-19 Vaccine ( season) 2023    Influenza Vaccine (1) 10/01/2023    Annual Physical  2023    Annual Depression Screening  2024    Pap Smear  2026    DTaP,Tdap,and Td Vaccines (8 - Td or Tdap) 2028    Pneumococcal Vaccine: Birth to 64yrs  Aged Out       Patient's last menstrual period was 2023 (exact date).    Past Medical History:   Diagnosis Date    Anxiety     Chlamydia     Decorative tattoo        .  Past Surgical History:   Procedure Laterality Date    Implantable breast prosthesis  2018      ,     Other surgical history  2018    Tummy tuck and fat transfer to buttocks       Family History   Problem Relation Age of Onset    Diabetes Paternal Grandmother     Cancer Paternal Grandfather         per [employer]:  \"??Cancer?? (cause of death)\"    Cancer Maternal Uncle         Cancer, stomach    Breast Cancer Maternal Grandmother        Social History     Socioeconomic History    Marital status:      Spouse name: Not on file    Number of children: 4    Years of education: Not on file    Highest education level: Not on file   Occupational History    Not on file   Tobacco Use    Smoking status: Never    Smokeless tobacco: Never   Vaping Use    Vaping Use: Never used   Substance and Sexual Activity    Alcohol use: Not Currently    Drug use: Never    Sexual activity: Not on file   Other Topics Concern     Service Not Asked    Blood Transfusions Not Asked    Caffeine Concern Yes      Comment: Coffee, 1 cup daily    Occupational Exposure Not Asked    Hobby Hazards Not Asked    Sleep Concern Not Asked    Stress Concern Not Asked    Weight Concern Not Asked    Special Diet Not Asked    Back Care Not Asked    Exercise Not Asked    Bike Helmet Not Asked    Seat Belt Not Asked    Self-Exams Not Asked   Social History Narrative    Not on file     Social Determinants of Health     Financial Resource Strain: Not on file   Food Insecurity: Not on file   Transportation Needs: Not on file   Physical Activity: Not on file   Stress: Not on file   Social Connections: Not on file   Housing Stability: Not on file       Current Outpatient Medications   Medication Sig Dispense Refill    fluconazole 200 MG Oral Tab Take one tablet today, may repeat in 3 days if symptoms remain 2 tablet 0    Etonogestrel-Ethinyl Estradiol (NUVARING) 0.12-0.015 MG/24HR Vaginal Ring Place 1 ring for 3 weeks. Remove for 7 days. Then replace with new ring 3 Ring 3       Allergies:  No Known Allergies    Physical Exam  Vitals and nursing note reviewed.   Constitutional:       General: She is not in acute distress.     Appearance: Normal appearance. She is not ill-appearing.   HENT:      Head: Normocephalic and atraumatic.   Pulmonary:      Effort: No respiratory distress.   Neurological:      Mental Status: She is alert and oriented to person, place, and time.   Psychiatric:         Mood and Affect: Mood normal.         Behavior: Behavior normal.         Thought Content: Thought content normal.         Judgment: Judgment normal.          Assessment and Plan:  Problem List Items Addressed This Visit    None  Visit Diagnoses       Vaginal itching    -  Primary    Relevant Medications    fluconazole 200 MG Oral Tab           Fluconazole as directed.  Vaginal culture collected in office today.       Discussed plan of care with patient and patient is in agreement.  All questions answered. Patient to call with questions or  concerns.    Encouraged to sign up for My Chart if not already registered.

## 2024-01-04 ENCOUNTER — OFFICE VISIT (OUTPATIENT)
Dept: OBGYN CLINIC | Facility: CLINIC | Age: 34
End: 2024-01-04

## 2024-01-04 VITALS
BODY MASS INDEX: 29 KG/M2 | SYSTOLIC BLOOD PRESSURE: 112 MMHG | WEIGHT: 164.19 LBS | DIASTOLIC BLOOD PRESSURE: 71 MMHG | HEART RATE: 71 BPM

## 2024-01-04 DIAGNOSIS — N89.8 VAGINAL ITCHING: Primary | ICD-10-CM

## 2024-01-04 DIAGNOSIS — Z11.3 SCREEN FOR STD (SEXUALLY TRANSMITTED DISEASE): ICD-10-CM

## 2024-01-04 PROCEDURE — 3078F DIAST BP <80 MM HG: CPT | Performed by: NURSE PRACTITIONER

## 2024-01-04 PROCEDURE — 99213 OFFICE O/P EST LOW 20 MIN: CPT | Performed by: NURSE PRACTITIONER

## 2024-01-04 PROCEDURE — 3074F SYST BP LT 130 MM HG: CPT | Performed by: NURSE PRACTITIONER

## 2024-01-04 RX ORDER — NYSTATIN 100000 U/G
1 CREAM TOPICAL 2 TIMES DAILY
Qty: 30 G | Refills: 0 | Status: SHIPPED | OUTPATIENT
Start: 2024-01-04

## 2024-01-04 NOTE — PROGRESS NOTES
[facility]   Obstetrics and Gynecology    Celina Ariza is a 33 year old female  Patient's last menstrual period was 2023 (approximate).   Chief Complaint   Patient presents with    Gyn Problem     Vaginal itching that has not gotten better with OTC medication per pt // Vaginal pain    . She tried a one day monistat 2 days ago and didn't relieve symptoms.  C/o external itching throughout vulva and rectum.  Saw pcp 2 days ago and took fluconazole without improvement. Her vaginitis culture was negative.  No odor, no abnormal discharge.   No pelvic pain.  Has been scratching, wearing spandex underwear. Does wax but not recently.    Symptoms started 6 days ago. No changes in soaps or detergents. One new male partner. No condoms. Desires sti testing.    Pap:2023 NILM, neg HPV  Contraception: nuvaring    OBSTETRICS HISTORY:  OB History    Para Term  AB Living   5 4 4 0 1 4   SAB IAB Ectopic Multiple Live Births   0 0 0 0 4       GYNE HISTORY:  Menarche: 11 YEARS OLD (2024  1:43 PM)  Period Cycle (Days): Monthly (2024  1:43 PM)  Period Duration (Days): 7 days (2024  1:43 PM)  Period Flow: Moderate (2024  1:43 PM)  Use of Birth Control (if yes, specify type): Nuvaring (2024  1:43 PM)  Pap Date: 23 (2024  1:43 PM)  Pap Result Notes: Neg Pap/HPV // Mammo 21 Diag C2-Benign (2024  1:43 PM)  Follow Up Recommendation: Annual 23 TRACEY (2024  1:43 PM)      History   Sexual Activity    Sexual activity: Not on file       MEDICAL HISTORY:  Past Medical History:   Diagnosis Date    Anxiety     Chlamydia 2011    Decorative tattoo        SOCIAL HISTORY:  Social History     Socioeconomic History    Marital status:      Spouse name: Not on file    Number of children: 4    Years of education: Not on file    Highest education level: Not on file   Occupational History    Not on file   Tobacco Use    Smoking status: Never    Smokeless tobacco: Never    Vaping Use    Vaping Use: Never used   Substance and Sexual Activity    Alcohol use: Not Currently    Drug use: Never    Sexual activity: Not on file   Other Topics Concern     Service Not Asked    Blood Transfusions Not Asked    Caffeine Concern Yes     Comment: Coffee, 1 cup daily    Occupational Exposure Not Asked    Hobby Hazards Not Asked    Sleep Concern Not Asked    Stress Concern Not Asked    Weight Concern Not Asked    Special Diet Not Asked    Back Care Not Asked    Exercise Not Asked    Bike Helmet Not Asked    Seat Belt Not Asked    Self-Exams Not Asked   Social History Narrative    Not on file     Social Determinants of Health     Financial Resource Strain: Not on file   Food Insecurity: Not on file   Transportation Needs: Not on file   Physical Activity: Not on file   Stress: Not on file   Social Connections: Not on file   Housing Stability: Not on file       MEDICATIONS:    Current Outpatient Medications:     nystatin 100,000 Units/g External Cream, Apply 1 Application topically 2 (two) times daily., Disp: 30 g, Rfl: 0    Etonogestrel-Ethinyl Estradiol (NUVARING) 0.12-0.015 MG/24HR Vaginal Ring, Place 1 ring for 3 weeks. Remove for 7 days. Then replace with new ring, Disp: 3 Ring, Rfl: 3    fluconazole 200 MG Oral Tab, Take one tablet today, may repeat in 3 days if symptoms remain (Patient not taking: Reported on 1/4/2024), Disp: 2 tablet, Rfl: 0    ALLERGIES:  No Known Allergies      Review of Systems:  Constitutional:  Denies fatigue, night sweats, hot flashes  Cardiovascular:  denies chest pain or palpitations  Respiratory:  denies shortness of breath  Gastrointestinal:  denies heartburn, abdominal pain, diarrhea or constipation  Genitourinary:  denies dysuria, incontinence, abnormal vaginal discharge, +vaginal itching   Musculoskeletal:  denies back pain.  Skin/Breast:  Denies any breast pain, lumps, or discharge.   Neurological:  denies headaches, extremity weakness or  numbness.  Psychiatric: denies depression or anxiety.  Endocrine:   denies excessive thirst or urination.  Heme/Lymph:  denies history of anemia, easy bruising or bleeding.      PHYSICAL EXAM:     Vitals:    01/04/24 1348   BP: 112/71   Pulse: 71   Weight: 164 lb 3.2 oz (74.5 kg)     Body mass index is 29.09 kg/m².     Constitutional: well developed, well nourished    Psychiatric:  Oriented to time, place, person and situation. Appropriate mood and affect    Pelvic Exam:  External Genitalia: mild erythema above clitoris and throughout introitus, normal hair distribution, and no lesions  Urethral Meatus:  normal in size, location, without lesions and prolapse  Bladder:  No fullness, masses or tenderness  Vagina:  Normal appearance without lesions, no abnormal discharge  Cervix:  Normal without tenderness on motion  Uterus: normal in size, contour, position, mobility, without tenderness  Adnexa: normal without masses or tenderness  Perineum: normal  Anus: no hemorroids   Lymph node: no inguinal lymph nodes    Assessment & Plan:  Celina was seen today for gyn problem.    Diagnoses and all orders for this visit:    Vaginal itching    Other orders  -     nystatin 100,000 Units/g External Cream; Apply 1 Application topically 2 (two) times daily.    Will rx nystatin bid x 7-10 days  Epsom salt baths  Gc/ct testing ordered - already had vaginitis panel 2 days ago  Patient already tried monistat and fluconazole.  Avoid rubbing/irritating area.      OMAR Amado    This note was prepared using Dragon [employer] voice recognition dictation software. As a result errors may occur. When identified these errors have been corrected. While every attempt is made to correct errors during dictation discrepancies may still exist.

## 2024-02-26 ENCOUNTER — OFFICE VISIT (OUTPATIENT)
Dept: FAMILY MEDICINE CLINIC | Facility: CLINIC | Age: 34
End: 2024-02-26
Payer: MEDICAID

## 2024-02-26 ENCOUNTER — LAB ENCOUNTER (OUTPATIENT)
Dept: LAB | Age: 34
End: 2024-02-26
Attending: FAMILY MEDICINE
Payer: MEDICAID

## 2024-02-26 VITALS
BODY MASS INDEX: 28.59 KG/M2 | HEIGHT: 63 IN | TEMPERATURE: 97 F | WEIGHT: 161.38 LBS | SYSTOLIC BLOOD PRESSURE: 120 MMHG | HEART RATE: 71 BPM | DIASTOLIC BLOOD PRESSURE: 76 MMHG

## 2024-02-26 DIAGNOSIS — J35.1 ENLARGED TONSILS: ICD-10-CM

## 2024-02-26 DIAGNOSIS — R23.2 HOT FLASHES: ICD-10-CM

## 2024-02-26 DIAGNOSIS — R68.2 DRY MOUTH: Primary | ICD-10-CM

## 2024-02-26 DIAGNOSIS — R68.2 DRY MOUTH: ICD-10-CM

## 2024-02-26 LAB
BASOPHILS # BLD AUTO: 0.03 X10(3) UL (ref 0–0.2)
BASOPHILS NFR BLD AUTO: 0.3 %
CRP SERPL-MCNC: 0.6 MG/DL (ref ?–1)
DEPRECATED RDW RBC AUTO: 41.8 FL (ref 35.1–46.3)
EOSINOPHIL # BLD AUTO: 0.22 X10(3) UL (ref 0–0.7)
EOSINOPHIL NFR BLD AUTO: 2.4 %
ERYTHROCYTE [DISTWIDTH] IN BLOOD BY AUTOMATED COUNT: 14.5 % (ref 11–15)
ERYTHROCYTE [SEDIMENTATION RATE] IN BLOOD: 19 MM/HR
FASTING PATIENT GLUCOSE ANSWER: NO
GLUCOSE BLD-MCNC: 105 MG/DL (ref 70–99)
HCT VFR BLD AUTO: 40.6 %
HGB BLD-MCNC: 13.2 G/DL
IMM GRANULOCYTES # BLD AUTO: 0.02 X10(3) UL (ref 0–1)
IMM GRANULOCYTES NFR BLD: 0.2 %
LYMPHOCYTES # BLD AUTO: 2.76 X10(3) UL (ref 1–4)
LYMPHOCYTES NFR BLD AUTO: 30.5 %
MCH RBC QN AUTO: 26.1 PG (ref 26–34)
MCHC RBC AUTO-ENTMCNC: 32.5 G/DL (ref 31–37)
MCV RBC AUTO: 80.2 FL
MONOCYTES # BLD AUTO: 0.77 X10(3) UL (ref 0.1–1)
MONOCYTES NFR BLD AUTO: 8.5 %
NEUTROPHILS # BLD AUTO: 5.24 X10 (3) UL (ref 1.5–7.7)
NEUTROPHILS # BLD AUTO: 5.24 X10(3) UL (ref 1.5–7.7)
NEUTROPHILS NFR BLD AUTO: 58.1 %
PLATELET # BLD AUTO: 252 10(3)UL (ref 150–450)
RBC # BLD AUTO: 5.06 X10(6)UL
RHEUMATOID FACT SERPL-ACNC: <10 IU/ML (ref ?–14)
TSI SER-ACNC: 2.16 MIU/ML (ref 0.55–4.78)
WBC # BLD AUTO: 9 X10(3) UL (ref 4–11)

## 2024-02-26 PROCEDURE — 86235 NUCLEAR ANTIGEN ANTIBODY: CPT

## 2024-02-26 PROCEDURE — 84443 ASSAY THYROID STIM HORMONE: CPT

## 2024-02-26 PROCEDURE — 86225 DNA ANTIBODY NATIVE: CPT

## 2024-02-26 PROCEDURE — 85025 COMPLETE CBC W/AUTO DIFF WBC: CPT

## 2024-02-26 PROCEDURE — 82947 ASSAY GLUCOSE BLOOD QUANT: CPT

## 2024-02-26 PROCEDURE — 99214 OFFICE O/P EST MOD 30 MIN: CPT | Performed by: FAMILY MEDICINE

## 2024-02-26 PROCEDURE — 36415 COLL VENOUS BLD VENIPUNCTURE: CPT

## 2024-02-26 PROCEDURE — 86140 C-REACTIVE PROTEIN: CPT

## 2024-02-26 PROCEDURE — 86038 ANTINUCLEAR ANTIBODIES: CPT

## 2024-02-26 PROCEDURE — 86431 RHEUMATOID FACTOR QUANT: CPT

## 2024-02-26 PROCEDURE — 85652 RBC SED RATE AUTOMATED: CPT

## 2024-02-26 NOTE — PROGRESS NOTES
Patient ID: Celina Ariza is a 33 year old female.    HPI  Chief Complaint   Patient presents with    Dryness     Dry mouth     Hot Flashes     Last seen by me on 2023.    Pt c/o a dry mouth that began about 2 weeks ago. States that she drinks water so she does not feel she is dehydrated.  She is not feeling thirsty or having any polyuria.   States that she only has a dry mouth at night. Denies dry eyes or nasal congestion.     She also c/o hot flashes during the day that began a couple days ago. States that her hot flashes last for a few seconds. Denies diaphoresis. She saw gynecology in 2023. I reviewed her labs from 2023; her TSH was normal. I provided a referral to ENT.  She states the wave of heat comes over her body and then resolves within a few seconds.  She is not sweating when this happens.  It can happen randomly.      Wt Readings from Last 6 Encounters:   24 161 lb 6.4 oz   24 164 lb 3.2 oz   24 163 lb   23 164 lb 6.4 oz   23 166 lb   23 182 lb       BMI Readings from Last 6 Encounters:   24 28.59 kg/m²   24 29.09 kg/m²   24 28.87 kg/m²   23 29.12 kg/m²   23 29.41 kg/m²   23 32.24 kg/m²       BP Readings from Last 6 Encounters:   24 120/76   24 112/71   24 108/68   23 106/72   23 110/68   23 108/67         Review of Systems   Respiratory:  Negative for shortness of breath.    Cardiovascular:  Negative for chest pain.           Medical History:      Past Medical History:   Diagnosis Date    Anxiety     Chlamydia     Decorative tattoo        Past Surgical History:   Procedure Laterality Date    IMPLANTABLE BREAST PROSTHESIS  2018      ,     OTHER SURGICAL HISTORY  2018    Tummy tuck and fat transfer to buttocks          Current Outpatient Medications   Medication Sig Dispense Refill    Etonogestrel-Ethinyl Estradiol (NUVARING) 0.12-0.015 MG/24HR Vaginal  Ring Place 1 ring for 3 weeks. Remove for 7 days. Then replace with new ring 3 Ring 3     Allergies:No Known Allergies     Physical Exam:       Physical Exam  Blood pressure 120/76, pulse 71, temperature 97 °F (36.1 °C), height 5' 3\" (1.6 m), weight 161 lb 6.4 oz, last menstrual period 11/24/2023, not currently breastfeeding.      Physical Exam   Constitutional: Patient is oriented to person, place, and time. Patient appears well-developed and well-nourished. No distress.   Head: Normocephalic.   Right Ear: Tympanic membrane, external ear and ear canal normal.   Left Ear: Tympanic membrane, external ear and ear canal normal.   Nose: No mucosal edema or rhinorrhea.   Throat: Oropharynx is clear without exudate. Enlarged tonsils.  She does not know if she snores.  There is no exudate.  Eyes: Conjunctivae and EOM are normal.   Neck: Normal range of motion. No thyromegaly present.   Cardiovascular: Normal rate, regular rhythm and normal heart sounds. Brisk capillary refill.   Pulmonary/Chest: Effort normal and breath sounds normal. No respiratory distress.   Lymphadenopathy: Patient has no cervical adenopathy.  Neurological: Patient is alert and oriented to person, place, and time.   Skin: Skin is warm.  Skin is not diaphoretic.  Psychiatry: Normal mood and affect.    Vitals reviewed.           Assessment/Plan:      Diagnoses and all orders for this visit:    Dry mouth  -     C-Reactive Protein; Future  -     Sed Rate, Westergren (Automated); Future  -     Rheumatoid Arthritis Factor; Future  -     Connective Tissue Disease (RUT) Screen [E]; Future  -     Sjogren's AB, Anti-SS-A/-SS-B; Future  -     CBC With Differential With Platelet; Future  -     Glucose, Serum; Future  -     ENT - INTERNAL  We will go ahead and do some labs on her.  I would like her to see the ENT doctor due to the dry mouth and enlarged tonsils.  Hot flashes  -     C-Reactive Protein; Future  -     Sed Rate, Westergren (Automated); Future  -      Rheumatoid Arthritis Factor; Future  -     Connective Tissue Disease (RUT) Screen [E]; Future  -     Sjogren's AB, Anti-SS-A/-SS-B; Future  -     CBC With Differential With Platelet; Future  -     Glucose, Serum; Future   TSH  Enlarged tonsils  -     ENT - INTERNAL        Referrals (if applicable)  Orders Placed This Encounter   Procedures    ENT - INTERNAL     At the [facility] and at the [facility].     Referral Priority:   Routine     Referral Type:   OFFICE VISIT     Referred to Provider:   Jerry Bang MD     Requested Specialty:   OTOLARYNGOLOGY     Number of Visits Requested:   3       Follow up if symptoms persist.  Take medicine (if given) as prescribed.  Approach to treatment discussed and patient/family member understands and agrees to plan.     No follow-ups on file.    There are no Patient Instructions on file for this visit.    Duong Arreaga    2/26/2024    By signing my name below, Duong FALL,  attest that this documentation has been prepared under the direction and in the presence of Ramesh Lee DO.   Electronically Signed: Duong Arreaga, 2/26/2024, 4:31 PM.    I, Raemsh Lee DO,  personally performed the services described in this documentation. All medical record entries made by the scribe were at my direction and in my presence.  I have reviewed the chart and discharge instructions (if applicable) and agree that the record reflects my personal performance and is accurate and complete.  Ramesh Lee DO, 2/26/2024, 4:49 PM

## 2024-02-28 LAB — ENA SS-B IGG SER IA-ACNC: 0.5 U/ML

## 2024-02-29 ENCOUNTER — OFFICE VISIT (OUTPATIENT)
Dept: OTOLARYNGOLOGY | Facility: CLINIC | Age: 34
End: 2024-02-29

## 2024-02-29 VITALS — WEIGHT: 161 LBS | HEIGHT: 63 IN | TEMPERATURE: 97 F | BODY MASS INDEX: 28.53 KG/M2

## 2024-02-29 DIAGNOSIS — J34.3 HYPERTROPHY OF BOTH INFERIOR NASAL TURBINATES: ICD-10-CM

## 2024-02-29 DIAGNOSIS — R09.81 NASAL CONGESTION: ICD-10-CM

## 2024-02-29 DIAGNOSIS — R68.2 MOUTH DRYNESS: ICD-10-CM

## 2024-02-29 DIAGNOSIS — J35.1 TONSILLAR HYPERTROPHY: Primary | ICD-10-CM

## 2024-02-29 LAB — ENA SS-A IGG SER IA-ACNC: 0.5 U/ML

## 2024-02-29 PROCEDURE — 99203 OFFICE O/P NEW LOW 30 MIN: CPT | Performed by: STUDENT IN AN ORGANIZED HEALTH CARE EDUCATION/TRAINING PROGRAM

## 2024-02-29 RX ORDER — FLUTICASONE PROPIONATE 50 MCG
2 SPRAY, SUSPENSION (ML) NASAL 2 TIMES DAILY
Qty: 16 G | Refills: 3 | Status: SHIPPED | OUTPATIENT
Start: 2024-02-29

## 2024-02-29 NOTE — PROGRESS NOTES
Celina Ariza is a 33 year old female.   Chief Complaint   Patient presents with    Consult     Pt referred by pcp for   DX: Dry mouth   Enlarged tonsils         ASSESSMENT AND PLAN:   1. Tonsillar hypertrophy      2. Nasal congestion  33-year-old presents with dry mouth.  This is worse when she wakes up in the morning.  She does report some nasal congestion during night and mouth breathing.  She denies any snoring or apneas or daytime sleepiness.  She is not on any medications that might dry out her mouth.  She does not smoke.  She is drinking enough water.    On exam she had inferior turbinate hypertrophy on both sides.  The septum was slightly deviated.  Her tonsils were about 2/3+.  She had posterior pharyngeal cobblestoning present.    Discussed with her that her mouth may be dry if she is sleeping with her mouth open during sleep due to nasal congestion.  Discussed ways that we can try to help her breathe through her nose at night including Flonase and Claritin-D prior to sleep.  Her tonsils are medium sized she denies any snoring or sleep apnea concerns.  She did have posterior pharyngeal cobblestoning possibly indicative of an allergic or postnasal drip process leading to this nasal congestion.  If she is still not improved in 3 to 4 weeks she should return.  Also discussed Biotene mouth rinses and hydration.    Consult from Dr Lee regarding mouth dryness    3. Hypertrophy of both inferior nasal turbinates    - loratadine-pseudoephedrine ER  MG Oral Tablet 24 Hr; Take 1 tablet by mouth at bedtime.  Dispense: 30 tablet; Refill: 1    4. Mouth dryness        The patient indicates understanding of these issues and agrees to the plan.      EXAM:   Temp 97.3 °F (36.3 °C) (Tympanic)   Ht 5' 3\" (1.6 m)   Wt 161 lb (73 kg)   LMP 11/24/2023 (Approximate)   BMI 28.52 kg/m²     Pertinent exam findings may also be noted above in assessment and plan     System Details   Skin Inspection - Normal.    Constitutional Overall appearance - Normal.   Head/Face Symmetric, TMJ tenderness not present    Eyes EOMI, PERRL   Right ear:  Canal clear, TM intact, no ALONZO   Left ear:  Canal clear, TM intact, no ALONZO   Nose: Septum midline, inferior turbinates not enlarged, nasal valves without collapse    Oral cavity/Oropharynx: No lesions or masses on inspection or palpation, tonsils symmetric    Neck: Soft without LAD, thyroid not enlarged  Voice clear/ no stridor   Other:      Scopes and Procedures:             Current Outpatient Medications   Medication Sig Dispense Refill    fluticasone propionate 50 MCG/ACT Nasal Suspension 2 sprays by Nasal route in the morning and 2 sprays before bedtime. 16 g 3    loratadine-pseudoephedrine ER  MG Oral Tablet 24 Hr Take 1 tablet by mouth at bedtime. 30 tablet 1    Etonogestrel-Ethinyl Estradiol (NUVARING) 0.12-0.015 MG/24HR Vaginal Ring Place 1 ring for 3 weeks. Remove for 7 days. Then replace with new ring 3 Ring 3      Past Medical History:   Diagnosis Date    Anxiety     Chlamydia 2011    Decorative tattoo       Social History:  Social History     Socioeconomic History    Marital status:     Number of children: 4   Tobacco Use    Smoking status: Never    Smokeless tobacco: Never   Vaping Use    Vaping Use: Never used   Substance and Sexual Activity    Alcohol use: Not Currently    Drug use: Never   Other Topics Concern    Caffeine Concern Yes     Comment: Coffee, 1 cup daily          Jerry Bang MD  2/29/2024  4:32 PM

## 2024-03-01 LAB
DSDNA IGG SERPL IA-ACNC: 1.8 IU/ML
ENA AB SER QL IA: 0.2 UG/L
ENA AB SER QL IA: NEGATIVE

## 2024-03-23 NOTE — TELEPHONE ENCOUNTER
CALLED [facility] TO CHECK ON AUTHORIZATION. APPARENTLY THE CPT [id number] WAS NOT APPROVED AND CANCELLED BUT THEY APPROVED CPT: 17714Luis Armando #[id number] GOOD 2-7-20 THROUGH 11-3-20.   PT NOTIFIED NEW ORDER FOR THE AUTHORIZED ULTRASOUND IS IN THE SYSTEM AND SHE CAN
PER [employer], [employer] WILL BE SENDING AUTH WITH A NEW CASE NUMBER BECAUSE HE WAS UNABLE TO COMPLETE IN THE CURRENT CASE NUMBER. WE SHOULD RECEIVE AUTHORIZATION BY FAX TODAY.
Spoke with Dr. Christopher Smart who reviewed imaging. Reviewed sub-optimal views of the heart. Recommendation is for repeat US with heart views to make sure no issues. Discussed this during peer to peer. Plan for repeat US.
117

## 2024-08-29 ENCOUNTER — OFFICE VISIT (OUTPATIENT)
Dept: OBGYN CLINIC | Facility: CLINIC | Age: 34
End: 2024-08-29
Payer: MEDICAID

## 2024-08-29 VITALS
WEIGHT: 165.63 LBS | BODY MASS INDEX: 29 KG/M2 | HEART RATE: 70 BPM | SYSTOLIC BLOOD PRESSURE: 114 MMHG | DIASTOLIC BLOOD PRESSURE: 75 MMHG

## 2024-08-29 DIAGNOSIS — L65.9 HAIR LOSS: ICD-10-CM

## 2024-08-29 DIAGNOSIS — N93.0 PCB (POST COITAL BLEEDING): Primary | ICD-10-CM

## 2024-08-29 PROCEDURE — 99213 OFFICE O/P EST LOW 20 MIN: CPT | Performed by: NURSE PRACTITIONER

## 2024-08-29 NOTE — PROGRESS NOTES
[facility]   Obstetrics and Gynecology    Celina Ariza is a 34 year old female  Patient's last menstrual period was 2024 (approximate).   Chief Complaint   Patient presents with    Gyn Problem     IRREGULAR BLEEDING AFTER IC   . She has noticed bleeding after intercourse for a few episodes since . Liquidy pink with wiping right after intercourse. Not every time after intercourse. Then will be brown spotting next morning.    Same partner.no abnormal ordor or itching.  Using nuvaring    On menses now.  Also concerned about hair loss and saw dr. Lee for this in February and has continued. Labs all wnl.    Pap:2023 NILM, negative human papillomavirus  2022 nilm  10/2020 NILM  Contraception: nuvaring    OBSTETRICS HISTORY:  OB History    Para Term  AB Living   5 4 4 0 1 4   SAB IAB Ectopic Multiple Live Births   0 0 0 0 4       GYNE HISTORY:  Menarche: 11 YEARS OLD (2024  3:03 PM)  Period Cycle (Days): Monthly (2024  3:03 PM)  Period Duration (Days): 7 days (2024  3:03 PM)  Period Flow: Moderate (2024  3:03 PM)  Use of Birth Control (if yes, specify type): Nuvaring (2024  3:03 PM)  Pap Date: 23 (2024  3:03 PM)  Pap Result Notes: 23 NEG HPV NEG (2024  3:03 PM)  Follow Up Recommendation: Annual 23 TRACEY (2024  1:43 PM)      History   Sexual Activity    Sexual activity: Not on file       MEDICAL HISTORY:  Past Medical History:    Anxiety    Chlamydia    Decorative tattoo       SOCIAL HISTORY:  Social History     Socioeconomic History    Marital status:      Spouse name: Not on file    Number of children: 4    Years of education: Not on file    Highest education level: Not on file   Occupational History    Not on file   Tobacco Use    Smoking status: Never    Smokeless tobacco: Never   Vaping Use    Vaping status: Never Used   Substance and Sexual Activity    Alcohol use: Not Currently    Drug use: Never    Sexual  activity: Not on file   Other Topics Concern     Service Not Asked    Blood Transfusions Not Asked    Caffeine Concern Yes     Comment: Coffee, 1 cup daily    Occupational Exposure Not Asked    Hobby Hazards Not Asked    Sleep Concern Not Asked    Stress Concern Not Asked    Weight Concern Not Asked    Special Diet Not Asked    Back Care Not Asked    Exercise Not Asked    Bike Helmet Not Asked    Seat Belt Not Asked    Self-Exams Not Asked   Social History Narrative    Not on file     Social Determinants of Health     Financial Resource Strain: Not on file   Food Insecurity: Not on file   Transportation Needs: Not on file   Physical Activity: Not on file   Stress: Not on file   Social Connections: Not on file   Housing Stability: Not on file       MEDICATIONS:    Current Outpatient Medications:     Etonogestrel-Ethinyl Estradiol (NUVARING) 0.12-0.015 MG/24HR Vaginal Ring, Place 1 ring for 3 weeks. Remove for 7 days. Then replace with new ring, Disp: 3 Ring, Rfl: 3    fluticasone propionate 50 MCG/ACT Nasal Suspension, 2 sprays by Nasal route in the morning and 2 sprays before bedtime., Disp: 16 g, Rfl: 3    loratadine-pseudoephedrine ER  MG Oral Tablet 24 Hr, Take 1 tablet by mouth at bedtime., Disp: 30 tablet, Rfl: 1    ALLERGIES:  No Known Allergies      Review of Systems:  Constitutional:  Denies fatigue, night sweats, hot flashes  Cardiovascular:  denies chest pain or palpitations  Respiratory:  denies shortness of breath  Gastrointestinal:  denies heartburn, abdominal pain, diarrhea or constipation  Genitourinary:  denies dysuria, incontinence, abnormal vaginal discharge, vaginal itching see HPI  Musculoskeletal:  denies back pain.  Skin/Breast:  Denies any breast pain, lumps, or discharge.   Neurological:  denies headaches, extremity weakness or numbness.  Psychiatric: denies depression or anxiety.  Endocrine:   denies excessive thirst or urination. +hair loss  Heme/Lymph:  denies history of  anemia, easy bruising or bleeding.      PHYSICAL EXAM:     Vitals:    08/29/24 1506   BP: 114/75   Pulse: 70   Weight: 165 lb 9.6 oz (75.1 kg)     Body mass index is 29.33 kg/m².     Patient offered chaperone, patient declined    Constitutional: well developed, well nourished    Psychiatric:  Oriented to time, place, person and situation. Appropriate mood and affect    Pelvic Exam:  External Genitalia: normal appearance, hair distribution, and no lesions  Urethral Meatus:  normal in size, location, without lesions and prolapse  Bladder:  No fullness, masses or tenderness  Vagina: on menses, Normal appearance without lesions, no abnormal discharge  Cervix:  Normal without tenderness on motion  Uterus: normal in size, contour, position, mobility, without tenderness  Adnexa: normal without masses or tenderness  Perineum: normal  Anus: no hemorroids   Lymph node: no inguinal lymph nodes    Assessment & Plan:  Celina was seen today for gyn problem.    Diagnoses and all orders for this visit:    PCB (post coital bleeding)  -     Vaginitis Vaginosis PCR Panel; Future  -     Chlamydia/Gc Amplification; Future    Hair loss      Cultures done  If normal cultures consider pelvic ultrasound  Continue nuvaring    Hair loss - referral to dermatology    OMAR Amado    This note was prepared using Dragon Medical voice recognition dictation software. As a result errors may occur. When identified these errors have been corrected. While every attempt is made to correct errors during dictation discrepancies may still exist.

## 2024-08-30 ENCOUNTER — OFFICE VISIT (OUTPATIENT)
Dept: FAMILY MEDICINE CLINIC | Facility: CLINIC | Age: 34
End: 2024-08-30
Payer: MEDICAID

## 2024-08-30 ENCOUNTER — LAB ENCOUNTER (OUTPATIENT)
Dept: LAB | Age: 34
End: 2024-08-30
Attending: FAMILY MEDICINE
Payer: MEDICAID

## 2024-08-30 VITALS
SYSTOLIC BLOOD PRESSURE: 116 MMHG | HEART RATE: 65 BPM | HEIGHT: 63 IN | WEIGHT: 165 LBS | TEMPERATURE: 97 F | DIASTOLIC BLOOD PRESSURE: 74 MMHG | BODY MASS INDEX: 29.23 KG/M2

## 2024-08-30 DIAGNOSIS — L65.9 HAIR LOSS DISORDER: ICD-10-CM

## 2024-08-30 DIAGNOSIS — R19.5 CHANGE IN STOOL CALIBER: ICD-10-CM

## 2024-08-30 DIAGNOSIS — R19.8 ALTERNATING CONSTIPATION AND DIARRHEA: ICD-10-CM

## 2024-08-30 DIAGNOSIS — R19.8 ALTERNATING CONSTIPATION AND DIARRHEA: Primary | ICD-10-CM

## 2024-08-30 LAB
ALBUMIN SERPL-MCNC: 4.4 G/DL (ref 3.2–4.8)
ALBUMIN/GLOB SERPL: 1.4 {RATIO} (ref 1–2)
ALP LIVER SERPL-CCNC: 81 U/L
ALT SERPL-CCNC: 15 U/L
ANION GAP SERPL CALC-SCNC: 8 MMOL/L (ref 0–18)
AST SERPL-CCNC: 24 U/L (ref ?–34)
BASOPHILS # BLD AUTO: 0.04 X10(3) UL (ref 0–0.2)
BASOPHILS NFR BLD AUTO: 0.5 %
BILIRUB SERPL-MCNC: 0.5 MG/DL (ref 0.3–1.2)
BUN BLD-MCNC: 14 MG/DL (ref 9–23)
BUN/CREAT SERPL: 18.2 (ref 10–20)
BV BACTERIA DNA VAG QL NAA+PROBE: NEGATIVE
C GLABRATA DNA VAG QL NAA+PROBE: NEGATIVE
C KRUSEI DNA VAG QL NAA+PROBE: NEGATIVE
C TRACH DNA SPEC QL NAA+PROBE: NEGATIVE
CALCIUM BLD-MCNC: 9.3 MG/DL (ref 8.7–10.4)
CANDIDA DNA VAG QL NAA+PROBE: NEGATIVE
CHLORIDE SERPL-SCNC: 107 MMOL/L (ref 98–112)
CO2 SERPL-SCNC: 26 MMOL/L (ref 21–32)
CREAT BLD-MCNC: 0.77 MG/DL
CRP SERPL-MCNC: <0.4 MG/DL (ref ?–1)
DEPRECATED HBV CORE AB SER IA-ACNC: 6.4 NG/ML
DEPRECATED RDW RBC AUTO: 40.9 FL (ref 35.1–46.3)
EGFRCR SERPLBLD CKD-EPI 2021: 104 ML/MIN/1.73M2 (ref 60–?)
EOSINOPHIL # BLD AUTO: 0.22 X10(3) UL (ref 0–0.7)
EOSINOPHIL NFR BLD AUTO: 2.8 %
ERYTHROCYTE [DISTWIDTH] IN BLOOD BY AUTOMATED COUNT: 13.3 % (ref 11–15)
ERYTHROCYTE [SEDIMENTATION RATE] IN BLOOD: 10 MM/HR
FASTING STATUS PATIENT QL REPORTED: NO
GLOBULIN PLAS-MCNC: 3.2 G/DL (ref 2–3.5)
GLUCOSE BLD-MCNC: 84 MG/DL (ref 70–99)
HCT VFR BLD AUTO: 40.3 %
HGB BLD-MCNC: 13.1 G/DL
IMM GRANULOCYTES # BLD AUTO: 0.02 X10(3) UL (ref 0–1)
IMM GRANULOCYTES NFR BLD: 0.3 %
IRON SATN MFR SERPL: 17 %
IRON SERPL-MCNC: 102 UG/DL
LYMPHOCYTES # BLD AUTO: 3.2 X10(3) UL (ref 1–4)
LYMPHOCYTES NFR BLD AUTO: 41.1 %
MCH RBC QN AUTO: 27.2 PG (ref 26–34)
MCHC RBC AUTO-ENTMCNC: 32.5 G/DL (ref 31–37)
MCV RBC AUTO: 83.6 FL
MONOCYTES # BLD AUTO: 0.66 X10(3) UL (ref 0.1–1)
MONOCYTES NFR BLD AUTO: 8.5 %
N GONORRHOEA DNA SPEC QL NAA+PROBE: NEGATIVE
NEUTROPHILS # BLD AUTO: 3.65 X10 (3) UL (ref 1.5–7.7)
NEUTROPHILS # BLD AUTO: 3.65 X10(3) UL (ref 1.5–7.7)
NEUTROPHILS NFR BLD AUTO: 46.8 %
OSMOLALITY SERPL CALC.SUM OF ELEC: 292 MOSM/KG (ref 275–295)
PLATELET # BLD AUTO: 255 10(3)UL (ref 150–450)
POTASSIUM SERPL-SCNC: 4.2 MMOL/L (ref 3.5–5.1)
PROT SERPL-MCNC: 7.6 G/DL (ref 5.7–8.2)
RBC # BLD AUTO: 4.82 X10(6)UL
SODIUM SERPL-SCNC: 141 MMOL/L (ref 136–145)
T VAGINALIS DNA VAG QL NAA+PROBE: NEGATIVE
TIBC SERPL-MCNC: 611 UG/DL (ref 250–425)
TRANSFERRIN SERPL-MCNC: 410 MG/DL (ref 250–380)
TSI SER-ACNC: 2.61 MIU/ML (ref 0.55–4.78)
VIT B12 SERPL-MCNC: 657 PG/ML (ref 211–911)
WBC # BLD AUTO: 7.8 X10(3) UL (ref 4–11)

## 2024-08-30 PROCEDURE — 84466 ASSAY OF TRANSFERRIN: CPT

## 2024-08-30 PROCEDURE — 36415 COLL VENOUS BLD VENIPUNCTURE: CPT

## 2024-08-30 PROCEDURE — 84443 ASSAY THYROID STIM HORMONE: CPT

## 2024-08-30 PROCEDURE — 85025 COMPLETE CBC W/AUTO DIFF WBC: CPT

## 2024-08-30 PROCEDURE — 80053 COMPREHEN METABOLIC PANEL: CPT

## 2024-08-30 PROCEDURE — 99214 OFFICE O/P EST MOD 30 MIN: CPT | Performed by: FAMILY MEDICINE

## 2024-08-30 PROCEDURE — 85652 RBC SED RATE AUTOMATED: CPT

## 2024-08-30 PROCEDURE — 82728 ASSAY OF FERRITIN: CPT

## 2024-08-30 PROCEDURE — 86140 C-REACTIVE PROTEIN: CPT

## 2024-08-30 PROCEDURE — 82607 VITAMIN B-12: CPT

## 2024-08-30 PROCEDURE — 83540 ASSAY OF IRON: CPT

## 2024-08-30 NOTE — PROGRESS NOTES
Patient ID: Celina Ariza is a 34 year old female.    HPI  Chief Complaint   Patient presents with    Hair/Scalp Problem     Hair loss    Constipation     X 3 months     She states for the last 2 months she has thinning hair.  She is pulling strands out of her hair when she brushes her hair or if she is in the shower.  She showed me a picture.  No specific area of hair loss but it is all over.    She states for the last 3 months she has been alternating between constipation and loose stools.  She is not sure why.  He has nothing to do with food.  She also states her stools have been thinner for the last 3 months.  She has no acid in her throat and she has no abdominal bloating.  She denies any heavy menses.      Wt Readings from Last 6 Encounters:   24 165 lb (74.8 kg)   24 165 lb 9.6 oz (75.1 kg)   24 161 lb (73 kg)   24 161 lb 6.4 oz (73.2 kg)   24 164 lb 3.2 oz (74.5 kg)   24 163 lb (73.9 kg)       BMI Readings from Last 6 Encounters:   24 29.23 kg/m²   24 29.33 kg/m²   24 28.52 kg/m²   24 28.59 kg/m²   24 29.09 kg/m²   24 28.87 kg/m²       BP Readings from Last 6 Encounters:   24 116/74   24 114/75   24 120/76   24 112/71   24 108/68   23 106/72         Review of Systems   Constitutional:  Negative for appetite change, fever and unexpected weight change.   Gastrointestinal:  Positive for constipation and diarrhea. Negative for blood in stool and vomiting.           Medical History:      Past Medical History:    Anxiety    Chlamydia    Decorative tattoo       Past Surgical History:   Procedure Laterality Date    Implantable breast prosthesis  2018      ,     Other surgical history  2018    Tummy tuck and fat transfer to buttocks          Current Outpatient Medications   Medication Sig Dispense Refill    Etonogestrel-Ethinyl Estradiol (NUVARING) 0.12-0.015 MG/24HR Vaginal Ring Place 1 ring  for 3 weeks. Remove for 7 days. Then replace with new ring 3 Ring 3     Allergies:No Known Allergies     Physical Exam:       Physical Exam  Blood pressure 116/74, pulse 65, temperature 97.2 °F (36.2 °C), temperature source Temporal, height 5' 3\" (1.6 m), weight 165 lb (74.8 kg), last menstrual period 08/29/2024, not currently breastfeeding.  Physical Exam   Constitutional: Patient is oriented to person, place, and time. Patient appears well-developed and well-nourished.   HENT:   Mouth/Throat: Mucous membranes are normal.   Neck: Normal range of motion. Neck supple. No thyromegaly   Cardiovascular: Normal rate, regular rhythm and normal heart sounds.    Pulmonary/Chest: Effort normal and breath sounds normal. No respiratory distress.   Abdominal: Normal appearance and bowel sounds are normal. There is    no tenderness.  There is no rigidity, no rebound, no guarding and negative  Aguilera's sign.   Neurological: Patient is alert and oriented to person, place, and time.   Skin: Skin is warm and dry.  No alopecia areata.  Still a full thick scalp of hair .  Psychiatric: Patient has a normal mood and affect.   Vitals reviewed.           Assessment/Plan:      Diagnoses and all orders for this visit:    Alternating constipation and diarrhea  -     CBC With Differential With Platelet; Future  -     Comp Metabolic Panel (14); Future  -     Sed Rate, Westergren (Automated); Future  -     C-Reactive Protein; Future  -     Iron And Tibc; Future  -     Ferritin; Future  -     GASTRO - INTERNAL  No unintended weight loss.  Go ahead and do labs on her and then have her see gastroenterology as well as this has been going on for 3 months along with thinner stools.  Change in stool caliber  -     GASTRO - INTERNAL    Hair loss disorder  -     Assay, Thyroid Stim Hormone; Future  -     Vitamin B12; Future  -     Sed Rate, Westergren (Automated); Future  -     Iron And Tibc; Future  -     Ferritin; Future  Must check vitamins and she  agrees with plan.      Referrals (if applicable)  Orders Placed This Encounter   Procedures    GASTRO - INTERNAL     Gastroenterology Department phone number is [phone].  ++++ Ask for first available provider that can see you ++++.     Referral Priority:   Routine     Referral Type:   OFFICE VISIT     Requested Specialty:   GASTROENTEROLOGY     Number of Visits Requested:   3         Follow up if symptoms persist.  Take medicine (if given) as prescribed.  Approach to treatment discussed and patient/family member understands and agrees to plan.     No follow-ups on file.        Ramesh Lee DO  8/30/2024

## 2024-08-31 DIAGNOSIS — L65.9 HAIR LOSS DISORDER: Primary | ICD-10-CM

## 2024-10-18 ENCOUNTER — TELEPHONE (OUTPATIENT)
Dept: OBGYN CLINIC | Facility: CLINIC | Age: 34
End: 2024-10-18

## 2024-10-18 NOTE — TELEPHONE ENCOUNTER
Patient states she has discharge and now accompanied with blood, patient not due for menstrual cycle. Please advise

## 2024-10-18 NOTE — TELEPHONE ENCOUNTER
Celina calling with onset of white, clumpy discharge \"like white skin\" 2 days ago, with light itching. She denies pain or odor.   She has completed 2/3 days of Monistat 3, and itching is improving.   She notes little bit of red spotting yesterday and a little more today on wiping.     Advised may be due to applicator. Since symptoms improving and no pain, okay to complete monistat and let us know Monday if symptoms have not resolved.  Patient verbalized understanding.

## 2024-10-28 NOTE — H&P
[facility] - Gastroenterology                                                                                                               Reason for consult: eval    Requesting physician or provider: Ramesh Lee DO    Chief Complaint   Patient presents with    Consult     For change in bowel habits, constipated to loose bowels       HPI:   Celina Ariza is a 34 year old year-old female with history of chlamydia, anxiety:    she is here today for evaluation change in bowel habits  Sx since this summer.  She reports both constipation and soft stools.  Chronic h/o constipation - bm every 2-3 days.    Now may have bm 2-3x/day.  No urgency. No nocturnal bm.  Has straining and incomplete evacuation at times.   NO brbpr, melena.   Has cramping at times.    Onset w/o change in diet, activity, medication    she denies acid reflux and/or heartburn. she denies dysphagia, odynophagia and/or globus.  she denies nausea and/or vomiting.  she denies recent change in appetite and/or unintentional weight loss.    Labs 8/2024  Iron def  Hgb normal  B12, esr, crp, tsh,liver and renal function normal    She denies heavy periods    NSAIDS: no  Tobacco: no  Alcohol: no  Marijuana: no  Illicit drugs: no    No FDR w/  GI malignancy, IBD, celiac  Maternal uncle had gastric ca    No history of adverse reaction to sedation  No AGUSTO  No anticoagulants  No pacemaker/defibrillator  No pain medications and/or sleep aides    Last colonoscopy: no  Last EGD: no    Wt Readings from Last 6 Encounters:   10/29/24 167 lb 3.2 oz (75.8 kg)   08/30/24 165 lb (74.8 kg)   08/29/24 165 lb 9.6 oz (75.1 kg)   02/29/24 161 lb (73 kg)   02/26/24 161 lb 6.4 oz (73.2 kg)   01/04/24 164 lb 3.2 oz (74.5 kg)        History, Medications, Allergies, ROS:      Past Medical History:    Anxiety    Chlamydia    Decorative tattoo      Past Surgical History:   Procedure Laterality Date    Implantable breast prosthesis  12/2018       ,     Other surgical history  2018    Tummy tuck and fat transfer to buttocks      Family Hx:   Family History   Problem Relation Age of Onset    Diabetes Paternal Grandmother     Cancer Paternal Grandfather         per [employer]:  \"??Cancer?? (cause of death)\"    Cancer Maternal Uncle         Cancer, stomach    Breast Cancer Maternal Grandmother       Social History:   Social History     Socioeconomic History    Marital status:     Number of children: 4   Tobacco Use    Smoking status: Never    Smokeless tobacco: Never   Vaping Use    Vaping status: Never Used   Substance and Sexual Activity    Alcohol use: Not Currently    Drug use: Never   Other Topics Concern    Caffeine Concern Yes     Comment: Coffee, 1 cup daily        Medications (Active prior to today's visit):  Current Outpatient Medications   Medication Sig Dispense Refill    PEG 3350-KCl-Na Bicarb-NaCl (TRILYTE) 420 g Oral Recon Soln Take prep as directed by gastro office. May substitute with Trilyte/generic equivalent if needed. 4000 mL 0    Etonogestrel-Ethinyl Estradiol (NUVARING) 0.12-0.015 MG/24HR Vaginal Ring Place 1 ring for 3 weeks. Remove for 7 days. Then replace with new ring 3 Ring 3       Allergies:  Allergies[1]    ROS:   CONSTITUTIONAL: negative for fevers, chills, sweats and weight loss  EYES Negative for red eyes, yellow eyes, changes in vision  HEENT: Negative for dysphagia and hoarseness  RESPIRATORY: Negative for cough and shortness of breath  CARDIOVASCULAR: Negative for chest pain, palpitations  GASTROINTESTINAL: See HPI  GENITOURINARY: Negative for dysuria and frequency  MUSCULOSKELETAL: Negative for arthralgias and myalgias  NEUROLOGICAL: Negative for dizziness and headaches  BEHAVIOR/PSYCH: Negative for anxiety and poor appetite    PHYSICAL EXAM:   Blood pressure 124/77, pulse 76, height 5' 3\" (1.6 m), weight 167 lb 3.2 oz (75.8 kg), last menstrual period 2024, not currently breastfeeding.    GEN:  WD/WN, NAD  HEENT: Supple symmetrical, trachea midline  CV: RRR, the extremities are warm and well perfused   LUNGS: No increased work of breathing  ABDOMEN: No scars, normal bowel sounds, soft, non-tender, non-distended no rebound or guarding, no masses, no hepatomegaly  MSK: No redness, no warmth, no swelling of joints  SKIN: No jaundice, no erythema, no rashes  HEMATOLOGIC: No bleeding, no bruising  NEURO: Alert and interactive, normal gait    Labs/Imaging/Procedures:     Patient's pertinent labs and imaging were reviewed and discussed with patient today.        .  ASSESSMENT/PLAN:   Celina Ariza is a 34 year old year-old female with history of chlamydia, anxiety:    #trini  #change in bowel habits  She reports chronic constipation w/ new onset loose stools over the summer. No inciting event. No brbpr, melena. Has had abd cramping w/ sx.   Labs 8/2024. Iron def. Not anemic. B12 normal. Crp, esr, tsh normal. She denies heavy periods. No fdr w/ gi malignancy. Maternal uncle had gastric ca. No fhx IBD, celiac. Has not had cln and/or egd and think reasonable for w/U anemia.     -hpylori testing  -fibercon or citrucel  -squatty potty    1. Schedule colonoscopy/egd with MAC w/ General pool MD [Diagnosis:trini, change in bowel habits ]    2.  bowel prep from pharmacy (split trilyte)    3.   For cardiology patients and patients on blood thinners:  Please contact your cardiology clinic for clearance to proceed with the endoscopic procedure. If you are on blood thinners, please also confirm with your cardiologic clinic that you are able to hold the blood thinner per our recommendations.\"    BLOOD THINNER ORDERS:  -Hold for 48 hours (Xarelto, Eliquis, Pradaxa, Savaysa)  -Hold for 3 days (Pletal)  -Hold for 5 days (Coumadin, Plavix, Brilinta, Aggrenox)  -Hold for 7 days (Effient)     For endocrinology insulin patients:    Please contact your endocrinology clinic for insulin adjustment orders prior to your endoscopic  procedure.    4. Read all bowel prep instructions carefully    5. AVOID seeds, nuts, popcorn, raw fruits and vegetables (cooked is okay) for 2-3 days before procedure    6.   If you start any NEW medication after your visit today, please notify us. Certain medications will need to be held before the procedure, or the procedure cannot be performed.     >>>Please note: if you were prescribed Suprep for the bowel prep and it is too expensive or not covered by insurance, it is okay to substitute Trilyte (or any similar generic prep). This can be done by notifying the pharmacy or calling our office.      Orders This Visit:  Orders Placed This Encounter   Procedures    Helicobacter Pylori Breath Test, Adult       Meds This Visit:  Requested Prescriptions     Signed Prescriptions Disp Refills    PEG 3350-KCl-Na Bicarb-NaCl (TRILYTE) 420 g Oral Recon Soln 4000 mL 0     Sig: Take prep as directed by gastro office. May substitute with Trilyte/generic equivalent if needed.       Imaging & Referrals:  None    ENDOSCOPIC RISK BENEFIT DISCUSSION: I described the procedure in great detail with the patient. I discussed the risks and benefits, including but not limited to: bleeding, perforation, infection, anesthesia complications, and even death. Patient will be NPO after midnight and will have a person physically present at time of pick-up to drive patient home. Patient verbalized understanding and agrees to proceed with procedure as planned.    Keira Ambrosio, APRN   10/29/2024        This note was partially prepared using Dragon Medical voice recognition dictation software. As a result, errors may occur. When identified, these errors have been corrected. While every attempt is made to correct errors during dictation, discrepancies may still exist.          [1] No Known Allergies

## 2024-10-29 ENCOUNTER — TELEPHONE (OUTPATIENT)
Facility: CLINIC | Age: 34
End: 2024-10-29

## 2024-10-29 ENCOUNTER — OFFICE VISIT (OUTPATIENT)
Facility: CLINIC | Age: 34
End: 2024-10-29
Payer: MEDICAID

## 2024-10-29 VITALS
HEIGHT: 63 IN | HEART RATE: 76 BPM | WEIGHT: 167.19 LBS | BODY MASS INDEX: 29.62 KG/M2 | DIASTOLIC BLOOD PRESSURE: 77 MMHG | SYSTOLIC BLOOD PRESSURE: 124 MMHG

## 2024-10-29 DIAGNOSIS — D50.9 IRON DEFICIENCY ANEMIA, UNSPECIFIED IRON DEFICIENCY ANEMIA TYPE: Primary | ICD-10-CM

## 2024-10-29 DIAGNOSIS — R19.4 CHANGE IN BOWEL HABITS: ICD-10-CM

## 2024-10-29 PROCEDURE — 99244 OFF/OP CNSLTJ NEW/EST MOD 40: CPT | Performed by: NURSE PRACTITIONER

## 2024-10-29 RX ORDER — POLYETHYLENE GLYCOL 3350, SODIUM CHLORIDE, SODIUM BICARBONATE, POTASSIUM CHLORIDE 420; 11.2; 5.72; 1.48 G/4L; G/4L; G/4L; G/4L
POWDER, FOR SOLUTION ORAL
Qty: 4000 ML | Refills: 0 | Status: SHIPPED | OUTPATIENT
Start: 2024-10-29

## 2024-10-29 NOTE — PATIENT INSTRUCTIONS
-hpylori testing  -fibercon or citrucel  -squatty potty    1. Schedule colonoscopy/egd with MAC w/ General pool MD [Diagnosis:trini, change in bowel habits ]    2.  bowel prep from pharmacy (split trilyte)    3.   For cardiology patients and patients on blood thinners:  Please contact your cardiology clinic for clearance to proceed with the endoscopic procedure. If you are on blood thinners, please also confirm with your cardiologic clinic that you are able to hold the blood thinner per our recommendations.\"    BLOOD THINNER ORDERS:  -Hold for 48 hours (Xarelto, Eliquis, Pradaxa, Savaysa)  -Hold for 3 days (Pletal)  -Hold for 5 days (Coumadin, Plavix, Brilinta, Aggrenox)  -Hold for 7 days (Effient)     For endocrinology insulin patients:    Please contact your endocrinology clinic for insulin adjustment orders prior to your endoscopic procedure.    4. Read all bowel prep instructions carefully    5. AVOID seeds, nuts, popcorn, raw fruits and vegetables (cooked is okay) for 2-3 days before procedure    6.   If you start any NEW medication after your visit today, please notify us. Certain medications will need to be held before the procedure, or the procedure cannot be performed.     >>>Please note: if you were prescribed Suprep for the bowel prep and it is too expensive or not covered by insurance, it is okay to substitute Trilyte (or any similar generic prep). This can be done by notifying the pharmacy or calling our office.

## 2024-10-29 NOTE — TELEPHONE ENCOUNTER
Scheduled for:  Colonoscopy 59773, EGD 68830  Provider Name:  Dr. Sanchez   Date:  2/5/2025  Location:[facility]  Sedation:  MAC  Time: (pt is aware that ENDO will call the day before to confirm arrival time)    Prep:  Trilyte  Meds/Allergies Reconciled?:  OMAR Lazcano Reviewed  Diagnosis with codes:    Iron deficiency anemia D50.9  Change in bowel habits  R19.4  Was patient informed to call insurance with codes (Y/N):  Yes  Referral sent?:  Referral was sent at the time of electronic surgical scheduling.  EM or [facility] notified?:  I sent an electronic request to Endo Scheduling and received a confirmation today.  Medication Orders:  Pt is aware to NOT take iron pills, herbal meds and diet supplements for 7 days before exam. Also to NOT take any form of alcohol, recreational drugs and any forms of ED meds 24 hours before exam.   Misc Orders:       Further instructions given by staff:  I provide prep instructions to patient at the time of the appointment and reviewed date, time and location, she verbalized that she understood and is aware to call if she has any questions.

## 2024-11-05 ENCOUNTER — LAB ENCOUNTER (OUTPATIENT)
Dept: LAB | Age: 34
End: 2024-11-05
Attending: PHYSICIAN ASSISTANT
Payer: MEDICAID

## 2024-11-05 ENCOUNTER — OFFICE VISIT (OUTPATIENT)
Dept: DERMATOLOGY CLINIC | Facility: CLINIC | Age: 34
End: 2024-11-05
Payer: MEDICAID

## 2024-11-05 VITALS — WEIGHT: 167 LBS | BODY MASS INDEX: 29.59 KG/M2 | HEIGHT: 63 IN

## 2024-11-05 DIAGNOSIS — L65.0 TELOGEN EFFLUVIUM: ICD-10-CM

## 2024-11-05 DIAGNOSIS — L65.0 TELOGEN EFFLUVIUM: Primary | ICD-10-CM

## 2024-11-05 LAB
DHEA-S SERPL-MCNC: 56.2 UG/DL
VIT D+METAB SERPL-MCNC: 32.3 NG/ML (ref 30–100)

## 2024-11-05 PROCEDURE — 82627 DEHYDROEPIANDROSTERONE: CPT

## 2024-11-05 PROCEDURE — 84410 TESTOSTERONE BIOAVAILABLE: CPT

## 2024-11-05 PROCEDURE — 36415 COLL VENOUS BLD VENIPUNCTURE: CPT

## 2024-11-05 PROCEDURE — 99203 OFFICE O/P NEW LOW 30 MIN: CPT | Performed by: PHYSICIAN ASSISTANT

## 2024-11-05 PROCEDURE — 82306 VITAMIN D 25 HYDROXY: CPT

## 2024-11-05 NOTE — PROGRESS NOTES
HPI:    Patient ID: Celina Ariza is a 34 year old female.    Patient presents for hair loss for the past 1 year. Has noted hair loss all over the scalp. More so on the front of the scalp. No bald spots. Will loose clumps. No tight hair styles. Using native shampoo. No new products. No hx of alopecia or autoimmune disease. Does have some stress in her life. Labs show low ferritin. Has been trying to take multivitamins. No hair pulling or scratching. No draining or tenderness noted. No allergies to medications noted. Hx of thyroid issues in her mother. Washes her hair about 3-4 days a week. Taking iron 3 times a week and she started about 1 month ago.         Review of Systems   Constitutional:  Negative for chills and fever.   Musculoskeletal:  Negative for arthralgias and myalgias.   Skin:  Positive for rash. Negative for color change and wound.            Current Outpatient Medications   Medication Sig Dispense Refill    PEG 3350-KCl-Na Bicarb-NaCl (TRILYTE) 420 g Oral Recon Soln Take prep as directed by gastro office. May substitute with Trilyte/generic equivalent if needed. (Patient not taking: Reported on 11/5/2024) 4000 mL 0    Etonogestrel-Ethinyl Estradiol (NUVARING) 0.12-0.015 MG/24HR Vaginal Ring Place 1 ring for 3 weeks. Remove for 7 days. Then replace with new ring (Patient not taking: Reported on 11/5/2024) 3 Ring 3     Allergies:Allergies[1]   Ht 63\"   Wt 167 lb (75.8 kg)   LMP 08/29/2024 (Approximate)   BMI 29.58 kg/m²   Body mass index is 29.58 kg/m².  PHYSICAL EXAM:   Physical Exam  Constitutional:       General: She is not in acute distress.     Appearance: Normal appearance.   Skin:     General: Skin is warm and dry.      Findings: Rash present.      Comments: Global thinning noted on the scalp, but more so on the frontal scalp. No scaling noted. No drianing or tendenress noted. Follicles are present. No erythema noted.    Neurological:      Mental Status: She is alert and oriented to person,  place, and time.                ASSESSMENT/PLAN:   1. Telogen effluvium  -After discussion with patient, advised the following:  -Check labs  -Ferritin is low.   -Should continue with iron and may take 3 months to increase.   -Went over treatment options.   -Can possibly start clobetasol based on labs.   -Most likely will resolve spontaneously with time   -Can take 6 months to 1 year to improve.   -To call or follow-up with worsening symptoms or concerns  -Patient was agreeable to plan and will comply with discussion above.     - Vitamin D; Future  - Testosterone,Total and Weakly Bound w/ SHBG; Future  - Dehydroepiandrosterone Sulfate; Future      Orders Placed This Encounter   Procedures    Vitamin D    Testosterone,Total and Weakly Bound w/ SHBG    Dehydroepiandrosterone Sulfate       Meds This Visit:  Requested Prescriptions      No prescriptions requested or ordered in this encounter       Imaging & Referrals:  None         ID#[id number]       [1] No Known Allergies

## 2024-11-12 LAB
SEX HORM BIND GLOB: 88.2 NMOL/L
TESTOST % FREE+WEAK BND: 8.5 %
TESTOST FREE+WEAK BND: 1.9 NG/DL
TESTOSTERONE TOT /MS: 22.7 NG/DL

## 2024-11-13 ENCOUNTER — TELEPHONE (OUTPATIENT)
Dept: DERMATOLOGY CLINIC | Facility: CLINIC | Age: 34
End: 2024-11-13

## 2024-12-12 ENCOUNTER — HOSPITAL ENCOUNTER (OUTPATIENT)
Age: 34
Discharge: HOME | End: 2024-12-12
Payer: MEDICAID

## 2024-12-12 ENCOUNTER — TELEPHONE (OUTPATIENT)
Dept: OBGYN CLINIC | Facility: CLINIC | Age: 34
End: 2024-12-12

## 2024-12-12 VITALS
RESPIRATION RATE: 18 BRPM | OXYGEN SATURATION: 100 % | HEART RATE: 74 BPM | DIASTOLIC BLOOD PRESSURE: 62 MMHG | TEMPERATURE: 98 F | SYSTOLIC BLOOD PRESSURE: 117 MMHG

## 2024-12-12 DIAGNOSIS — N76.0 BACTERIAL VAGINOSIS: ICD-10-CM

## 2024-12-12 DIAGNOSIS — B96.89 BACTERIAL VAGINOSIS: ICD-10-CM

## 2024-12-12 DIAGNOSIS — N89.8 VAGINAL DISCHARGE: Primary | ICD-10-CM

## 2024-12-12 DIAGNOSIS — A59.9 TRICHOMONAS INFECTION: ICD-10-CM

## 2024-12-12 LAB
B-HCG UR QL: NEGATIVE
BILIRUB UR QL STRIP: NEGATIVE
COLOR UR: YELLOW
GLUCOSE UR STRIP-MCNC: NEGATIVE MG/DL
KETONES UR STRIP-MCNC: NEGATIVE MG/DL
NITRITE UR QL STRIP: NEGATIVE
PH UR STRIP: 7 [PH]
PROT UR STRIP-MCNC: NEGATIVE MG/DL
SP GR UR STRIP: 1.02
UROBILINOGEN UR STRIP-ACNC: <2 MG/DL

## 2024-12-12 PROCEDURE — 87591 N.GONORRHOEAE DNA AMP PROB: CPT | Performed by: PHYSICIAN ASSISTANT

## 2024-12-12 PROCEDURE — 87491 CHLMYD TRACH DNA AMP PROBE: CPT | Performed by: PHYSICIAN ASSISTANT

## 2024-12-12 PROCEDURE — 81514 NFCT DS BV&VAGINITIS DNA ALG: CPT | Performed by: PHYSICIAN ASSISTANT

## 2024-12-12 NOTE — TELEPHONE ENCOUNTER
Offered patient appointment 12/16/24 with Dr. Rios due to vaginal discharge and odor. Patient requesting appointments after 4:00pm. Patient declined appointment patient aware next available 12/26/24 at 6:00 patient accepts appointment. Patient aware if she feels she needs to be seen sooner that she should make appointment with PCP or to be seen in Urgent care. Patient expressed understanding.

## 2024-12-12 NOTE — TELEPHONE ENCOUNTER
Patient's appointment today needed to be rescheduled due to Rody needing to be out of office. None of the other available upcoming appointment times work for the patient. Please call to discuss.

## 2024-12-13 LAB
BV BACTERIA DNA VAG QL NAA+PROBE: POSITIVE
C GLABRATA DNA VAG QL NAA+PROBE: NEGATIVE
C KRUSEI DNA VAG QL NAA+PROBE: NEGATIVE
C TRACH DNA SPEC QL NAA+PROBE: NEGATIVE
CANDIDA DNA VAG QL NAA+PROBE: NEGATIVE
N GONORRHOEA DNA SPEC QL NAA+PROBE: NEGATIVE
T VAGINALIS DNA VAG QL NAA+PROBE: POSITIVE

## 2024-12-13 RX ORDER — METRONIDAZOLE 500 MG/1
500 TABLET ORAL 2 TIMES DAILY
Qty: 14 TABLET | Refills: 0 | Status: SHIPPED | OUTPATIENT
Start: 2024-12-13 | End: 2024-12-20

## 2024-12-13 NOTE — ED PROVIDER NOTES
ED Course as of 12/13/24 1326  ------------------------------------------------------------  Time: 12/13 1308  Value: Gardnerella vaginitis result(!): Positive  Comment: (Reviewed)  ------------------------------------------------------------  Time: 12/13 1308  Value: ALINA(MAYANK) TRICHOMONAS VAGINALIS RNA, QL, TMA(!): Positive  Comment: (Reviewed)       Patient is positive for vaginitis and trichomonas.  Flagyl was sent to her pharmacy.  I attempted to call the patient but no answer, I did leave a voicemail for her to call the clinic back.

## 2024-12-13 NOTE — ED PROVIDER NOTES
Patient Seen in: [facility]      History     Chief Complaint   Patient presents with    Vaginal Discharge     Stated Complaint: Eval-G    Subjective:   HPI    34-year-old female presenting to the immediate care for evaluation of vaginal discharge.  Patient states she has been experiencing discharge for the last 3 to 4 weeks.  Came to the immediate care today as she has noticed an odor.  She is sexually active and would like to be tested for STI.  Patient denies abdominal pain, back pain.  She has no urinary complaints.  Additionally, she has been experiencing spotting since she stopped her NuvaRing 3 months ago.  No concern for pregnancy    Objective:     Past Medical History:    Anxiety    Chlamydia    Decorative tattoo              Past Surgical History:   Procedure Laterality Date    Implantable breast prosthesis  2018      ,     Other surgical history  2018    Tummy tuck and fat transfer to buttocks                Social History     Socioeconomic History    Marital status:     Number of children: 4   Tobacco Use    Smoking status: Never    Smokeless tobacco: Never   Vaping Use    Vaping status: Never Used   Substance and Sexual Activity    Alcohol use: Not Currently    Drug use: Never   Other Topics Concern    Caffeine Concern Yes     Comment: Coffee, 1 cup daily    Reaction to local anesthetic No    Pt has a pacemaker No    Pt has a defibrillator No              Review of Systems    Positive for stated complaint: Eval-G  Other systems are as noted in HPI.  Constitutional and vital signs reviewed.      All other systems reviewed and negative except as noted above.    Physical Exam     ED Triage Vitals [24 1856]   /62   Pulse 74   Resp 18   Temp 97.8 °F (36.6 °C)   Temp src Oral   SpO2 100 %   O2 Device None (Room air)       Current Vitals:   Vital Signs  BP: 117/62  Pulse: 74  Resp: 18  Temp: 97.8 °F (36.6 °C)  Temp src: Oral    Oxygen Therapy  SpO2: 100 %  O2  Device: None (Room air)        Physical Exam  Vitals and nursing note reviewed.   Constitutional:       General: She is not in acute distress.  HENT:      Head: Normocephalic and atraumatic.      Right Ear: External ear normal.      Left Ear: External ear normal.      Nose: Nose normal.      Mouth/Throat:      Mouth: Mucous membranes are moist.   Eyes:      Extraocular Movements: Extraocular movements intact.      Pupils: Pupils are equal, round, and reactive to light.   Cardiovascular:      Rate and Rhythm: Normal rate.   Pulmonary:      Effort: Pulmonary effort is normal.   Abdominal:      General: Abdomen is flat.   Genitourinary:     Cervix: No cervical motion tenderness, discharge or friability.      Adnexa: Right adnexa normal and left adnexa normal.   Musculoskeletal:         General: Normal range of motion.      Cervical back: Normal range of motion.   Skin:     General: Skin is warm.   Neurological:      General: No focal deficit present.      Mental Status: She is alert and oriented to person, place, and time.   Psychiatric:         Mood and Affect: Mood normal.         Behavior: Behavior normal.            ED Course     Labs Reviewed   H POCT URINALYSIS DIPSTICK - Abnormal; Notable for the following components:       Result Value    Urine Clarity Slightly cloudy (*)     Blood, Urine Moderate (*)     Leukocyte esterase urine Small (*)     All other components within normal limits   VAGINITIS VAGINOSIS PCR PANEL - Abnormal; Notable for the following components:    Bacterial Vaginosis Positive (*)     Trichomonas vaginalis Positive (*)     All other components within normal limits    Narrative:     This assay utilizes RT-PCR to detect the DNA of Gardnerella vaginalis, Lactobacillus spp. (L. crispatus and L. jensenii), Atopobium vaginae, Bacterial Vaginosis Associated Bacteria-2 (BVAB-2), and Megasphaera-1. An algorithm is used to determine if the targets detected represent a vaginal microbiome consistent  with bacterial vaginosis or normal vaginal teena.  FDA approval was based on data in the 18 years and over population.       POCT PREGNANCY URINE - Normal   CHLAMYDIA/GONOCOCCUS, CITLALI       ED Course as of 12/13/24 1436  ------------------------------------------------------------  Time: 12/13 1308  Value: Gardnerella vaginitis result(!): Positive  Comment: (Reviewed)  ------------------------------------------------------------  Time: 12/13 1308  Value: SURESWAB(R) TRICHOMONAS VAGINALIS RNA, QL, TMA(!): Positive  Comment: (Reviewed)   34-year-old female presenting from home for evaluation of vaginal discharge.  Patient well-appearing.  Scant vaginal discharge on pelvic exam.  No CMT, no friability and no adnexal tenderness.  Patient has no urinary symptoms.  UA slightly cloudy with moderate blood, small leukocyte.    Ddx-UTI, STI, vaginosis, yeast infection    Treatment deferred until with culture results are known.  Patient to refrain from intercourse until the results are known.  She is aware that if the test is positive, any partner will also require treatment    Gynecology follow-up advised for [facility]              Medical Decision Making      Disposition and Plan     Clinical Impression:  1. Vaginal discharge    2. Trichomonas infection    3. Bacterial vaginosis         Disposition:  Discharge  12/12/2024  7:37 pm    Follow-up:  Ramesh Lee DO  [address]  [facility] [address]  [phone]          Nadine Carias MD  [address] [place] [address] [id number] [phone]      gynecology follow up          Medications Prescribed:  Discharge Medication List as of 12/12/2024  7:38 PM              Supplementary Documentation:

## 2024-12-13 NOTE — DISCHARGE INSTRUCTIONS
The results of your screening will be available in the next 1-2 days.  If positive-you will require additional antibiotic treatment and any partner should be treated    Refrain from intercourse for the next 10 days if STI screening is negative

## 2024-12-26 ENCOUNTER — LAB ENCOUNTER (OUTPATIENT)
Dept: LAB | Age: 34
End: 2024-12-26
Attending: NURSE PRACTITIONER
Payer: MEDICAID

## 2024-12-26 ENCOUNTER — OFFICE VISIT (OUTPATIENT)
Dept: OBGYN CLINIC | Facility: CLINIC | Age: 34
End: 2024-12-26

## 2024-12-26 VITALS
HEART RATE: 73 BPM | BODY MASS INDEX: 29 KG/M2 | WEIGHT: 165.63 LBS | SYSTOLIC BLOOD PRESSURE: 109 MMHG | DIASTOLIC BLOOD PRESSURE: 73 MMHG

## 2024-12-26 DIAGNOSIS — N89.8 VAGINAL DISCHARGE: ICD-10-CM

## 2024-12-26 DIAGNOSIS — N92.0 INTERMENSTRUAL SPOTTING: Primary | ICD-10-CM

## 2024-12-26 DIAGNOSIS — D50.9 IRON DEFICIENCY ANEMIA, UNSPECIFIED IRON DEFICIENCY ANEMIA TYPE: ICD-10-CM

## 2024-12-26 PROCEDURE — 99395 PREV VISIT EST AGE 18-39: CPT | Performed by: NURSE PRACTITIONER

## 2024-12-26 PROCEDURE — 83013 H PYLORI (C-13) BREATH: CPT

## 2024-12-26 RX ORDER — FLUCONAZOLE 150 MG/1
150 TABLET ORAL AS DIRECTED
Qty: 2 TABLET | Refills: 0 | Status: SHIPPED | OUTPATIENT
Start: 2024-12-26

## 2024-12-26 NOTE — PROGRESS NOTES
[facility]   Obstetrics and Gynecology    Celina Ariza is a 34 year old female  Patient's last menstrual period was 2024 (approximate).   Chief Complaint   Patient presents with    Problem     IRREGULAR SPOTTING IN BETWEEN PERIODS// DRYNESS AND ITCHING AFTER ANTIBIOTICS     .   Last seen in August. She went to urgent care on  due to discharge and +odor. She had cultures and was treated for bacterial vaginosis & trichomonas in . She took pills. Then when finished antibiotics had a burning sensation from clitoris and to rectum and now itching. She tried left over nystatin cream.    She reports her partner was not treated for trichomonas cause his test was negative.    She reports spotting in between periods last several months even on nuvaring, lasts a day just with wiping randomly or related to intercourse. Stopped nuvaring in September as her partner got a vasectomy.    Pap:2023 NILM, negative HPV  Contraception: nuva ring    OBSTETRICS HISTORY:  OB History    Para Term  AB Living   5 4 4 0 1 4   SAB IAB Ectopic Multiple Live Births   0 0 0 0 4       GYNE HISTORY:  Menarche: 11 YEARS OLD (2024  5:51 PM)  Period Cycle (Days): Monthly (2024  5:51 PM)  Period Duration (Days): 7 days (2024  5:51 PM)  Period Flow: Moderate (2024  5:51 PM)  Use of Birth Control (if yes, specify type): Nuvaring (2024  5:51 PM)  Pap Date: 23 (2024  5:51 PM)  Pap Result Notes: 23 NEG HPV NEG (2024  5:51 PM)  Follow Up Recommendation: Annual 23 TRACEY (2024  1:43 PM)      History   Sexual Activity    Sexual activity: Not on file       MEDICAL HISTORY:  Past Medical History:    Anxiety    Chlamydia    Decorative tattoo       SOCIAL HISTORY:  Social History     Socioeconomic History    Marital status:      Spouse name: Not on file    Number of children: 4    Years of education: Not on file    Highest education level: Not on file    Occupational History    Not on file   Tobacco Use    Smoking status: Never    Smokeless tobacco: Never   Vaping Use    Vaping status: Never Used   Substance and Sexual Activity    Alcohol use: Not Currently    Drug use: Never    Sexual activity: Not on file   Other Topics Concern     Service Not Asked    Blood Transfusions Not Asked    Caffeine Concern Yes     Comment: Coffee, 1 cup daily    Occupational Exposure Not Asked    Hobby Hazards Not Asked    Sleep Concern Not Asked    Stress Concern Not Asked    Weight Concern Not Asked    Special Diet Not Asked    Back Care Not Asked    Exercise Not Asked    Bike Helmet Not Asked    Seat Belt Not Asked    Self-Exams Not Asked    Grew up on a farm Not Asked    History of tanning Not Asked    Outdoor occupation Not Asked    Breast feeding Not Asked    Reaction to local anesthetic No    Pt has a pacemaker No    Pt has a defibrillator No   Social History Narrative    Not on file     Social Drivers of Health     Financial Resource Strain: Not on file   Food Insecurity: Not on file   Transportation Needs: Not on file   Physical Activity: Not on file   Stress: Not on file   Social Connections: Not on file   Housing Stability: Not on file       MEDICATIONS:    Current Outpatient Medications:     fluconazole (DIFLUCAN) 150 MG Oral Tab, Take 1 tablet (150 mg total) by mouth As Directed. Take one tablet by mouth today, repeat one tablet by mouth in 3 days, Disp: 2 tablet, Rfl: 0    PEG 3350-KCl-Na Bicarb-NaCl (TRILYTE) 420 g Oral Recon Soln, Take prep as directed by gastro office. May substitute with Trilyte/generic equivalent if needed. (Patient not taking: Reported on 12/26/2024), Disp: 4000 mL, Rfl: 0    Etonogestrel-Ethinyl Estradiol (NUVARING) 0.12-0.015 MG/24HR Vaginal Ring, Place 1 ring for 3 weeks. Remove for 7 days. Then replace with new ring (Patient not taking: Reported on 12/26/2024), Disp: 3 Ring, Rfl: 3    ALLERGIES:  Allergies[1]      Review of  Systems:  Constitutional:  Denies fatigue, night sweats, hot flashes  Cardiovascular:  denies chest pain or palpitations  Respiratory:  denies shortness of breath  Gastrointestinal:  denies heartburn, abdominal pain, diarrhea or constipation  Genitourinary:  denies dysuria, incontinence, abnormal vaginal discharge, vaginal itching see HPI  Musculoskeletal:  denies back pain.  Skin/Breast:  Denies any breast pain, lumps, or discharge.   Neurological:  denies headaches, extremity weakness or numbness.  Psychiatric: denies depression or anxiety.  Endocrine:   denies excessive thirst or urination.  Heme/Lymph:  denies history of anemia, easy bruising or bleeding.      PHYSICAL EXAM:     Vitals:    12/26/24 1746   BP: 109/73   Pulse: 73   Weight: 165 lb 9.6 oz (75.1 kg)     Body mass index is 29.33 kg/m².     Patient offered chaperone, patient declined    Constitutional: well developed, well nourished    Psychiatric:  Oriented to time, place, person and situation. Appropriate mood and affect    Pelvic Exam:  External Genitalia: normal appearance, hair distribution, and no lesions  Urethral Meatus:  normal in size, location, without lesions and prolapse  Bladder:  No fullness, masses or tenderness  Vagina:  Normal appearance without lesions, +white abnormal discharge  Cervix:  Normal without tenderness on motion  Uterus: normal in size, contour, position, mobility, without tenderness  Adnexa: normal without masses or tenderness  Perineum: normal  Anus: no hemorroids   Lymph node: no inguinal lymph nodes    Assessment & Plan:  Celina was seen today for problem.    Diagnoses and all orders for this visit:    Intermenstrual spotting  -     US PELVIS W EV (CPT=76856/29405); Future    Vaginal discharge  -     fluconazole (DIFLUCAN) 150 MG Oral Tab; Take 1 tablet (150 mg total) by mouth As Directed. Take one tablet by mouth today, repeat one tablet by mouth in 3 days  -     Vaginitis Vaginosis PCR Panel; Future    Culture  done  Will treat for yeast on exam with fluconazole    For intermenstrual spotting - pelvic ultrasound ordered  Rto in 1 month for annual      OMAR Amado    This note was prepared using Dragon Medical voice recognition dictation software. As a result errors may occur. When identified these errors have been corrected. While every attempt is made to correct errors during dictation discrepancies may still exist.         [1] No Known Allergies

## 2024-12-27 LAB
BV BACTERIA DNA VAG QL NAA+PROBE: NEGATIVE
C GLABRATA DNA VAG QL NAA+PROBE: NEGATIVE
C KRUSEI DNA VAG QL NAA+PROBE: NEGATIVE
CANDIDA DNA VAG QL NAA+PROBE: POSITIVE
H PYLORI BREATH TEST: NEGATIVE
T VAGINALIS DNA VAG QL NAA+PROBE: NEGATIVE

## 2025-01-11 ENCOUNTER — OFFICE VISIT (OUTPATIENT)
Dept: FAMILY MEDICINE CLINIC | Facility: CLINIC | Age: 35
End: 2025-01-11
Payer: MEDICAID

## 2025-01-11 VITALS
SYSTOLIC BLOOD PRESSURE: 110 MMHG | BODY MASS INDEX: 29.09 KG/M2 | HEART RATE: 69 BPM | DIASTOLIC BLOOD PRESSURE: 71 MMHG | HEIGHT: 63 IN | WEIGHT: 164.19 LBS

## 2025-01-11 DIAGNOSIS — R30.0 DYSURIA: Primary | ICD-10-CM

## 2025-01-11 DIAGNOSIS — N76.1 SUBACUTE VAGINITIS: ICD-10-CM

## 2025-01-11 LAB
APPEARANCE: CLEAR
BILIRUBIN: NEGATIVE
GLUCOSE (URINE DIPSTICK): NEGATIVE MG/DL
KETONES (URINE DIPSTICK): NEGATIVE MG/DL
MULTISTIX LOT#: ABNORMAL NUMERIC
NITRITE, URINE: NEGATIVE
PH, URINE: 7 (ref 4.5–8)
PROTEIN (URINE DIPSTICK): 30 MG/DL
SPECIFIC GRAVITY: 1.02 (ref 1–1.03)
URINE-COLOR: YELLOW
UROBILINOGEN,SEMI-QN: 0.2 MG/DL (ref 0–1.9)

## 2025-01-11 PROCEDURE — 81003 URINALYSIS AUTO W/O SCOPE: CPT | Performed by: FAMILY MEDICINE

## 2025-01-11 PROCEDURE — 99213 OFFICE O/P EST LOW 20 MIN: CPT | Performed by: FAMILY MEDICINE

## 2025-01-11 RX ORDER — CIPROFLOXACIN 250 MG/1
250 TABLET, FILM COATED ORAL 2 TIMES DAILY
Qty: 6 TABLET | Refills: 0 | Status: SHIPPED | OUTPATIENT
Start: 2025-01-11 | End: 2025-01-14

## 2025-01-11 RX ORDER — FLUCONAZOLE 150 MG/1
150 TABLET ORAL DAILY
Qty: 3 TABLET | Refills: 0 | Status: SHIPPED | OUTPATIENT
Start: 2025-01-11

## 2025-01-11 NOTE — PROGRESS NOTES
2025  9:28 AM    Celina Ariza is a 34 year old female.    Chief complaint(s):   Chief Complaint   Patient presents with    UTI     Pelvic pain         HPI:     Celina Ariza primary complaint is regarding as above.     Patient to be evaluated for possible urinary tract infection.  Symptoms began within the last 1 week. Primary symptom is dysuria. Patient's last menstrual period was 2024 (approximate)..   Associated symptoms include;  negative polyuria, negative urgency, negative hematuria. Further pertinent symptoms include; negativesuprapubic pain, negative flank pain, negative fever, negative nausea and vomiting.   She associated abdominal pain,  no chills, or fever, no nausea,+ vaginal discharge. Patient denies any recent history of new sex partner.  Celina Ariza states that she  had few multiple prior episodes of similar discomfort, which were diagnosed as simple UTI.  Significant medical history is pertinent for none .  In addition patient is taking amoxicillin due to dental recent and prescribed by dentist.         HISTORY:  Past Medical History:    Anxiety    Chlamydia    Decorative tattoo      Past Surgical History:   Procedure Laterality Date    Implantable breast prosthesis  2018      ,     Other surgical history  2018    Tummy tuck and fat transfer to buttocks      Family History   Problem Relation Age of Onset    Diabetes Paternal Grandmother     Cancer Paternal Grandfather         per Next[employer]:  \"??Cancer?? (cause of death)\"    Cancer Maternal Uncle         Cancer, stomach    Breast Cancer Maternal Grandmother       Social History:   Social History     Socioeconomic History    Marital status:     Number of children: 4   Tobacco Use    Smoking status: Never    Smokeless tobacco: Never   Vaping Use    Vaping status: Never Used   Substance and Sexual Activity    Alcohol use: Not Currently    Drug use: Never   Other Topics Concern    Caffeine Concern Yes     Comment:  Coffee, 1 cup daily    Reaction to local anesthetic No    Pt has a pacemaker No    Pt has a defibrillator No     Social Drivers of Health     Food Insecurity: No Food Insecurity (1/11/2025)    NCSS - Food Insecurity     Worried About Running Out of Food in the Last Year: No     Ran Out of Food in the Last Year: No   Transportation Needs: No Transportation Needs (1/11/2025)    NCSS - Transportation     Lack of Transportation: No   Housing Stability: Not At Risk (1/11/2025)    NCSS - Housing/Utilities     Has Housing: Yes     Worried About Losing Housing: No     Unable to Get Utilities: No        Immunizations:   Immunization History   Administered Date(s) Administered    Covid-19 Vaccine Pfizer 30 mcg/0.3 ml 02/17/2021, 03/10/2021    Covid-19 Vaccine Pfizer Adebayo-Sucrose 30 mcg/0.3 ml 06/07/2022    DTP 12/17/1990, 02/18/1991, 04/18/1991, 08/17/1992, 09/21/1994    FLULAVAL 6 months & older 0.5 ml Prefilled syringe (31317) 12/16/2019, 11/30/2021, 12/27/2022    FLUZONE 6 months and older PFS 0.5 ml (37768) 12/16/2019, 11/30/2021    HEP B 08/16/2000, 09/19/2000, 12/19/2000    IPV 12/17/1990, 02/18/1991, 04/18/1991, 02/26/1994, 09/21/1994    MMR 08/16/2003, 07/09/2015    Measles 08/23/1991, 10/11/1996    RHO(D) Immune Globulin 10/19/2019, 02/26/2020, 05/18/2020, 09/11/2021, 01/15/2022, 04/07/2022    Rubella 08/04/1995    TDAP 07/09/2015, 11/08/2018    Varicella Deferred (Had Chicken Pox) 08/15/1990       Medications (Active prior to today's visit):  Current Outpatient Medications   Medication Sig Dispense Refill    ciprofloxacin 250 MG Oral Tab Take 1 tablet (250 mg total) by mouth 2 (two) times daily for 3 days. 6 tablet 0    fluconazole (DIFLUCAN) 150 MG Oral Tab Take 1 tablet (150 mg total) by mouth daily. 3 tablet 0    fluconazole (DIFLUCAN) 150 MG Oral Tab Take 1 tablet (150 mg total) by mouth As Directed. Take one tablet by mouth today, repeat one tablet by mouth in 3 days (Patient not taking: Reported on 1/11/2025)  2 tablet 0    PEG 3350-KCl-Na Bicarb-NaCl (TRILYTE) 420 g Oral Recon Soln Take prep as directed by gastro office. May substitute with Trilyte/generic equivalent if needed. (Patient not taking: Reported on 11/5/2024) 4000 mL 0       Allergies:  Allergies[1]      ROS:   Review of Systems   Constitutional:  Negative for appetite change and fever.   Eyes:  Negative for visual disturbance.   Respiratory:  Negative for shortness of breath.    Cardiovascular:  Negative for chest pain.   Gastrointestinal:  Negative for abdominal pain, nausea and vomiting.   Genitourinary:  Positive for dysuria, vaginal discharge and vaginal pain. Negative for flank pain, genital sores and hematuria.   Musculoskeletal:  Negative for back pain.   Skin:  Negative for rash.   Neurological:  Negative for dizziness and headaches.       PHYSICAL EXAM:   VS: /71   Pulse 69   Ht 5' 3\" (1.6 m)   Wt 164 lb 3.2 oz (74.5 kg)   LMP 12/13/2024 (Approximate)   BMI 29.09 kg/m²     Physical Exam  Vitals reviewed.   Constitutional:       General: She is not in acute distress.     Appearance: Normal appearance.   HENT:      Head: Normocephalic.   Eyes:      Conjunctiva/sclera: Conjunctivae normal.   Cardiovascular:      Rate and Rhythm: Normal rate.   Pulmonary:      Effort: Pulmonary effort is normal.   Genitourinary:     Vagina: Vaginal discharge (white) and erythema present.      Cervix: Erythema present.      Uterus: Normal.       Adnexa:         Right: No mass or tenderness.          Left: No mass or tenderness.     Musculoskeletal:      Cervical back: Neck supple.   Skin:     Findings: No rash.   Psychiatric:         Mood and Affect: Mood normal.         LABORATORY RESULTS:        Results for orders placed or performed in visit on 01/11/25   URINALYSIS, AUTO, W/O SCOPE    Collection Time: 01/11/25  9:29 AM   Result Value Ref Range    Glucose Urine Negative Negative mg/dL    Bilirubin Urine Negative Negative    Ketones, UA Negative Negative -  Trace mg/dL    Spec Gravity 1.020 1.005 - 1.030    Blood Urine Trace-intact (A) Negative    PH Urine 7.0 5.0 - 8.0    Protein Urine 30 (A) Negative - Trace mg/dL    Urobilinogen Urine 0.2 0.2 - 1.0 mg/dL    Nitrite Urine Negative Negative    Leukocyte Esterase Urine Small (A) Negative    APPEARANCE CLEAR Clear    Color Urine YELLOW Yellow    Multistix Lot# [id number] Numeric    Multistix Expiration Date 10/31/25 Date       EKG / Spirometry : -     Radiology: No results found.     ASSESSMENT/PLAN:   Assessment   Encounter Diagnoses   Name Primary?    Dysuria Yes    Subacute vaginitis        MEDICATIONS:     Requested Prescriptions     Signed Prescriptions Disp Refills    ciprofloxacin 250 MG Oral Tab 6 tablet 0     Sig: Take 1 tablet (250 mg total) by mouth 2 (two) times daily for 3 days.    fluconazole (DIFLUCAN) 150 MG Oral Tab 3 tablet 0     Sig: Take 1 tablet (150 mg total) by mouth daily.           LABORATORY & ORDERS:   Orders Placed This Encounter   Procedures    URINALYSIS, AUTO, W/O SCOPE    Urine Culture, Routine    Vaginitis Vaginosis PCR Panel    Chlamydia/Gc Amplification     RECOMMENDATIONS given include: Patient was reassured of  her medical condition and all questions and concerns were answered. Patient was informed to please, call our office with any new or further questions or concerns that may come up in the near future. Notify Dr Ji or the [facility] if there is a deterioration or worsening of the medical condition. Also, inform the doctor with any new symptoms or medications' side effects.      FOLLOW-UP: Schedule a follow-up visit in  prn.            Orders This Visit:  Orders Placed This Encounter   Procedures    URINALYSIS, AUTO, W/O SCOPE    Urine Culture, Routine    Vaginitis Vaginosis PCR Panel    Chlamydia/Gc Amplification       Meds This Visit:  Requested Prescriptions     Signed Prescriptions Disp Refills    ciprofloxacin 250 MG Oral Tab 6 tablet 0     Sig: Take 1 tablet (250 mg  total) by mouth 2 (two) times daily for 3 days.    fluconazole (DIFLUCAN) 150 MG Oral Tab 3 tablet 0     Sig: Take 1 tablet (150 mg total) by mouth daily.       Imaging & Referrals:  None         LEIDA RUAVLCABA MD       [1] No Known Allergies

## 2025-01-12 LAB
BV BACTERIA DNA VAG QL NAA+PROBE: NEGATIVE
C GLABRATA DNA VAG QL NAA+PROBE: NEGATIVE
C KRUSEI DNA VAG QL NAA+PROBE: NEGATIVE
CANDIDA DNA VAG QL NAA+PROBE: POSITIVE
T VAGINALIS DNA VAG QL NAA+PROBE: NEGATIVE

## 2025-01-13 LAB
C TRACH DNA SPEC QL NAA+PROBE: NEGATIVE
N GONORRHOEA DNA SPEC QL NAA+PROBE: NEGATIVE

## 2025-01-29 ENCOUNTER — HOSPITAL ENCOUNTER (OUTPATIENT)
Dept: ULTRASOUND IMAGING | Facility: HOSPITAL | Age: 35
Discharge: HOME | End: 2025-01-29
Attending: NURSE PRACTITIONER
Payer: MEDICAID

## 2025-01-29 DIAGNOSIS — N92.0 INTERMENSTRUAL SPOTTING: ICD-10-CM

## 2025-01-29 PROCEDURE — 76856 US EXAM PELVIC COMPLETE: CPT | Performed by: NURSE PRACTITIONER

## 2025-01-29 PROCEDURE — 76830 TRANSVAGINAL US NON-OB: CPT | Performed by: NURSE PRACTITIONER

## 2025-02-05 ENCOUNTER — ANESTHESIA (OUTPATIENT)
Dept: ENDOSCOPY | Age: 35
End: 2025-02-05
Payer: MEDICAID

## 2025-02-05 ENCOUNTER — ANESTHESIA EVENT (OUTPATIENT)
Dept: ENDOSCOPY | Age: 35
End: 2025-02-05
Payer: MEDICAID

## 2025-02-05 ENCOUNTER — HOSPITAL ENCOUNTER (OUTPATIENT)
Age: 35
Setting detail: HOSPITAL OUTPATIENT SURGERY
Discharge: HOME | End: 2025-02-05
Attending: INTERNAL MEDICINE | Admitting: INTERNAL MEDICINE
Payer: MEDICAID

## 2025-02-05 VITALS
SYSTOLIC BLOOD PRESSURE: 101 MMHG | DIASTOLIC BLOOD PRESSURE: 65 MMHG | BODY MASS INDEX: 29.59 KG/M2 | WEIGHT: 167 LBS | HEART RATE: 65 BPM | OXYGEN SATURATION: 100 % | RESPIRATION RATE: 17 BRPM | HEIGHT: 63 IN

## 2025-02-05 DIAGNOSIS — R19.4 CHANGE IN BOWEL HABITS: ICD-10-CM

## 2025-02-05 DIAGNOSIS — D50.9 IRON DEFICIENCY ANEMIA, UNSPECIFIED IRON DEFICIENCY ANEMIA TYPE: ICD-10-CM

## 2025-02-05 PROBLEM — K63.5 POLYP OF ASCENDING COLON: Status: ACTIVE | Noted: 2025-02-05

## 2025-02-05 PROBLEM — Z01.89 NORMAL ESOPHAGOGASTRODUODENOSCOPY (EGD): Status: ACTIVE | Noted: 2025-02-05

## 2025-02-05 LAB — B-HCG UR QL: NEGATIVE

## 2025-02-05 PROCEDURE — 43239 EGD BIOPSY SINGLE/MULTIPLE: CPT | Performed by: INTERNAL MEDICINE

## 2025-02-05 PROCEDURE — 45385 COLONOSCOPY W/LESION REMOVAL: CPT | Performed by: INTERNAL MEDICINE

## 2025-02-05 RX ORDER — SODIUM CHLORIDE, SODIUM LACTATE, POTASSIUM CHLORIDE, CALCIUM CHLORIDE 600; 310; 30; 20 MG/100ML; MG/100ML; MG/100ML; MG/100ML
INJECTION, SOLUTION INTRAVENOUS CONTINUOUS
Status: DISCONTINUED | OUTPATIENT
Start: 2025-02-05 | End: 2025-02-05

## 2025-02-05 RX ORDER — ONDANSETRON 2 MG/ML
4 INJECTION INTRAMUSCULAR; INTRAVENOUS EVERY 6 HOURS PRN
Status: DISCONTINUED | OUTPATIENT
Start: 2025-02-05 | End: 2025-02-05

## 2025-02-05 RX ORDER — NALOXONE HYDROCHLORIDE 0.4 MG/ML
80 INJECTION, SOLUTION INTRAMUSCULAR; INTRAVENOUS; SUBCUTANEOUS AS NEEDED
Status: DISCONTINUED | OUTPATIENT
Start: 2025-02-05 | End: 2025-02-05

## 2025-02-05 RX ADMIN — SODIUM CHLORIDE, SODIUM LACTATE, POTASSIUM CHLORIDE, CALCIUM CHLORIDE: 600; 310; 30; 20 INJECTION, SOLUTION INTRAVENOUS at 10:13:00

## 2025-02-05 RX ADMIN — SODIUM CHLORIDE, SODIUM LACTATE, POTASSIUM CHLORIDE, CALCIUM CHLORIDE: 600; 310; 30; 20 INJECTION, SOLUTION INTRAVENOUS at 10:41:00

## 2025-02-05 NOTE — ANESTHESIA POSTPROCEDURE EVALUATION
Patient: Celina Ariza    Procedure Summary       Date: 02/05/25 Room / Location: formerly [facility] ENDOSCOPY 01 / [facility] ENDO    Anesthesia Start: 1012 Anesthesia Stop: 1045    Procedures:       COLONOSCOPY with polypectomy      ESOPHAGOGASTRODUODENOSCOPY (EGD) with biopsy Diagnosis:       Iron deficiency anemia, unspecified iron deficiency anemia type      Change in bowel habits      (Normal egd Polyps diverticulosis hemorrhoids)    Surgeons: KEMAL Sanchez MD Anesthesiologist: Lisa Grissom MD    Anesthesia Type: MAC ASA Status: 1            Anesthesia Type: MAC    Vitals Value Taken Time   /60 02/05/25 1045   Temp  02/05/25 1045   Pulse 72 02/05/25 1045   Resp 15 02/05/25 1045   SpO2 100 % 02/05/25 1045       [facility] AN Post Evaluation:   Patient Evaluated in PACU  Patient Participation: complete - patient participated  Level of Consciousness: sleepy but conscious  Pain Management: adequate  Airway Patency:patent  Dental exam unchanged from preop  Yes    Nausea/Vomiting: none  Cardiovascular Status: acceptable and hemodynamically stable  Respiratory Status: acceptable, nasal cannula, nonlabored ventilation and spontaneous ventilation  Postoperative Hydration acceptable  Comments: Teeth noted to be intact after bite block removal.   Did not need to be orally suctioned by anesthesiologist at any point during procedure.         Lisa Grissom MD  2/5/2025 10:45 AM

## 2025-02-05 NOTE — ANESTHESIA PREPROCEDURE EVALUATION
Anesthesia PreOp Note    HPI:     Celina Ariza is a 34 year old female who presents for preoperative consultation requested by: KEMAL Sanchez MD    Date of Surgery: 2025    Procedure(s):  COLONOSCOPY \ ESOPHAGOGASTRODUODENOSCOPY  ESOPHAGOGASTRODUODENOSCOPY (EGD) with biopsy  Indication: Iron deficiency anemia, unspecified iron deficiency anemia type /Change in bowel habits    Relevant Problems   No relevant active problems       NPO:  Last Liquid Consumption Date: 25  Last Liquid Consumption Time: 900  Last Solid Consumption Date: 25  Last Solid Consumption Time: 08  Last Liquid Consumption Date: 25          History Review:  Patient Active Problem List    Diagnosis Date Noted    Normal labor ([facility]) 2022    BMI 31.0-31.9,adult 2021    Rh negative state in antepartum period ([facility]) 10/16/2019    Depression 2019    Anxiety 2019    Excess skin of abdominal wall 2018    Chronic bilateral low back pain without sciatica 2017       Past Medical History:    Anxiety    Chlamydia    Decorative tattoo       Past Surgical History:   Procedure Laterality Date    Implantable breast prosthesis  2018      ,     Other surgical history  2018    Tummy tuck and fat transfer to buttocks       Prescriptions Prior to Admission[1]  Current Medications and Prescriptions Ordered in Epic[2]    Allergies[3]    Family History   Problem Relation Age of Onset    Diabetes Paternal Grandmother     Cancer Paternal Grandfather         per [employer]:  \"??Cancer?? (cause of death)\"    Cancer Maternal Uncle         Cancer, stomach    Breast Cancer Maternal Grandmother      Social History     Socioeconomic History    Marital status:     Number of children: 4   Tobacco Use    Smoking status: Never    Smokeless tobacco: Never   Vaping Use    Vaping status: Never Used   Substance and Sexual Activity    Alcohol use: Not Currently    Drug use: Never   Other Topics Concern     Caffeine Concern Yes     Comment: Coffee, 1 cup daily    Reaction to local anesthetic No    Pt has a pacemaker No    Pt has a defibrillator No       Available pre-op labs reviewed.  Lab Results   Component Value Date    URINEPREG Negative 02/05/2025             Vital Signs:  Body mass index is 29.58 kg/m².   height is 1.6 m (5' 3\") and weight is 75.8 kg (167 lb).   Vitals:    01/28/25 1558 02/05/25 0912   Weight: 73.9 kg (163 lb) 75.8 kg (167 lb)   Height: 1.6 m (5' 3\") 1.6 m (5' 3\")        Anesthesia Evaluation     Patient summary reviewed and Nursing notes reviewed    No history of anesthetic complications   Airway   Mallampati: I  TM distance: >3 FB  Neck ROM: full  Dental - Dentition appears grossly intact     Pulmonary - negative ROS and normal exam     ROS comment: Denies smoking or marijuana use   Cardiovascular - normal exam  Exercise tolerance: good    ROS comment: Denies recent chest pain    Neuro/Psych    (+)  anxiety/panic attacks,  depression      GI/Hepatic/Renal    (+) bowel prep    Endo/Other - negative ROS   Abdominal  - normal exam                 Anesthesia Plan:   ASA:  1  Plan:   MAC  Post-op Pain Management: IV analgesics  Plan Comments: Urine pregnancy test negative today  Informed Consent Plan and Risks Discussed With:  Patient  Discussed plan with:  Surgeon      I have informed Celina Ariza and/or legal guardian or family member of the nature of the anesthetic plan, benefits, risks including possible dental damage if relevant, major complications, and any alternative forms of anesthetic management.   All of the patient's questions were answered to the best of my ability. The patient desires the anesthetic management as planned.  Lisa Grissom MD  2/5/2025 10:11 AM  Present on Admission:  **None**           [1]   Medications Prior to Admission   Medication Sig Dispense Refill Last Dose/Taking    PEG 3350-KCl-Na Bicarb-NaCl (TRILYTE) 420 g Oral Recon Soln Take prep as directed by gastro  office. May substitute with Trilyte/generic equivalent if needed. (Patient not taking: Reported on 11/5/2024) 4000 mL 0    [2]   Current Facility-Administered Medications Ordered in Epic   Medication Dose Route Frequency Provider Last Rate Last Admin    lactated ringers infusion   Intravenous Continuous KEMAL Sanchez MD         No current [facility]-ordered outpatient medications on file.   [3] No Known Allergies

## 2025-02-05 NOTE — H&P
History & Physical Examination    Patient Name: Celina Ariza  MRN: [id number]  CSN: [id number]  YOB: 1990    Diagnosis: iron def anemia, change inb owels    Prescriptions Prior to Admission[1]  Current Facility-Administered Medications   Medication Dose Route Frequency    lactated ringers infusion   Intravenous Continuous       Allergies: Allergies[2]    Past Medical History:    Anxiety    Chlamydia    Decorative tattoo     Past Surgical History:   Procedure Laterality Date    Implantable breast prosthesis  2018      ,     Other surgical history  2018    Tummy tuck and fat transfer to buttocks     Family History   Problem Relation Age of Onset    Diabetes Paternal Grandmother     Cancer Paternal Grandfather         per [employer]:  \"??Cancer?? (cause of death)\"    Cancer Maternal Uncle         Cancer, stomach    Breast Cancer Maternal Grandmother      Social History     Tobacco Use    Smoking status: Never    Smokeless tobacco: Never   Substance Use Topics    Alcohol use: Not Currently         SYSTEM Check if Review is Normal Check if Physical Exam is Normal If not normal, please explain:   HEENT [X ] [ X]    NECK  [X ] [ X]    HEART [X ] [ X]    LUNGS [X ] [ X]    ABDOMEN [X ] [ X]    EXTREMITIES [X ] [ X]    OTHER        I have discussed the risks and benefits and alternatives of the procedure with the patient/family.  They understand and agree to proceed with plan of care.   I have reviewed the History and Physical done within the last 30 days.  Any changes noted above.    VAISHALI Sanchez MD  [facility] - Gastroenterology  2025  10:11 AM               [1]   Medications Prior to Admission   Medication Sig Dispense Refill Last Dose/Taking    PEG 3350-KCl-Na Bicarb-NaCl (TRILYTE) 420 g Oral Recon Soln Take prep as directed by gastro office. May substitute with Trilyte/generic equivalent if needed. (Patient not taking: Reported on 2024) 4000 mL 0    [2] No Known  Allergies

## 2025-02-05 NOTE — DISCHARGE INSTRUCTIONS
Home Care Instructions for Colonoscopy and Gastroscopy with Sedation    Diet:  - Resume your regular diet as tolerated unless otherwise instructed.  - Start with light meals to minimize bloating.  - Do not drink alcohol today.    Medication:  - If you have questions about resuming your normal medications, please contact your Primary Care Physician.    Activities:  - Take it easy today. Do not return to work today.  - Do not drive today.  - Do not operate any machinery today (including kitchen equipment).    Colonoscopy:  - You may notice some rectal \"spotting\" (a little blood on the toilet tissue) for a day or two after the exam. This is normal.  - If you experience any rectal bleeding (not spotting), persistent tenderness or sharp severe abdominal pains, oral temperature over 100 degrees Fahrenheit, light-headedness or dizziness, or any other problems, contact your doctor.    Gastroscopy:  - You may have a sore throat for 2-3 days following the exam. This is normal. Gargling with warm salt water (1/2 tsp salt to 1 glass warm water) or using throat lozenges will help.  - If you experience any sharp pain in your neck, abdomen or chest, vomiting of blood, oral temperature over 100 degrees Fahrenheit, light-headedness or dizziness, or any other problems, contact your doctor.    **If unable to reach your doctor, please go to the [facility] Emergency Room**    - Your referring physician will receive a full report of your examination.  - If you do not hear from your doctor's office within two weeks of your biopsy, please call them for your results.    You may be able to see your laboratory results in [employer] between 4 and 7 business days.  In some cases, your physician may not have viewed the results before they are released to [employer].  If you have questions regarding your results contact the physician who ordered the test/exam by phone or via [employer] by choosing \"Ask a Medical Question.\"

## 2025-02-06 ENCOUNTER — OFFICE VISIT (OUTPATIENT)
Dept: OBGYN CLINIC | Facility: CLINIC | Age: 35
End: 2025-02-06
Payer: MEDICAID

## 2025-02-06 VITALS
DIASTOLIC BLOOD PRESSURE: 71 MMHG | HEART RATE: 64 BPM | HEIGHT: 63 IN | SYSTOLIC BLOOD PRESSURE: 109 MMHG | BODY MASS INDEX: 29.02 KG/M2 | WEIGHT: 163.81 LBS

## 2025-02-06 DIAGNOSIS — Z01.419 WELL WOMAN EXAM WITH ROUTINE GYNECOLOGICAL EXAM: Primary | ICD-10-CM

## 2025-02-06 PROCEDURE — 99395 PREV VISIT EST AGE 18-39: CPT | Performed by: NURSE PRACTITIONER

## 2025-02-06 NOTE — PROGRESS NOTES
[facility]    Obstetrics and Gynecology    Chief Complaint   Patient presents with    Gyn Exam     ANNUAL EXAM        Celina Ariza is a 34 year old female  Patient's last menstrual period was 2025 (approximate). presenting for annual gynecology exam.  Last seen  for intermenstrual spotting. Treated for bacterial vaginosis. Ultrasound from 2025 reviewed - normal uterus, possible small 3 mm calcification in myometrium, normal ovaries    No intermenstrual spotting this month. Treated for yeast infection on , negative STI testing.    Frustrated with lack of weight loss. Exercising regularly and healthy diet.  Pap:2023 NILM, negative HPV  Contraception:vasectomy  Mammo: n/a    OBSTETRICS HISTORY:  OB History    Para Term  AB Living   5 4 4 0 1 4   SAB IAB Ectopic Multiple Live Births   0 0 0 0 4       GYNE HISTORY:  Menarche: 11 YEARS OLD (2025  5:45 PM)  Period Cycle (Days): Monthly (2025  5:45 PM)  Period Duration (Days): 7 days (2025  5:45 PM)  Period Flow: Moderate (2025  5:45 PM)  Use of Birth Control (if yes, specify type): None (2025  5:45 PM)  Pap Date: 23 (2025  5:45 PM)  Pap Result Notes: 23 NEG HPV NEG (2025  5:45 PM)      History   Sexual Activity    Sexual activity: Yes     Comment: NONE           Latest Ref Rng & Units 2023     4:11 PM 10/15/2020     4:45 PM 10/16/2019     9:42 AM   RECENT PAP RESULTS   INTERPRETATION/RESULT: Negative for intraepithelial lesion or malignancy Negative for intraepithelial lesion or malignancy  Negative for intraepithelial lesion or malignancy  Low grade squamous intraepithelial lesion (LSIL) HPV/Mild dysplasia/MALACHI I    HPV Negative Negative  Negative           MEDICAL HISTORY:  Past Medical History:    Anxiety    Chlamydia    Decorative tattoo     Past Surgical History:   Procedure Laterality Date    Colonoscopy N/A 2025    Procedure: COLONOSCOPY with polypectomy;  Surgeon:  KEMAL Sanchez MD;  Location: [facility] ENDO    Implantable breast prosthesis  2018      ,     Other surgical history  2018    Tummy tuck and fat transfer to buttocks       SOCIAL HISTORY:  Social History     Socioeconomic History    Marital status:      Spouse name: Not on file    Number of children: 4    Years of education: Not on file    Highest education level: Not on file   Occupational History    Not on file   Tobacco Use    Smoking status: Never    Smokeless tobacco: Never   Vaping Use    Vaping status: Never Used   Substance and Sexual Activity    Alcohol use: Not Currently    Drug use: Never    Sexual activity: Yes     Comment: NONE   Other Topics Concern     Service Not Asked    Blood Transfusions Not Asked    Caffeine Concern Yes     Comment: Coffee, 1 cup daily    Occupational Exposure Not Asked    Hobby Hazards Not Asked    Sleep Concern Not Asked    Stress Concern Not Asked    Weight Concern Not Asked    Special Diet Not Asked    Back Care Not Asked    Exercise Not Asked    Bike Helmet Not Asked    Seat Belt Not Asked    Self-Exams Not Asked    Grew up on a farm Not Asked    History of tanning Not Asked    Outdoor occupation Not Asked    Breast feeding Not Asked    Reaction to local anesthetic No    Pt has a pacemaker No    Pt has a defibrillator No   Social History Narrative    Not on file     Social Drivers of Health     Food Insecurity: No Food Insecurity (2025)    NCSS - Food Insecurity     Worried About Running Out of Food in the Last Year: No     Ran Out of Food in the Last Year: No   Transportation Needs: No Transportation Needs (2025)    NCSS - Transportation     Lack of Transportation: No   Stress: Not on file   Housing Stability: Not At Risk (2025)    NCSS - Housing/Utilities     Has Housing: Yes     Worried About Losing Housing: No     Unable to Get Utilities: No         Depression Screening (PHQ-2/PHQ-9): Over the LAST 2 WEEKS   Little interest  or pleasure in doing things: More than half the days    Feeling down, depressed, or hopeless: More than half the days    PHQ-2 SCORE: 4   1. Little interest or pleasure in doing things: More than half the days  2. Feeling down, depressed, or hopeless: More than half the days  3. Trouble falling or staying asleep, or sleeping too much: Not at all  4. Feeling tired or having little energy: Not at all  5. Poor appetite or overeating: Not at all  6. Feeling bad about yourself - or that you are a failure or have let yourself or your family down: Not at all  7. Trouble concentrating on things, such as reading the newspaper or watching television: Not at all  8. Moving or speaking so slowly that other people could have noticed. Or the opposite - being so fidgety or restless that you have been moving around a lot more than usual: Not at all  9. Thoughts that you would be better off dead, or of hurting yourself in some way: Not at all  PHQ-9 TOTAL SCORE: 4  If you checked off any problems, how difficult have these problems made it for you to do your work, take care of things at home, or get along with other people?: Not difficult at all         FAMILY HISTORY:  Family History   Problem Relation Age of Onset    Diabetes Paternal Grandmother     Cancer Paternal Grandfather         per [employer]:  \"??Cancer?? (cause of death)\"    Cancer Maternal Uncle         Cancer, stomach    Breast Cancer Maternal Grandmother        MEDICATIONS:  No current outpatient medications on file.    ALLERGIES:  Allergies[1]      Review of Systems:  Constitutional:  Denies fatigue, night sweats, hot flashes  Eyes:  denies blurred or double vision  Cardiovascular:  denies chest pain or palpitations  Respiratory:  denies shortness of breath  Gastrointestinal:  denies heartburn, abdominal pain, diarrhea or constipation  Genitourinary:  denies dysuria, incontinence, abnormal vaginal discharge, vaginal itching,   Musculoskeletal:  denies back pain    Skin/Breast:  Denies any breast pain, lumps, or discharge.   Neurological:  denies headaches, extremity weakness or numbness.  Psychiatric: denies depression or anxiety.  Endocrine:   denies excessive thirst or urination.  Heme/Lymph:  denies history of anemia, easy bruising or bleeding.      PHYSICAL EXAM:     Vitals:    02/06/25 1747   BP: 109/71   Pulse: 64   Weight: 163 lb 12.8 oz (74.3 kg)   Height: 5' 3\" (1.6 m)       Body mass index is 29.02 kg/m².     Patient offered chaperone, patient declined    Constitutional: well developed, well nourished  Psychiatric:  Oriented to time, place, person and situation. Appropriate mood and affect  Head/Face: normocephalic  Neck/Thyroid: thyroid symmetric, no thyromegaly, no nodules, no adenopathy  Lymphatic:no abnormal supraclavicular or axillary adenopathy is noted  Breast: normal without palpable masses, tenderness, asymmetry, nipple discharge, nipple retraction or skin changes  Abdomen:  soft, nontender, nondistended, no masses  Skin/Hair: no unusual rashes or bruises  Extremities: no edema, no cyanosis    Pelvic Exam:  External Genitalia: normal appearance, hair distribution, and no lesions  Urethral Meatus:  normal in size, location, without lesions and prolapse  Bladder:  No fullness, masses or tenderness  Vagina:  Normal appearance without lesions, no abnormal discharge  Cervix:  Normal without tenderness on motion  Uterus: normal in size, contour, position, mobility, without tenderness  Adnexa: normal without masses or tenderness  Perineum: normal  Anus: no hemorroids     Assessment & Plan:    ICD-10-CM    1. Well woman exam with routine gynecological exam  Z01.419          Reviewed ASCCP guidelines with the patient   Pap due in 2026  Contraception: Using vasectomy  Intermenstrual bleeding - reviewed ultrasound. No abnormal bleeding this month. If re occurs consider evaluation with MD for endosee  Breast Health:     Reviewed current guidelines with the patient  and to start Mammograms at age 40  Reviewed monthly self breast exams with the patient   Discussed diet, exercise, MVIs with Ca/Vit D  Follow up in 1 yr for OMAR Santana    This note was prepared using Dragon Medical voice recognition dictation software. As a result errors may occur. When identified these errors have been corrected. While every attempt is made to correct errors during dictation discrepancies may still exist.         [1] No Known Allergies

## 2025-02-21 ENCOUNTER — TELEPHONE (OUTPATIENT)
Facility: CLINIC | Age: 35
End: 2025-02-21

## 2025-02-21 NOTE — TELEPHONE ENCOUNTER
Results letter mailed to patient per   Colon recall entered for repeat in 3 yrs, 2/5/2028  Colon done in 2/5/2025  HM Updated and Patient Outreach was placed for Colon recall   KEMAL Sanchez MD  [facility] Clinical Staff  GI staff: please place recall for colonoscopy in 3 years

## 2025-03-16 ENCOUNTER — OFFICE VISIT (OUTPATIENT)
Dept: FAMILY MEDICINE CLINIC | Facility: CLINIC | Age: 35
End: 2025-03-16
Payer: MEDICAID

## 2025-03-16 VITALS
DIASTOLIC BLOOD PRESSURE: 74 MMHG | WEIGHT: 167.38 LBS | TEMPERATURE: 98 F | RESPIRATION RATE: 12 BRPM | BODY MASS INDEX: 30 KG/M2 | HEART RATE: 73 BPM | SYSTOLIC BLOOD PRESSURE: 111 MMHG

## 2025-03-16 DIAGNOSIS — N89.8 VAGINAL DISCHARGE: Primary | ICD-10-CM

## 2025-03-16 PROCEDURE — 99214 OFFICE O/P EST MOD 30 MIN: CPT | Performed by: NURSE PRACTITIONER

## 2025-03-16 NOTE — PROGRESS NOTES
HPI    Patient presents for vaginal discharge with foul/fishy smell for the past few weeks.      Review of Systems   Genitourinary:  Positive for vaginal discharge.   All other systems reviewed and are negative.       Vitals:    25 1313   BP: 111/74   Pulse: 73   Resp: 12   Temp: 97.8 °F (36.6 °C)   TempSrc: Tympanic   Weight: 167 lb 6.4 oz (75.9 kg)     Body mass index is 29.65 kg/m².    Health Maintenance   Topic Date Due    Annual Physical  2023    COVID-19 Vaccine ( season) 2024    Influenza Vaccine (1) 10/01/2024    Pap Smear  2026    Colorectal Cancer Screening  2028    DTaP,Tdap,and Td Vaccines (8 - Td or Tdap) 2028    Annual Depression Screening  Completed    Pneumococcal Vaccine: Birth to 50yrs  Aged Out    Meningococcal B Vaccine  Aged Out       Past Medical History:    Anxiety    Chlamydia    Decorative tattoo       .  Past Surgical History:   Procedure Laterality Date    Colonoscopy N/A 2025    Procedure: COLONOSCOPY with polypectomy;  Surgeon: KEMAL Sanchez MD;  Location: [facility] ENDO    Implantable breast prosthesis  2018      ,     Other surgical history  2018    Tummy tuck and fat transfer to buttocks       Family History   Problem Relation Age of Onset    Diabetes Paternal Grandmother     Cancer Paternal Grandfather         per [employer]:  \"??Cancer?? (cause of death)\"    Cancer Maternal Uncle         Cancer, stomach    Breast Cancer Maternal Grandmother        Social History     Socioeconomic History    Marital status:      Spouse name: Not on file    Number of children: 4    Years of education: Not on file    Highest education level: Not on file   Occupational History    Not on file   Tobacco Use    Smoking status: Never    Smokeless tobacco: Never   Vaping Use    Vaping status: Never Used   Substance and Sexual Activity    Alcohol use: Not Currently    Drug use: Never    Sexual activity: Yes     Comment: NONE   Other Topics  Concern     Service Not Asked    Blood Transfusions Not Asked    Caffeine Concern Yes     Comment: Coffee, 1 cup daily    Occupational Exposure Not Asked    Hobby Hazards Not Asked    Sleep Concern Not Asked    Stress Concern Not Asked    Weight Concern Not Asked    Special Diet Not Asked    Back Care Not Asked    Exercise Not Asked    Bike Helmet Not Asked    Seat Belt Not Asked    Self-Exams Not Asked    Grew up on a farm Not Asked    History of tanning Not Asked    Outdoor occupation Not Asked    Breast feeding Not Asked    Reaction to local anesthetic No    Pt has a pacemaker No    Pt has a defibrillator No   Social History Narrative    Not on file     Social Drivers of Health     Food Insecurity: No Food Insecurity (1/11/2025)    NCSS - Food Insecurity     Worried About Running Out of Food in the Last Year: No     Ran Out of Food in the Last Year: No   Transportation Needs: No Transportation Needs (1/11/2025)    NCSS - Transportation     Lack of Transportation: No   Stress: Not on file   Housing Stability: Not At Risk (1/11/2025)    NCSS - Housing/Utilities     Has Housing: Yes     Worried About Losing Housing: No     Unable to Get Utilities: No       No current outpatient medications on file.       Allergies:  Allergies[1]    Physical Exam  Vitals and nursing note reviewed.   Constitutional:       Appearance: Normal appearance.   Pulmonary:      Effort: No respiratory distress.   Genitourinary:     Vagina: Vaginal discharge present.   Neurological:      Mental Status: She is alert and oriented to person, place, and time.          Assessment and Plan:   Problem List Items Addressed This Visit    None  Visit Diagnoses       Vaginal discharge    -  Primary    Relevant Orders    Vaginitis Vaginosis PCR Panel           Vaginosis panel collected today.  Will treat pending results.    Discussed plan of care with patient and patient is in agreement.  All questions answered. Patient to call with questions or  concerns.    Encouraged to sign up for My Chart if not already registered.        [1] No Known Allergies

## 2025-03-17 DIAGNOSIS — B96.89 BV (BACTERIAL VAGINOSIS): Primary | ICD-10-CM

## 2025-03-17 DIAGNOSIS — N76.0 BV (BACTERIAL VAGINOSIS): Primary | ICD-10-CM

## 2025-03-17 LAB
BV BACTERIA DNA VAG QL NAA+PROBE: POSITIVE
C GLABRATA DNA VAG QL NAA+PROBE: NEGATIVE
C KRUSEI DNA VAG QL NAA+PROBE: NEGATIVE
CANDIDA DNA VAG QL NAA+PROBE: NEGATIVE
T VAGINALIS DNA VAG QL NAA+PROBE: NEGATIVE

## 2025-03-17 RX ORDER — METRONIDAZOLE 500 MG/1
500 TABLET ORAL 2 TIMES DAILY
Qty: 14 TABLET | Refills: 0 | Status: SHIPPED | OUTPATIENT
Start: 2025-03-17 | End: 2025-03-24

## 2025-04-04 ENCOUNTER — PATIENT MESSAGE (OUTPATIENT)
Dept: FAMILY MEDICINE CLINIC | Facility: CLINIC | Age: 35
End: 2025-04-04

## 2025-04-04 ENCOUNTER — NURSE TRIAGE (OUTPATIENT)
Dept: FAMILY MEDICINE CLINIC | Facility: CLINIC | Age: 35
End: 2025-04-04

## 2025-04-04 DIAGNOSIS — N89.8 VAGINAL DISCHARGE: Primary | ICD-10-CM

## 2025-04-04 RX ORDER — FLUCONAZOLE 200 MG/1
200 TABLET ORAL
Qty: 2 TABLET | Refills: 0 | Status: SHIPPED | OUTPATIENT
Start: 2025-04-04

## 2025-04-04 NOTE — TELEPHONE ENCOUNTER
Action Requested: Summary for Provider     []  Critical Lab, Recommendations Needed  [] Need Additional Advice  []   FYI    []   Need Orders  [x] Need Medications Sent to Pharmacy  []  Other     SUMMARY:  for Maria Teresa James Please see patient Ecrebohart message below. Patient did call the office and she stated that she strongly feels she has a yeast infection. She only feels the burning sensation on her vaginal area. She has no problems when urinating. Patient will like a medication to help with her  yeast infection to be sent to the pharmacy. Please advise.    RN ok to sent patient a Ecrebohart message with Dr. Carrington reply. Thank you     Celina Ariza to P [facility] Rn Triage (supporting Maria Teresa James, APRN)         4/4/25  7:22 AM  Good morning,     After finishing up the treatment for BV, I think I developed a yeast infection. I have discharge that looks like cottage cheese. My vaginal part is itchy and has a burning sensation.   Can you please help me     Reason for call: Yeast infection  Onset: Few days ago        Reason for Disposition   Symptoms of a yeast infection (i.e., itchy, white discharge, not bad smelling) and not improved > 3 days following Care Advice    Protocols used: Vaginal Symptoms-A-OH

## 2025-04-04 NOTE — TELEPHONE ENCOUNTER
Patient contacted and made aware of Rx sent, reviewed Rx in it's entirety. Patient verbalized understanding. No further questions or concerns at this time.

## 2025-04-04 NOTE — TELEPHONE ENCOUNTER
1. Vaginal discharge    - fluconazole 200 MG Oral Tab; Take 1 tablet (200 mg total) by mouth every third day.  Dispense: 2 tablet; Refill: 0

## 2025-05-14 ENCOUNTER — APPOINTMENT (OUTPATIENT)
Dept: GENERAL RADIOLOGY | Age: 35
End: 2025-05-14
Attending: NURSE PRACTITIONER
Payer: MEDICAID

## 2025-05-14 ENCOUNTER — HOSPITAL ENCOUNTER (OUTPATIENT)
Age: 35
Discharge: HOME | End: 2025-05-14
Payer: MEDICAID

## 2025-05-14 VITALS
TEMPERATURE: 98 F | RESPIRATION RATE: 18 BRPM | SYSTOLIC BLOOD PRESSURE: 107 MMHG | OXYGEN SATURATION: 100 % | HEART RATE: 64 BPM | DIASTOLIC BLOOD PRESSURE: 66 MMHG

## 2025-05-14 DIAGNOSIS — S93.509A SPRAIN OF TOE, INITIAL ENCOUNTER: Primary | ICD-10-CM

## 2025-05-14 DIAGNOSIS — S99.929A TOE INJURY: ICD-10-CM

## 2025-05-14 PROCEDURE — 99213 OFFICE O/P EST LOW 20 MIN: CPT | Performed by: NURSE PRACTITIONER

## 2025-05-14 PROCEDURE — 73660 X-RAY EXAM OF TOE(S): CPT | Performed by: NURSE PRACTITIONER

## 2025-05-14 NOTE — DISCHARGE INSTRUCTIONS
As discussed, there is no fracture seen on x-ray.  Likely toe sprain and contusion.  Recommend buddy tape and postop shoe for at least 1 week.  RICE therapy: Rest, ice, compression, elevation.  Tylenol every 4 hours and Motrin every 6 hours as needed for pain alleviation.    Wear supportive footwear at all times.  I would avoid any open toed shoes for the next 2 weeks to avoid further trauma.    If no improvement of pain in the next 2 weeks, please follow-up with your primary care doctor

## 2025-05-14 NOTE — ED INITIAL ASSESSMENT (HPI)
Pt was playing with her kids yesterday and rolled on her foot in the grass.  Pt complaining the right 4th toe pain with bruising.

## 2025-05-14 NOTE — ED PROVIDER NOTES
Patient Seen in: [facility]      History     Chief Complaint   Patient presents with    Toe Injury     Stated Complaint: Foot Pain    Subjective: This is a 34-year-old female with past medical history of anxiety, presents to immediate care clinic for evaluation of injury to fourth toe of right foot.  Patient states she was playing with her kids yesterday outside on the grass and likely stubbed her toe.  She is on certain what.  She reports pain only with weightbearing and ambulation.  She has not taken any Tylenol, Motrin, ice application for her pain.  Positive bruising without any obvious asymmetry or deformity.  Patient denies previous injury to toe or foot.  Amatory in immediate care clinic with steady gait.  AO x 4  The history is provided by the patient.     History of Present Illness                  Objective:     Past Medical History:    Anxiety    Chlamydia    Decorative tattoo              Past Surgical History:   Procedure Laterality Date    Colonoscopy N/A 2025    Procedure: COLONOSCOPY with polypectomy;  Surgeon: KEMAL Sanchez MD;  Location: [facility]    Implantable breast prosthesis  2018      ,     Other surgical history  2018    Tummy tuck and fat transfer to buttocks                Social History     Socioeconomic History    Marital status:     Number of children: 4   Tobacco Use    Smoking status: Never    Smokeless tobacco: Never   Vaping Use    Vaping status: Never Used   Substance and Sexual Activity    Alcohol use: Not Currently    Drug use: Never    Sexual activity: Yes     Comment: NONE   Other Topics Concern    Caffeine Concern Yes     Comment: Coffee, 1 cup daily    Reaction to local anesthetic No    Pt has a pacemaker No    Pt has a defibrillator No     Social Drivers of Health     Food Insecurity: No Food Insecurity (2025)    NCSS - Food Insecurity     Worried About Running Out of Food in the Last Year: No     Ran Out of Food in the Last  Year: No   Transportation Needs: No Transportation Needs (1/11/2025)    NCSS - Transportation     Lack of Transportation: No   Housing Stability: Not At Risk (1/11/2025)    NCSS - Housing/Utilities     Has Housing: Yes     Worried About Losing Housing: No     Unable to Get Utilities: No              Review of Systems   Respiratory: Negative.     Cardiovascular: Negative.    Musculoskeletal:  Positive for arthralgias and joint swelling.   Skin:  Positive for color change. Negative for pallor, rash and wound.   Neurological: Negative.        Positive for stated complaint: Foot Pain  Other systems are as noted in HPI.  Constitutional and vital signs reviewed.      All other systems reviewed and negative except as noted above.                  Physical Exam     ED Triage Vitals [05/14/25 0805]   /66   Pulse 64   Resp 18   Temp 98.2 °F (36.8 °C)   Temp src Oral   SpO2 100 %   O2 Device None (Room air)       Current Vitals:   Vital Signs  BP: 107/66  Pulse: 64  Resp: 18  Temp: 98.2 °F (36.8 °C)  Temp src: Oral    Oxygen Therapy  SpO2: 100 %  O2 Device: None (Room air)          Physical Exam  Constitutional:       General: She is not in acute distress.     Appearance: Normal appearance. She is not ill-appearing.   Cardiovascular:      Rate and Rhythm: Normal rate.      Pulses: Normal pulses.   Pulmonary:      Effort: Pulmonary effort is normal.   Musculoskeletal:         General: Swelling, tenderness and signs of injury present. Normal range of motion.        Feet:    Skin:     General: Skin is warm.      Capillary Refill: Capillary refill takes less than 2 seconds.      Findings: Bruising present.   Neurological:      General: No focal deficit present.      Mental Status: She is alert and oriented to person, place, and time.                   ED Course   Labs Reviewed - No data to display       Results          XR TOE(S) (MIN 2 VIEWS), RIGHT 4TH (CPT=73660)  Result Date: 5/14/2025  CONCLUSION: No acute fracture or  dislocation.    Dictated by (CST): Jose Maria Walters MD on 5/14/2025 at 8:28 AM     Finalized by (CST): Jose Maria Walters MD on 5/14/2025 at 8:30 AM                              MDM      Differentials considered include: Toe sprain, toe contusion, toe fracture, toe subluxation    Patient is nontender to metacarpals.  There is no evidence of soft tissue swelling, erythema, warmth, ecchymosis, abrasions to dorsal aspect of foot.  X-ray only of fourth toe.    No subungual hematoma.    Patient does have pain, swelling of fourth digit.  No subungual hematoma.    X-ray obtained.  X-ray independently reviewed by provider.  No acute fracture, subluxation, dislocation.  Negative read by radiologist.    Patient is aware of toe sprain and contusion.  She had fatmata tape and postop shoe applied.  She is aware of RICE therapy.    Strongly encourage patient to follow-up with primary care doctor if no improvement in 2 weeks.  She is aware of supportive and symptomatic care at home.        Medical Decision Making  Amount and/or Complexity of Data Reviewed  Radiology: ordered and independent interpretation performed. Decision-making details documented in ED Course.          Disposition and Plan     Clinical Impression:  1. Sprain of toe, initial encounter    2. Toe injury         Disposition:  Discharge  5/14/2025  8:39 am    Follow-up:  Ramesh Lee DO  [address] [place] [id number]  [phone]    In 2 weeks  If symptoms worsen          Medications Prescribed:  Discharge Medication List as of 5/14/2025  8:53 AM                Supplementary Documentation:

## 2025-05-20 ENCOUNTER — TELEPHONE (OUTPATIENT)
Dept: OBGYN CLINIC | Facility: CLINIC | Age: 35
End: 2025-05-20

## 2025-05-20 NOTE — TELEPHONE ENCOUNTER
Patient reports recurrent yeast infections since 10/2024. States she uses Monistat 3 day treatment or sometimes Diflucan. Symptoms resolve with tx and then returns after intercourse with . Symptoms are thick vaginal discharge, itching and dryness, both internal and external. Currently having symptoms for the last 5 days. Advised patient she should be seen and accepts appointment tomorrow morning in [place]. Advised to avoid intercourse until seen tomorrow and not to use any tx or lotions.

## 2025-05-20 NOTE — TELEPHONE ENCOUNTER
Patient states she has been having reoccurring yeast infections, no openings soon. Please advise

## 2025-05-21 ENCOUNTER — OFFICE VISIT (OUTPATIENT)
Dept: OBGYN CLINIC | Facility: CLINIC | Age: 35
End: 2025-05-21

## 2025-05-21 VITALS
SYSTOLIC BLOOD PRESSURE: 118 MMHG | WEIGHT: 167.38 LBS | BODY MASS INDEX: 30 KG/M2 | HEART RATE: 69 BPM | DIASTOLIC BLOOD PRESSURE: 75 MMHG

## 2025-05-21 DIAGNOSIS — N89.8 VAGINAL DISCHARGE: Primary | ICD-10-CM

## 2025-05-21 PROCEDURE — 99213 OFFICE O/P EST LOW 20 MIN: CPT | Performed by: NURSE PRACTITIONER

## 2025-05-21 NOTE — PROGRESS NOTES
The following individual(s) verbally consented to be recorded using ambient AI listening technology and understand that they can each withdraw their consent to this listening technology at any point by asking the clinician to turn off or pause the recording:    Patient name: Celina Allenz

## 2025-05-21 NOTE — PROGRESS NOTES
[facility]   Obstetrics and Gynecology    Celina Ariza is a 34 year old female  Patient's last menstrual period was 2025 (exact date).   Chief Complaint   Patient presents with    Gyn Problem     VAGINAL DRYNESS, ITCHINESS, AND DISCHARGE   . Last seen 2025. Treated for bacterial vaginosis in 2024, and for bacterial vaginosis- in 3/2025.  History of Present Illness  Patient has had frequent yeast and bacterial vaginosis infections since December.    She experiences symptoms of dryness, itchiness, increased discharge that is greenish-yellow, and a yogurt-like odor. These symptoms tend to recur after intercourse with her asymptomatic . Her current symptoms have persisted for about one to two weeks without treatment. She typically uses fluconazole and Monistat for treatment.     In March, she was treated for bacterial vaginosis and has a pattern of alternating between bacterial vaginosis and yeast infections. She uses regular unscented bar soap and has not changed soaps or used vaginal washes. She wears spandex underwear and has not used boric acid or probiotics.    Her menstrual periods are regular every 35 days. She has no urinary symptoms or pelvic pain. Her mother has prediabetes, and her blood sugar was 84 in August    Pap:2023 NILM, negative HPV  Contraception:vasectomy  Mammo: n/a    OBSTETRICS HISTORY:  OB History    Para Term  AB Living   5 4 4 0 1 4   SAB IAB Ectopic Multiple Live Births   0 0 0 0 4       GYNE HISTORY:  Menarche: 11 YEARS OLD (2025  7:54 AM)  Period Cycle (Days): Monthly (2025  7:54 AM)  Period Duration (Days): 7 days (2025  7:54 AM)  Period Flow: Moderate (2025  7:54 AM)  Use of Birth Control (if yes, specify type): None (2025  7:54 AM)  Pap Date: 23 (2025  7:54 AM)  Pap Result Notes: 23 NEG HPV NEG, MAMMO 21 ABIMAEL BENIGN (2025  7:54 AM)      History   Sexual Activity    Sexual activity: Yes      Comment: NONE       MEDICAL HISTORY:  Past Medical History:    Anxiety    Chlamydia    Decorative tattoo       SOCIAL HISTORY:  Social History     Socioeconomic History    Marital status:      Spouse name: Not on file    Number of children: 4    Years of education: Not on file    Highest education level: Not on file   Occupational History    Not on file   Tobacco Use    Smoking status: Never    Smokeless tobacco: Never   Vaping Use    Vaping status: Never Used   Substance and Sexual Activity    Alcohol use: Not Currently    Drug use: Never    Sexual activity: Yes     Comment: NONE   Other Topics Concern     Service Not Asked    Blood Transfusions Not Asked    Caffeine Concern Yes     Comment: Coffee, 1 cup daily    Occupational Exposure Not Asked    Hobby Hazards Not Asked    Sleep Concern Not Asked    Stress Concern Not Asked    Weight Concern Not Asked    Special Diet Not Asked    Back Care Not Asked    Exercise Not Asked    Bike Helmet Not Asked    Seat Belt Not Asked    Self-Exams Not Asked    Grew up on a farm Not Asked    History of tanning Not Asked    Outdoor occupation Not Asked    Breast feeding Not Asked    Reaction to local anesthetic No    Pt has a pacemaker No    Pt has a defibrillator No   Social History Narrative    Not on file     Social Drivers of Health     Food Insecurity: No Food Insecurity (1/11/2025)    NCSS - Food Insecurity     Worried About Running Out of Food in the Last Year: No     Ran Out of Food in the Last Year: No   Transportation Needs: No Transportation Needs (1/11/2025)    NCSS - Transportation     Lack of Transportation: No   Stress: Not on file   Housing Stability: Not At Risk (1/11/2025)    NCSS - Housing/Utilities     Has Housing: Yes     Worried About Losing Housing: No     Unable to Get Utilities: No       MEDICATIONS:    Current Outpatient Medications:     fluconazole 200 MG Oral Tab, Take 1 tablet (200 mg total) by mouth every third day., Disp: 2 tablet,  Rfl: 0    ALLERGIES:  No Known Allergies      Review of Systems:  Constitutional:  Denies fatigue, night sweats, hot flashes  Cardiovascular:  denies chest pain or palpitations  Respiratory:  denies shortness of breath  Gastrointestinal:  denies heartburn, abdominal pain, diarrhea or constipation  Genitourinary: see HPI  Musculoskeletal:  denies back pain.  Skin/Breast:  Denies any breast pain, lumps, or discharge.   Neurological:  denies headaches, extremity weakness or numbness.  Psychiatric: denies depression or anxiety.  Endocrine:   denies excessive thirst or urination.  Heme/Lymph:  denies history of anemia, easy bruising or bleeding.      PHYSICAL EXAM:     Vitals:    05/21/25 0756   BP: 118/75   Pulse: 69   Weight: 167 lb 6.4 oz (75.9 kg)     Body mass index is 29.65 kg/m².     Patient offered chaperone, patient declined    Constitutional: well developed, well nourished    Psychiatric:  Oriented to time, place, person and situation. Appropriate mood and affect    Pelvic Exam:  External Genitalia: normal appearance, hair distribution, and no lesions  Urethral Meatus:  normal in size, location, without lesions and prolapse  Bladder:  No fullness, masses or tenderness  Vagina:  slight erythema without lesions, +white abnormal discharge  Cervix:  Normal without tenderness on motion  Uterus: normal in size, contour, position, mobility, without tenderness  Adnexa: normal without masses or tenderness  Perineum: normal  Anus: no hemorroids   Lymph node: no inguinal lymph nodes      Assessment & Plan:    ICD-10-CM    1. Vaginal discharge  N89.8 Vaginitis Vaginosis PCR Panel     Vaginitis Vaginosis PCR Panel          Assessment & Plan    Cultures done  Reviewed vaginal hygiene  Will follow up with results  Discussed probiotics daily to see if helps reduce infections      Rody Abreu, OMAR        This note was prepared using Dragon Medical voice recognition dictation software. As a result errors may occur. When  identified these errors have been corrected. While every attempt is made to correct errors during dictation discrepancies may still exist.

## 2025-05-22 LAB
BV BACTERIA DNA VAG QL NAA+PROBE: POSITIVE
C GLABRATA DNA VAG QL NAA+PROBE: NEGATIVE
C KRUSEI DNA VAG QL NAA+PROBE: NEGATIVE
CANDIDA DNA VAG QL NAA+PROBE: POSITIVE
T VAGINALIS DNA VAG QL NAA+PROBE: NEGATIVE

## 2025-07-13 ENCOUNTER — OFFICE VISIT (OUTPATIENT)
Dept: FAMILY MEDICINE CLINIC | Facility: CLINIC | Age: 35
End: 2025-07-13

## 2025-07-13 VITALS
HEIGHT: 63 IN | DIASTOLIC BLOOD PRESSURE: 72 MMHG | BODY MASS INDEX: 30.05 KG/M2 | HEART RATE: 74 BPM | SYSTOLIC BLOOD PRESSURE: 107 MMHG | WEIGHT: 169.63 LBS

## 2025-07-13 DIAGNOSIS — R53.83 FATIGUE, UNSPECIFIED TYPE: ICD-10-CM

## 2025-07-13 DIAGNOSIS — F32.A DEPRESSION, UNSPECIFIED DEPRESSION TYPE: ICD-10-CM

## 2025-07-13 DIAGNOSIS — L65.9 HAIR LOSS DISORDER: ICD-10-CM

## 2025-07-13 DIAGNOSIS — F41.9 ANXIETY: ICD-10-CM

## 2025-07-13 DIAGNOSIS — Z00.00 WELL ADULT EXAM: Primary | ICD-10-CM

## 2025-07-13 DIAGNOSIS — E61.1 IRON DEFICIENCY: ICD-10-CM

## 2025-07-13 PROCEDURE — 99395 PREV VISIT EST AGE 18-39: CPT | Performed by: NURSE PRACTITIONER

## 2025-07-13 NOTE — PROGRESS NOTES
The following individual(s) verbally consented to be recorded using ambient AI listening technology and understand that they can each withdraw their consent to this listening technology at any point by asking the clinician to turn off or pause the recording:    Patient name: Celina Ariza     History of Present Illness  Celina Ariza is a 34 year old female who presents for annual physical.  With concerns of hair loss and fatigue.    She is experiencing difficulty losing weight despite significant lifestyle changes, including daily exercise and eliminating sweets, sugary drinks, bread, and tortillas from her diet. These changes have not been effective in helping her lose weight.    She is currently taking iron supplements on Monday, Wednesday, and Saturday but continues to experience hair loss. Her menstrual cycles have extended from 28 days to 37 days, although her periods are not particularly heavy. She also reports fatigue, which she wonders might be related to her iron levels or a possible vitamin deficiency.    Her past medical history includes anxiety and depression, which are well managed with no current concerns. Her last Pap smear was in June 2023, and her vaccinations are up to date.       Review of Systems   Constitutional:  Positive for fatigue.        Inability to lose weight.    Skin:         Hair loss.    All other systems reviewed and are negative.       Vitals:    07/13/25 0846   BP: 107/72   Pulse: 74   Weight: 169 lb 9.6 oz (76.9 kg)   Height: 5' 3\" (1.6 m)     Body mass index is 30.04 kg/m².    Health Maintenance   Topic Date Due    COVID-19 Vaccine (4 - 2024-25 season) 09/01/2024    Influenza Vaccine (1) 10/01/2025    Pap Smear  06/06/2026    Annual Physical  07/13/2026    Colorectal Cancer Screening  02/05/2028    DTaP,Tdap,and Td Vaccines (8 - Td or Tdap) 11/08/2028    Annual Depression Screening  Completed    Pneumococcal Vaccine: Birth to 50yrs  Aged Out    Meningococcal B Vaccine  Aged Out        Past Medical History[1]    .Past Surgical History[2]    Family History[3]    Social History     Socioeconomic History    Marital status:      Spouse name: Not on file    Number of children: 4    Years of education: Not on file    Highest education level: Not on file   Occupational History    Not on file   Tobacco Use    Smoking status: Never    Smokeless tobacco: Never   Vaping Use    Vaping status: Never Used   Substance and Sexual Activity    Alcohol use: Not Currently    Drug use: Never    Sexual activity: Yes     Comment: NONE   Other Topics Concern     Service Not Asked    Blood Transfusions Not Asked    Caffeine Concern Yes     Comment: Coffee, 1 cup daily    Occupational Exposure Not Asked    Hobby Hazards Not Asked    Sleep Concern Not Asked    Stress Concern Not Asked    Weight Concern Not Asked    Special Diet Not Asked    Back Care Not Asked    Exercise Not Asked    Bike Helmet Not Asked    Seat Belt Not Asked    Self-Exams Not Asked    Grew up on a farm Not Asked    History of tanning Not Asked    Outdoor occupation Not Asked    Breast feeding Not Asked    Reaction to local anesthetic No    Pt has a pacemaker No    Pt has a defibrillator No   Social History Narrative    Not on file     Social Drivers of Health     Food Insecurity: No Food Insecurity (1/11/2025)    NCSS - Food Insecurity     Worried About Running Out of Food in the Last Year: No     Ran Out of Food in the Last Year: No   Transportation Needs: No Transportation Needs (1/11/2025)    NCSS - Transportation     Lack of Transportation: No   Stress: Not on file   Housing Stability: Not At Risk (1/11/2025)    NCSS - Housing/Utilities     Has Housing: Yes     Worried About Losing Housing: No     Unable to Get Utilities: No       Current Medications[4]    Allergies:  Allergies[5]    Physical Exam  Vitals and nursing note reviewed.   Constitutional:       General: She is not in acute distress.     Appearance: Normal appearance.  She is not ill-appearing.   HENT:      Head: Normocephalic and atraumatic.      Right Ear: Tympanic membrane, ear canal and external ear normal. There is no impacted cerumen.      Left Ear: Tympanic membrane, ear canal and external ear normal. There is no impacted cerumen.      Nose: Nose normal. No congestion or rhinorrhea.      Mouth/Throat:      Mouth: Mucous membranes are moist.      Pharynx: Oropharynx is clear. No oropharyngeal exudate or posterior oropharyngeal erythema.   Eyes:      General:         Right eye: No discharge.         Left eye: No discharge.      Conjunctiva/sclera: Conjunctivae normal.      Pupils: Pupils are equal, round, and reactive to light.   Cardiovascular:      Rate and Rhythm: Normal rate and regular rhythm.      Pulses: Normal pulses.      Heart sounds: Normal heart sounds. No murmur heard.  Pulmonary:      Effort: Pulmonary effort is normal. No respiratory distress.      Breath sounds: Normal breath sounds. No stridor. No wheezing, rhonchi or rales.   Chest:      Chest wall: No tenderness.   Abdominal:      General: Abdomen is flat. Bowel sounds are normal. There is no distension.      Palpations: There is no mass.      Tenderness: There is no abdominal tenderness. There is no guarding or rebound.      Hernia: No hernia is present.   Musculoskeletal:         General: Normal range of motion.      Cervical back: Normal range of motion. No tenderness.   Skin:     General: Skin is warm and dry.   Neurological:      Mental Status: She is alert and oriented to person, place, and time.   Psychiatric:         Mood and Affect: Mood normal.         Behavior: Behavior normal.          Assessment and Plan:   Problem List Items Addressed This Visit    None  Visit Diagnoses         Well adult exam    -  Primary    Relevant Orders    CBC With Differential With Platelet    Comp Metabolic Panel (14)    Lipid Panel    TSH W Reflex To Free T4    Vitamin D    Vitamin B12    EKG 12 Lead    Ferritin     Iron And Tibc      Hair loss disorder        Relevant Orders    Ferritin    Iron And Tibc      Fatigue, unspecified type        Relevant Orders    Vitamin D    Vitamin B12    Ferritin    Iron And Tibc             Assessment & Plan  Anxiety and depression  Anxiety and depression are well-managed with no current concerns.    Iron deficiency/fatigue  Suspected due to hair loss and fatigue. Menstrual cycle extended to 37 days without heavy bleeding.  - Check iron studies    Well adult exam  -Fasting labs today  -Follow-up on Wednesday to discuss results  -Will refer to bariatric weight loss group pending normal results for weight loss management.       Discussed plan of care with patient and patient is in agreement.  All questions answered. Patient to call with questions or concerns.    Encouraged to sign up for My Chart if not already registered.        [1]   Past Medical History:   Anxiety    Chlamydia    Decorative tattoo    Sexually transmitted disease   [2]   Past Surgical History:  Procedure Laterality Date    Colonoscopy N/A 2025    Procedure: COLONOSCOPY with polypectomy;  Surgeon: KEMAL Sanchez MD;  Location: [facility]    Cosmetic surgery      Abdominoplasty,    Implantable breast prosthesis  2018      ,     Other surgical history  2018    Tummy tuck and fat transfer to buttocks   [3]   Family History  Problem Relation Age of Onset    Diabetes Paternal Grandmother     Dementia Paternal Grandmother     Cancer Paternal Grandfather         per [employer]:  \"??Cancer?? (cause of death)\"    Psychiatric Paternal Grandfather         Esquizofrenia    Cancer Maternal Uncle         Cancer, stomach    Breast Cancer Maternal Grandmother    [4]   No current outpatient medications on file.   [5] No Known Allergies     Scc In Situ Histology Text: There was squamous cell atypia and proliferation in a SCIS pattern.

## 2025-07-14 ENCOUNTER — EKG ENCOUNTER (OUTPATIENT)
Dept: LAB | Age: 35
End: 2025-07-14
Attending: NURSE PRACTITIONER
Payer: MEDICAID

## 2025-07-14 ENCOUNTER — LAB ENCOUNTER (OUTPATIENT)
Dept: LAB | Age: 35
End: 2025-07-14
Attending: NURSE PRACTITIONER
Payer: MEDICAID

## 2025-07-14 DIAGNOSIS — R53.83 FATIGUE, UNSPECIFIED TYPE: ICD-10-CM

## 2025-07-14 DIAGNOSIS — Z00.00 WELL ADULT EXAM: ICD-10-CM

## 2025-07-14 DIAGNOSIS — E61.1 IRON DEFICIENCY: ICD-10-CM

## 2025-07-14 DIAGNOSIS — L65.9 HAIR LOSS DISORDER: ICD-10-CM

## 2025-07-14 LAB
ALBUMIN SERPL-MCNC: 4.7 G/DL (ref 3.2–4.8)
ALBUMIN/GLOB SERPL: 1.6 {RATIO} (ref 1–2)
ALP LIVER SERPL-CCNC: 79 U/L (ref 37–98)
ALT SERPL-CCNC: 20 U/L (ref 10–49)
ANION GAP SERPL CALC-SCNC: 8 MMOL/L (ref 0–18)
AST SERPL-CCNC: 23 U/L (ref ?–34)
ATRIAL RATE: 60 BPM
BASOPHILS # BLD AUTO: 0.03 X10(3) UL (ref 0–0.2)
BASOPHILS NFR BLD AUTO: 0.5 %
BILIRUB SERPL-MCNC: 0.4 MG/DL (ref 0.3–1.2)
BUN BLD-MCNC: 10 MG/DL (ref 9–23)
BUN/CREAT SERPL: 12.2 (ref 10–20)
CALCIUM BLD-MCNC: 9.6 MG/DL (ref 8.7–10.4)
CHLORIDE SERPL-SCNC: 105 MMOL/L (ref 98–112)
CHOLEST SERPL-MCNC: 162 MG/DL (ref ?–200)
CO2 SERPL-SCNC: 27 MMOL/L (ref 21–32)
CREAT BLD-MCNC: 0.82 MG/DL (ref 0.55–1.02)
DEPRECATED HBV CORE AB SER IA-ACNC: 5 NG/ML (ref 50–306)
DEPRECATED RDW RBC AUTO: 40.8 FL (ref 35.1–46.3)
EGFRCR SERPLBLD CKD-EPI 2021: 96 ML/MIN/1.73M2 (ref 60–?)
EOSINOPHIL # BLD AUTO: 0.18 X10(3) UL (ref 0–0.7)
EOSINOPHIL NFR BLD AUTO: 3.1 %
ERYTHROCYTE [DISTWIDTH] IN BLOOD BY AUTOMATED COUNT: 13.4 % (ref 11–15)
FASTING PATIENT LIPID ANSWER: YES
FASTING STATUS PATIENT QL REPORTED: YES
GLOBULIN PLAS-MCNC: 3 G/DL (ref 2–3.5)
GLUCOSE BLD-MCNC: 93 MG/DL (ref 70–99)
HCT VFR BLD AUTO: 41.2 % (ref 35–48)
HDLC SERPL-MCNC: 57 MG/DL (ref 40–59)
HGB BLD-MCNC: 13.4 G/DL (ref 12–16)
IMM GRANULOCYTES # BLD AUTO: 0.01 X10(3) UL (ref 0–1)
IMM GRANULOCYTES NFR BLD: 0.2 %
IRON SATN MFR SERPL: 9 % (ref 15–50)
IRON SERPL-MCNC: 39 UG/DL (ref 50–170)
LDLC SERPL CALC-MCNC: 85 MG/DL (ref ?–100)
LYMPHOCYTES # BLD AUTO: 2.12 X10(3) UL (ref 1–4)
LYMPHOCYTES NFR BLD AUTO: 36.8 %
MCH RBC QN AUTO: 27.1 PG (ref 26–34)
MCHC RBC AUTO-ENTMCNC: 32.5 G/DL (ref 31–37)
MCV RBC AUTO: 83.4 FL (ref 80–100)
MONOCYTES # BLD AUTO: 0.44 X10(3) UL (ref 0.1–1)
MONOCYTES NFR BLD AUTO: 7.6 %
NEUTROPHILS # BLD AUTO: 2.98 X10 (3) UL (ref 1.5–7.7)
NEUTROPHILS # BLD AUTO: 2.98 X10(3) UL (ref 1.5–7.7)
NEUTROPHILS NFR BLD AUTO: 51.8 %
NONHDLC SERPL-MCNC: 105 MG/DL (ref ?–130)
OSMOLALITY SERPL CALC.SUM OF ELEC: 289 MOSM/KG (ref 275–295)
P AXIS: 73 DEGREES
P-R INTERVAL: 160 MS
PLATELET # BLD AUTO: 235 10(3)UL (ref 150–450)
POTASSIUM SERPL-SCNC: 4.3 MMOL/L (ref 3.5–5.1)
PROT SERPL-MCNC: 7.7 G/DL (ref 5.7–8.2)
Q-T INTERVAL: 412 MS
QRS DURATION: 96 MS
QTC CALCULATION (BEZET): 412 MS
R AXIS: 93 DEGREES
RBC # BLD AUTO: 4.94 X10(6)UL (ref 3.8–5.3)
SODIUM SERPL-SCNC: 140 MMOL/L (ref 136–145)
T AXIS: 86 DEGREES
TOTAL IRON BINDING CAPACITY: 429 UG/DL (ref 250–425)
TRANSFERRIN SERPL-MCNC: 340 MG/DL (ref 250–380)
TRIGL SERPL-MCNC: 109 MG/DL (ref 30–149)
TSI SER-ACNC: 2.38 UIU/ML (ref 0.55–4.78)
VENTRICULAR RATE: 60 BPM
VIT B12 SERPL-MCNC: 596 PG/ML (ref 211–911)
VIT D+METAB SERPL-MCNC: 34.4 NG/ML (ref 30–100)
VLDLC SERPL CALC-MCNC: 17 MG/DL (ref 0–30)
WBC # BLD AUTO: 5.8 X10(3) UL (ref 4–11)

## 2025-07-14 PROCEDURE — 84443 ASSAY THYROID STIM HORMONE: CPT

## 2025-07-14 PROCEDURE — 80053 COMPREHEN METABOLIC PANEL: CPT

## 2025-07-14 PROCEDURE — 36415 COLL VENOUS BLD VENIPUNCTURE: CPT

## 2025-07-14 PROCEDURE — 93010 ELECTROCARDIOGRAM REPORT: CPT | Performed by: STUDENT IN AN ORGANIZED HEALTH CARE EDUCATION/TRAINING PROGRAM

## 2025-07-14 PROCEDURE — 80061 LIPID PANEL: CPT

## 2025-07-14 PROCEDURE — 83540 ASSAY OF IRON: CPT

## 2025-07-14 PROCEDURE — 93005 ELECTROCARDIOGRAM TRACING: CPT

## 2025-07-14 PROCEDURE — 84466 ASSAY OF TRANSFERRIN: CPT

## 2025-07-14 PROCEDURE — 82607 VITAMIN B-12: CPT

## 2025-07-14 PROCEDURE — 82306 VITAMIN D 25 HYDROXY: CPT

## 2025-07-14 PROCEDURE — 82728 ASSAY OF FERRITIN: CPT

## 2025-07-14 PROCEDURE — 85025 COMPLETE CBC W/AUTO DIFF WBC: CPT

## 2025-07-16 ENCOUNTER — TELEMEDICINE (OUTPATIENT)
Dept: FAMILY MEDICINE CLINIC | Facility: CLINIC | Age: 35
End: 2025-07-16

## 2025-07-16 DIAGNOSIS — E61.1 IRON DEFICIENCY: Primary | ICD-10-CM

## 2025-07-16 PROCEDURE — 99212 OFFICE O/P EST SF 10 MIN: CPT | Performed by: NURSE PRACTITIONER

## 2025-07-16 RX ORDER — FERROUS SULFATE 325(65) MG
325 TABLET ORAL
Qty: 90 TABLET | Refills: 1 | Status: SHIPPED | OUTPATIENT
Start: 2025-07-16

## 2025-07-16 NOTE — PROGRESS NOTES
History of Present Illness     Virtual Telephone/Video Check-In    Celina Ariza verbally consents to a Virtual/Telephone Check-In visit on 07/16/25.  Patient has been referred to the [facility] website at [email or web address] to review the yearly Consent to Treat document.    Patient understands and accepts financial responsibility for any deductible, co-insurance and/or co-pays associated with this service.    Duration of the service: 10 minutes      Summary of topics discussed:     Patient presents for concerns of inability to lose weight.  Eats healthy and exercises regularly.  Recent labs discussed with patient all normal with the exception of iron deficiency.  Patient reports that she takes iron sparingly.  She reports that she often forgets to take it.    Review of Systems   All other systems reviewed and are negative.       There were no vitals filed for this visit.  There is no height or weight on file to calculate BMI.    Health Maintenance   Topic Date Due    COVID-19 Vaccine (4 - 2024-25 season) 09/01/2024    Influenza Vaccine (1) 10/01/2025    Pap Smear  06/06/2026    Annual Physical  07/13/2026    Colorectal Cancer Screening  02/05/2028    DTaP,Tdap,and Td Vaccines (8 - Td or Tdap) 11/08/2028    Annual Depression Screening  Completed    Pneumococcal Vaccine: Birth to 50yrs  Aged Out    Meningococcal B Vaccine  Aged Out       Past Medical History[1]    .Past Surgical History[2]    Family History[3]    Social History     Socioeconomic History    Marital status:      Spouse name: Not on file    Number of children: 4    Years of education: Not on file    Highest education level: Not on file   Occupational History    Not on file   Tobacco Use    Smoking status: Never    Smokeless tobacco: Never   Vaping Use    Vaping status: Never Used   Substance and Sexual Activity    Alcohol use: Not Currently    Drug use: Never    Sexual activity: Yes     Comment: NONE   Other Topics Concern     Service  Not Asked    Blood Transfusions Not Asked    Caffeine Concern Yes     Comment: Coffee, 1 cup daily    Occupational Exposure Not Asked    Hobby Hazards Not Asked    Sleep Concern Not Asked    Stress Concern Not Asked    Weight Concern Not Asked    Special Diet Not Asked    Back Care Not Asked    Exercise Not Asked    Bike Helmet Not Asked    Seat Belt Not Asked    Self-Exams Not Asked    Grew up on a farm Not Asked    History of tanning Not Asked    Outdoor occupation Not Asked    Breast feeding Not Asked    Reaction to local anesthetic No    Pt has a pacemaker No    Pt has a defibrillator No   Social History Narrative    Not on file     Social Drivers of Health     Food Insecurity: No Food Insecurity (1/11/2025)    NCSS - Food Insecurity     Worried About Running Out of Food in the Last Year: No     Ran Out of Food in the Last Year: No   Transportation Needs: No Transportation Needs (1/11/2025)    NCSS - Transportation     Lack of Transportation: No   Stress: Not on file   Housing Stability: Not At Risk (1/11/2025)    NCSS - Housing/Utilities     Has Housing: Yes     Worried About Losing Housing: No     Unable to Get Utilities: No       Current Medications[4]    Allergies:  Allergies[5]    Physical Exam  Constitutional:       Appearance: Normal appearance.   Pulmonary:      Effort: No respiratory distress.   Neurological:      Mental Status: She is alert and oriented to person, place, and time.          Assessment and Plan:   Problem List Items Addressed This Visit    None  Visit Diagnoses         Iron deficiency    -  Primary      BMI 30.0-30.9,adult        Relevant Orders    Located within [facility] - Eden Jacob MD [address] [place] [address]             Assessment & Plan    Referral to weight loss management group for assessment and treatment options.  Continue with healthy diet and regular exercise efforts.  Restart iron once daily.  Repeat iron studies in 3 months.    Discussed plan of care with  patient and patient is in agreement.  All questions answered. Patient to call with questions or concerns.    Encouraged to sign up for My Chart if not already registered.        [1]   Past Medical History:   Anxiety    Chlamydia    Decorative tattoo    Sexually transmitted disease   [2]   Past Surgical History:  Procedure Laterality Date    Colonoscopy N/A 2025    Procedure: COLONOSCOPY with polypectomy;  Surgeon: KEMAL Sanchez MD;  Location: [facility]    Cosmetic surgery      Abdominoplasty,    Implantable breast prosthesis  2018      ,     Other surgical history  2018    Tummy tuck and fat transfer to buttocks   [3]   Family History  Problem Relation Age of Onset    Diabetes Paternal Grandmother     Dementia Paternal Grandmother     Cancer Paternal Grandfather         per [employer]:  \"??Cancer?? (cause of death)\"    Psychiatric Paternal Grandfather         Esquizofrenia    Cancer Maternal Uncle         Cancer, stomach    Breast Cancer Maternal Grandmother    [4]   No current outpatient medications on file.   [5] No Known Allergies

## 2025-08-22 ENCOUNTER — HOSPITAL ENCOUNTER (OUTPATIENT)
Age: 35
Discharge: HOME | End: 2025-08-22

## 2025-08-22 VITALS
OXYGEN SATURATION: 98 % | TEMPERATURE: 98 F | DIASTOLIC BLOOD PRESSURE: 66 MMHG | RESPIRATION RATE: 18 BRPM | SYSTOLIC BLOOD PRESSURE: 108 MMHG | HEART RATE: 69 BPM

## 2025-08-22 DIAGNOSIS — N76.0 BACTERIAL VAGINOSIS: Primary | ICD-10-CM

## 2025-08-22 DIAGNOSIS — B96.89 BACTERIAL VAGINOSIS: Primary | ICD-10-CM

## 2025-08-22 DIAGNOSIS — R30.0 DYSURIA: ICD-10-CM

## 2025-08-22 LAB
B-HCG UR QL: NEGATIVE
BILIRUB UR QL STRIP: NEGATIVE
CLARITY UR: CLEAR
COLOR UR: YELLOW
GLUCOSE UR STRIP-MCNC: NEGATIVE MG/DL
KETONES UR STRIP-MCNC: NEGATIVE MG/DL
NITRITE UR QL STRIP: NEGATIVE
PH UR STRIP: 6
PROT UR STRIP-MCNC: NEGATIVE MG/DL
SP GR UR STRIP: 1.01
UROBILINOGEN UR STRIP-ACNC: <2 MG/DL

## 2025-08-22 PROCEDURE — 81025 URINE PREGNANCY TEST: CPT | Performed by: NURSE PRACTITIONER

## 2025-08-22 PROCEDURE — 81002 URINALYSIS NONAUTO W/O SCOPE: CPT | Performed by: NURSE PRACTITIONER

## 2025-08-22 PROCEDURE — 99213 OFFICE O/P EST LOW 20 MIN: CPT | Performed by: NURSE PRACTITIONER

## 2025-08-22 RX ORDER — FLUCONAZOLE 200 MG/1
200 TABLET ORAL
Qty: 2 TABLET | Refills: 0 | Status: SHIPPED | OUTPATIENT
Start: 2025-08-22 | End: 2025-08-23 | Stop reason: ALTCHOICE

## 2025-08-23 RX ORDER — METRONIDAZOLE 500 MG/1
500 TABLET ORAL 2 TIMES DAILY
Qty: 14 TABLET | Refills: 0 | Status: SHIPPED | OUTPATIENT
Start: 2025-08-23 | End: 2025-08-30

## 2025-08-25 LAB
C TRACH DNA SPEC QL NAA+PROBE: NEGATIVE
N GONORRHOEA DNA SPEC QL NAA+PROBE: NEGATIVE

## (undated) DEVICE — MEDI-VAC NON-CONDUCTIVE SUCTION TUBING: Brand: CARDINAL HEALTH

## (undated) DEVICE — MEDI-VAC NON-CONDUCTIVE SUCTION TUBING 6MM X 1.8M (6FT.) L: Brand: CARDINAL HEALTH

## (undated) DEVICE — CONMED SCOPE SAVER BITE BLOCK, 20X27 MM: Brand: SCOPE SAVER

## (undated) DEVICE — YANKAUER,BULB TIP,W/O VENT,RIGID,STERILE: Brand: MEDLINE

## (undated) DEVICE — KIT CLEAN ENDOKIT 1.1OZ GOWNX2

## (undated) DEVICE — Device

## (undated) DEVICE — V2 SPECIMEN COLLECTION MANIFOLD KIT: Brand: NEPTUNE

## (undated) DEVICE — LASSO POLYPECTOMY SNARE: Brand: LASSO

## (undated) DEVICE — KIT ENDO ORCAPOD 160/180/190

## (undated) DEVICE — V2 SPECIMEN COLLECTION TRAY: Brand: NEPTUNE

## (undated) DEVICE — 60 ML SYRINGE REGULAR TIP: Brand: MONOJECT

## (undated) NOTE — LETTER
1/27/2022            To Whom It May Concern:    Montrell Madsen was seen in my office today. She may continue to work up to & even past her due date if she wishes.     Sincerely,    Nu Mercedes MD  [address] [place] [address]

## (undated) NOTE — LETTER
7/21/2021              Yamilka Carreno        [address] [facility] [address]         To whom it may concern,    Yamilka Carreno is currently a patient under my medical care.   Patient does have anxiety and depression and recently saw our nurse

## (undated) NOTE — LETTER
4/25/2020          To Whom It May Concern:    Heber Lynn is currently under my medical care for her pregnancy. She has an expected due date of 5/18/20. At this time, she may continue to work through the remainder of her pregnancy.   Should any issues a

## (undated) NOTE — LETTER
5/27/2020              Dorina Garcia            To Whom It May Concern,    Patient Dorina Garcia, can work from home on her computer as of 5/20/20.           Sincerely,        Andrey Graf MD   [address] [place] [address]  [phone]

## (undated) NOTE — LETTER
Javon Lim, DO  [address]  [phone]        Chapo Schwartz        [address] [facility] [address]      [facility]      Chapo Schwartz   [id number]   Female   8/15/1990 (32year old)       Allergies  Review status set to Review Complete on 2/8/2022  No Known Allergies    Current Immunizations  Reviewed on 2/8/2022  Name Date   [address] 3/10/2021 , 2/17/2021   DTP 9/21/1994 , 8/17/1992 , 4/18/1991 , 2/18/1991 , 12/17/1990   HEP B 12/19/2000 , 9/19/2000 , 8/16/2000   INFLUENZA 11/30/2021 , 11/30/2021 , 12/16/2019 , 12/16/2019   IPV 9/21/1994 , 2/26/1994 , 4/18/1991 , 2/18/1991 , 12/17/1990   MMR 7/9/2015 , 8/16/2003   Measles 10/11/1996 , 8/23/1991   RHO(D) Immune Globulin 1/15/2022 , 9/11/2021 , 5/18/2020 , 2/26/2020 , 10/19/2019   Rubella 8/4/1995   TDAP 11/8/2018 , 7/9/2015   Varicella Deferred (Had Chicken Pox) 8/15/1990

## (undated) NOTE — LETTER
If You Are Rh Negative – Routine Administration  If you’re Rh negative, ask your health care provider about getting treated with RhoGam or Rhophylac. Even if you miscarry or don’t deliver the baby, you will still need treatment.  The health of any baby you o RhoGam or Rhophylac injection in your provider’s office as directed      Location, date and time:  [facility]  2/26/2020  @  4:40PM

## (undated) NOTE — LETTER
2022              Shashi Logan        [address] [facility] [address]         To whom it may concern,     Shashi Desmond, : 8/15/1990 was under my care for pregnancy and post partum care. She was seen in the office today and is clear to return back to work as of 2022 with no restrictions. Sincerely,    Vanita Hall.  MD Reyna  [address] [place] [address]  [phone]

## (undated) NOTE — LETTER
AGREEMENT FOR TREATMENT WITH Rh IMMUNE GLOBULIN          To:    Nurse (provider) and [address] [place] [address]     Date: January 15, 2022   Time: 9:18 AM    I authorize the administration of Rh Immune Globulin for    Marcelina Chung (mys

## (undated) NOTE — LETTER
If You Are Rh Negative – Non Routine Administration  If you’re Rh negative, ask your health care provider about getting treated with RhoGam or Rhophylac. Even if you miscarry or don’t deliver the baby, you will still need treatment.  The health of any baby o Blood test to check for blood type and Rh factor at an [address] [facility] ([facility] or Diagnostic [facility])  o RhoGam or Rhophylac injection same day as directed by your provider    Location, date and time:  [facility]  10-19-19

## (undated) NOTE — MR AVS SNAPSHOT
After Visit Summary   10/15/2020    Carrol Bamberger    MRN: [id number]           Visit Information     Date & Time  10/15/2020  4:00 PM Provider  [facility], [address] [facility],[address], [facility]/[employer].  Phone non-emergency, consider your options before heading to an ER. VIDEO VISITS  Visit face-to-face with a [facility] physician or   [facility] using your mobile device or computer   using [employer].    e-VISITS  Communicate with a [facility] Physician or [facility] online.  The physician felix

## (undated) NOTE — LETTER
AGREEMENT FOR TREATMENT WITH Rh IMMUNE GLOBULIN          To:   Dr. Phoenix Munoz (provider) and [facility]   Date: [birth year]_______________________________ Time: 9:15am    I authorize the administration of Rh Immune Globulin

## (undated) NOTE — LETTER
11/3/2020          To Whom It May Concern:    Bebeto Muro is currently under my medical care and may not return to work at this time.     Tyshawn Rome is currently in the process of being tested for COVID 19 to rule out the virus and has been directed to remain

## (undated) NOTE — LETTER
[facility]  Administration of Anesthesia  1. I, Aly Gutierrez, or _________________________________ acting on her behalf, (Patient) (Dependent/Representative) request to receive anesthesia for my pending procedure/operation/treatment. A physician (anesthesiologist) alone or an anesthesiologist working with a nurse anesthetist may administer my anesthesia. 2. I understand that my anesthesiologist is not an employee or agent of the hospital, but is an independent medical practitioner who has been permitted to use its facilities for the care and treatment of his/her patients. 3. I acknowledge that a physician from [facility] or their designate(s), recommended anesthesia for me using her/his medical judgment. The type(s) of anesthesia I may receive include:                a) General Anesthesia, b) Spinal/Epidural Anesthesia, c) Regional Anesthesia or d) Monitored Anesthesia Care. 4. If my spinal, regional or monitored anesthesia care (local) is not satisfactory for my comfort, or if my medical condition requires, I consent to the administration of general anesthesia. 5. I am aware that the practice of anesthesiology is not an exact science and that some foreseeable risks or consequences may occur. Some common risks/consequences include sore throat and hoarseness, nausea and vomiting, muscle soreness, backache, damage to the mouth/teeth/vocal cords and eye injury. I understand that more rare but serious potential risks of anesthesia include blood pressure changes, drug reactions, cardiac arrest, brain damage, paralysis or death. These risks apply to whether I have general, spinal/epidural, regional or monitored anesthesia care. 6. OBSTETRIC PATIENTS: Specific risks/consequences of spinal/epidural anesthesia may include itching, low blood pressure, difficulty urinating, slowing of the baby's heart rate and headache.  Rare risks include infections, high spinal block, spinal bleeding, seizure, cardiac arrest and death. 7. AWARENESS: I understand that it is possible (but unlikely) to have explicit memory of events from the operating room while under general anesthesia. 8. ELECTROCONVULSIVE THERAPY PATIENTS: This consent serves for all treatments in a single course of therapy. 9. I understand that I must inform my anesthesiologist when I last ate and/or drank to minimize the risk of anesthesia. 10. If I am pregnant, or may pregnant, I understand that elective surgery should be postponed until after the baby is born. Anesthetics cross the placenta and may temporarily anesthetize the baby. Although fetal complications of anesthesia during pregnancy are rare, they may include birth defects, premature labor, brain damage and death. 11. I certify that I informed the anesthesiologist, to the best of my ability, about medication I take including blood thinners, anticoagulants, herbal remedies, narcotics and recreational drugs (e.g. cocaine, marijuana, PCP). Failure to inform my anesthesiologist about these medicines may increase my risk of anesthetic complications. The nature and purpose of my anesthetic management was explained to me. I had the opportunity to ask questions and the answers and information provided meet my satisfaction.   I retain the right to withdraw this consent at any time prior to the administration of said anesthetic.    ___________________________________________________           _____________________________________________________  Patient Signature                                                                                      Witness Signature                ___________________________________________________           _____________________________________________________  Date/Time                                                                                               Responsible person in case of minor/ unconscious pt /Relationship    My signature below affirms that prior to the time of the procedure, I have explained to the patient and/or his/her guardian, the risks and benefits of undergoing anesthesia, as well as any reasonable alternatives.     ___________________________________________________            _____________________________________________________  Physician Signature                            Date/Time  Patient Name: Corby Ivory     : 8/15/1990     Printed: 2022      Medical Record #: [id number]                              Page 1 of 1    ----------ANESTHESIA CONSENT----------

## (undated) NOTE — LETTER
[facility]  [address], [place], [place]    Authorization for Surgical Operation and Procedure                               I hereby authorize KEMAL Sanchez MD, my physician and his/her assistants (if applicable), which may include medical students, residents, and/or fellows, to perform the following surgical operation/ procedure and administer such anesthesia as may be determined necessary by my physician: Operation/Procedure name (s) COLONOSCOPY \ ESOPHAGOGASTRODUODENOSCOPY on Celina Ariza   2.   I recognize that during the surgical operation/procedure, unforeseen conditions may necessitate additional or different procedures than those listed above.  I, therefore, further authorize and request that the above-named surgeon, assistants, or designees perform such procedures as are, in their judgment, necessary and desirable.    3.   My surgeon/physician has discussed prior to my surgery the potential benefits, risks and side effects of this procedure; the likelihood of achieving goals; and potential problems that might occur during recuperation.  They also discussed reasonable alternatives to the procedure, including risks, benefits, and side effects related to the alternatives and risks related to not receiving this procedure.  I have had all my questions answered and I acknowledge that no guarantee has been made as to the result that may be obtained.    4.   Should the need arise during my operation/procedure, which includes change of level of care prior to discharge, I also consent to the administration of blood and/or blood products.  Further, I understand that despite careful testing and screening of blood or blood products by collecting agencies, I may still be subject to ill effects as a result of receiving a blood transfusion and/or blood products.  The following are some, but not all, of the potential risks that can occur: fever and allergic reactions, hemolytic reactions,  transmission of diseases such as Hepatitis, AIDS and Cytomegalovirus (CMV) and fluid overload.  In the event that I wish to have an autologous transfusion of my own blood, or a directed donor transfusion, I will discuss this with my physician.  Check only if Refusing Blood or Blood Products  I understand refusal of blood or blood products as deemed necessary by my physician may have serious consequences to my condition to include possible death. I hereby assume responsibility for my refusal and release the hospital, its personnel, and my physicians from any responsibility for the consequences of my refusal.    o  Refuse   5.   I authorize the use of any specimen, organs, tissues, body parts or foreign objects that may be removed from my body during the operation/procedure for diagnosis, research or teaching purposes and their subsequent disposal by hospital authorities.  I also authorize the release of specimen test results and/or written reports to my treating physician on the hospital medical staff or other referring or consulting physicians involved in my care, at the discretion of the Pathologist or my treating physician.    6.   I consent to the photographing or videotaping of the operations or procedures to be performed, including appropriate portions of my body for medical, scientific, or educational purposes, provided my identity is not revealed by the pictures or by descriptive texts accompanying them.  If the procedure has been photographed/videotaped, the surgeon will obtain the original picture, image, videotape or CD.  The hospital will not be responsible for storage, release or maintenance of the picture, image, tape or CD.    7.   I consent to the presence of a  or observers in the operating room as deemed necessary by my physician or their designees.    8.   I recognize that in the event my procedure results in extended X-Ray/fluoroscopy time, I may develop a skin reaction.    9. If  I have a Do Not Attempt Resuscitation (DNAR) order in place, that status will be suspended while in the operating room, procedural suite, and during the recovery period unless otherwise explicitly stated by me (or a person authorized to consent on my behalf). The surgeon or my attending physician will determine when the applicable recovery period ends for purposes of reinstating the DNAR order.  10. Patients having a sterilization procedure: I understand that if the procedure is successful the results will be permanent and it will therefore be impossible for me to inseminate, conceive, or bear children.  I also understand that the procedure is intended to result in sterility, although the result has not been guaranteed.   11. I acknowledge that my physician has explained sedation/analgesia administration to me including the risk and benefits I consent to the administration of sedation/analgesia as may be necessary or desirable in the judgment of my physician.    I CERTIFY THAT I HAVE READ AND FULLY UNDERSTAND THE ABOVE CONSENT TO OPERATION and/or OTHER PROCEDURE.     ____________________________________  _________________________________        ______________________________  Signature of Patient    Signature of Responsible Person                Printed Name of Responsible Person                                      ____________________________________  _____________________________                ________________________________  Signature of Witness        Date  Time         Relationship to Patient    STATEMENT OF PHYSICIAN My signature below affirms that prior to the time of the procedure; I have explained to the patient and/or his/her legal representative, the risks and benefits involved in the proposed treatment and any reasonable alternative to the proposed treatment. I have also explained the risks and benefits involved in refusal of the proposed treatment and alternatives to the proposed treatment and have  answered the patient's questions. If I have a significant financial interest in a co-management agreement or a significant financial interest in any product or implant, or other significant relationship used in this procedure/surgery, I have disclosed this and had a discussion with my patient.     _____________________________________________________              _____________________________  (Signature of Physician)                                                                                         (Date)                                   (Time)  Patient Name: Celina Ariza      : 8/15/1990      Printed: 2025     Medical Record #: [id number]                                      Page 1 of 1

## (undated) NOTE — LETTER
If You Are Rh Negative – Routine Administration    If you’re Rh negative, ask your health care provider about getting treated with RhoGam or Rhophylac. Even if you miscarry or don’t deliver the baby, you will still need treatment.  The health of any baby yo directed

## (undated) NOTE — LETTER
[facility]  Administration of Anesthesia  ICelina agree to be cared for by a physician anesthesiologist alone and/or with a nurse anesthetist, who is specially trained to monitor me and give me medicine to put me to sleep or keep me comfortable during my procedure    I understand that my anesthesiologist and/or anesthetist is not an employee or agent of [facility] or [employer]. He or she works for [facility]    As the patient asking for anesthesia services, I agree to:  Allow the anesthesiologist (anesthesia doctor) to give me medicine and do additional procedures as necessary. Some examples are: Starting or using an “IV” to give me medicine, fluids or blood during my procedure, and having a breathing tube placed to help me breathe when I’m asleep (intubation). In the event that my heart stops working properly, I understand that my anesthesiologist will make every effort to sustain my life, unless otherwise directed by [facility] Do Not Resuscitate documents.  Tell my anesthesia doctor before my procedure:  If I am pregnant.  The last time that I ate or drank.  iii. All of the medicines I take (including prescriptions, herbal supplements, and pills I can buy without a prescription (including street drugs/illegal medications). Failure to inform my anesthesiologist about these medicines may increase my risk of anesthetic complications.  iv.If I am allergic to anything or have had a reaction to anesthesia before.  I understand how the anesthesia medicine will help me (benefits).  I understand that with any type of anesthesia medicine there are risks:  The most common risks are: nausea, vomiting, sore throat, muscle soreness, damage to my eyes, mouth, or teeth (from breathing tube placement).  Rare risks include: remembering what happened during my procedure, allergic reactions to medications, injury to my airway, heart, lungs, vision, nerves, or  muscles and in extremely rare instances death.  My doctor has explained to me other choices available to me for my care (alternatives).  Pregnant Patients (“epidural”):  I understand that the risks of having an epidural (medicine given into my back to help control pain during labor), include itching, low blood pressure, difficulty urinating, headache or slowing of the baby’s heart. Very rare risks include infection, bleeding, seizure, irregular heart rhythms and nerve injury.  Regional Anesthesia (“spinal”, “epidural”, & “nerve blocks”):  I understand that rare but potential complications include headache, bleeding, infection, seizure, irregular heart rhythms, and nerve injury.    _____________________________________________________________________________  Patient (or Representative) Signature/Relationship to Patient  Date   Time    _____________________________________________________________________________   Name (if used)    Language/Organization   Time    _____________________________________________________________________________  Nurse Anesthetist Signature     Date   Time  _____________________________________________________________________________  Anesthesiologist Signature     Date   Time  I have discussed the procedure and information above with the patient (or patient’s representative) and answered their questions. The patient or their representative has agreed to have anesthesia services.    _____________________________________________________________________________  Witness        Date   Time  I have verified that the signature is that of the patient or patient’s representative, and that it was signed before the procedure  Patient Name: Celina Ariza     : 8/15/1990                 Printed: 2025 at 6:52 AM    Medical Record #: [id number]                                            Page 1 of 1  ----------ANESTHESIA CONSENT----------

## (undated) NOTE — LETTER
AGREEMENT FOR TREATMENT WITH Rh IMMUNE GLOBULIN        To:    Eloise GRIFFIN  [address] [place] [address] (provider) and [address] [place] [address]     Date: February 26, 2020   Time: 4:33 PM  I authorize the administration of Rh Immune Globulin for    Shelley Urias

## (undated) NOTE — LETTER
AUTHORIZATION FOR SURGICAL OPERATION OR OTHER PROCEDURE    1.  [facility] my case to perform the following operation and/or procedure at the [facility]:    COLPOSCOPY      ___________ (please print)      Patient signature:  ___________________________________________________             Relationship to Patient:           []  Parent    Responsible person                          []  Spouse  In case of minor or                    [] Other

## (undated) NOTE — LETTER
If You Are Rh Negative – Routine Administration    If you’re Rh negative, ask your health care provider about getting treated with RhoGam or Rhophylac. Even if you miscarry or don’t deliver the baby, you will still need treatment.  The health of any baby yo o RhoGam or Rhophylac injection in your provider’s office as directed      Location, date and time:  [facility] 10-19-19 at 9:15am